# Patient Record
Sex: MALE | Race: WHITE | NOT HISPANIC OR LATINO | Employment: FULL TIME | URBAN - METROPOLITAN AREA
[De-identification: names, ages, dates, MRNs, and addresses within clinical notes are randomized per-mention and may not be internally consistent; named-entity substitution may affect disease eponyms.]

---

## 2017-01-23 DIAGNOSIS — E78.5 HYPERLIPIDEMIA LDL GOAL <160: Primary | ICD-10-CM

## 2017-01-23 NOTE — TELEPHONE ENCOUNTER
atorvastatin (LIPITOR) 20 MG tablet     Last Written Prescription Date: 7-7-2016  Last Fill Quantity: 90, # refills: 1  Last Office Visit with [facility], [facility] or [facility] prescribing provider: 7-8-2016       CHOL      149   9/30/2016  HDL       53   9/30/2016  LDL       71   9/30/2016  TRIG      123   9/30/2016  CHOLHDLRATIO      5.4   10/12/2009

## 2017-01-24 RX ORDER — ATORVASTATIN CALCIUM 20 MG/1
20 TABLET, FILM COATED ORAL DAILY
Qty: 90 TABLET | Refills: 1 | Status: SHIPPED | OUTPATIENT
Start: 2017-01-24 | End: 2017-05-25

## 2017-01-24 NOTE — TELEPHONE ENCOUNTER
Prescription approved per [facility] Protocol.     Fiona Silverman RN   January 24, 2017 4:30 PM  [facility]   [phone]

## 2017-05-25 ENCOUNTER — MYC REFILL (OUTPATIENT)
Dept: FAMILY MEDICINE | Facility: CLINIC | Age: 62
End: 2017-05-25

## 2017-05-25 DIAGNOSIS — E78.5 HYPERLIPIDEMIA LDL GOAL <160: ICD-10-CM

## 2017-05-26 NOTE — TELEPHONE ENCOUNTER
atorvastatin (LIPITOR) 20 MG tablet     Last Written Prescription Date: 01/24/17  Last Fill Quantity: 90, # refills: 1  Last Office Visit with [facility], [facility] or [facility] prescribing provider: 07/08/2016  Next 5 appointments (look out 90 days)     Jul 11, 2017  4:20 PM CDT   MyChart Physical Adult with Escobar Curry PA-C   [facility]    [address] [place] [id number] [phone]                   Lab Results   Component Value Date    CHOL 149 09/30/2016     Lab Results   Component Value Date    HDL 53 09/30/2016     Lab Results   Component Value Date    LDL 71 09/30/2016     Lab Results   Component Value Date    TRIG 123 09/30/2016     Lab Results   Component Value Date    CHOLHDLRATIO 5.4 10/12/2009

## 2017-05-29 ENCOUNTER — OFFICE VISIT (OUTPATIENT)
Dept: URGENT CARE | Facility: URGENT CARE | Age: 62
End: 2017-05-29
Payer: OTHER MISCELLANEOUS

## 2017-05-29 ENCOUNTER — RADIANT APPOINTMENT (OUTPATIENT)
Dept: GENERAL RADIOLOGY | Facility: CLINIC | Age: 62
End: 2017-05-29
Attending: PHYSICIAN ASSISTANT

## 2017-05-29 VITALS
HEART RATE: 72 BPM | TEMPERATURE: 98.2 F | OXYGEN SATURATION: 96 % | DIASTOLIC BLOOD PRESSURE: 83 MMHG | SYSTOLIC BLOOD PRESSURE: 127 MMHG

## 2017-05-29 DIAGNOSIS — S99.921A INJURY OF RIGHT FOOT, INITIAL ENCOUNTER: ICD-10-CM

## 2017-05-29 DIAGNOSIS — M25.571 ACUTE RIGHT ANKLE PAIN: Primary | ICD-10-CM

## 2017-05-29 DIAGNOSIS — S92.901A FRACTURE OF RIGHT FOOT, CLOSED, INITIAL ENCOUNTER: ICD-10-CM

## 2017-05-29 DIAGNOSIS — M25.571 ACUTE RIGHT ANKLE PAIN: ICD-10-CM

## 2017-05-29 PROCEDURE — 73630 X-RAY EXAM OF FOOT: CPT | Mod: RT

## 2017-05-29 PROCEDURE — 99214 OFFICE O/P EST MOD 30 MIN: CPT | Performed by: PHYSICIAN ASSISTANT

## 2017-05-29 PROCEDURE — 73610 X-RAY EXAM OF ANKLE: CPT | Mod: RT

## 2017-05-29 RX ORDER — HYDROCODONE BITARTRATE AND ACETAMINOPHEN 5; 325 MG/1; MG/1
1-2 TABLET ORAL EVERY 6 HOURS PRN
Qty: 20 TABLET | Refills: 0 | Status: SHIPPED | OUTPATIENT
Start: 2017-05-29 | End: 2017-07-11

## 2017-05-29 NOTE — MR AVS SNAPSHOT
After Visit Summary   5/29/2017    Mina Aems    MRN: [id number]           Patient Information     Date Of Birth          1955        Visit Information        Provider Department      5/29/2017 4:05 PM Cain Dotson PA-C [facility]        Today's Diagnoses     Acute right ankle pain    -  1    Injury of right foot, initial encounter        Fracture of right foot, closed, initial encounter           Follow-ups after your visit        Your next 10 appointments already scheduled     Jul 11, 2017  1:15 PM CDT   Return Visit with Heri Cervantes DPM   [facility] [place] ([facility] [place])    [address]  [place] [address] [phone]            Jul 11, 2017  4:20 PM CDT   [facility]Chart Physical Adult with Escobar [facility]    [address] [address] [phone]              Who to contact     If you have questions or need follow up information about today's clinic visit or your schedule please contact [facility] directly at [phone].  Normal or non-critical lab and imaging results will be communicated to you by Anthillhart, letter or phone within 4 business days after the clinic has received the results. If you do not hear from us within 7 days, please contact the clinic through Anthillhart or phone. If you have a critical or abnormal lab result, we will notify you by phone as soon as possible.  Submit refill requests through [employer] or call your pharmacy and they will forward the refill request to us. Please allow 3 business days for your refill to be completed.          Additional Information About Your Visit        MyChart Information     IntroNet gives you secure access to your electronic health record. If you see a primary care provider, you can also send messages to your care  team and make appointments. If you have questions, please call your primary care clinic.  If you do not have a primary care provider, please call [phone] and they will assist you.        Care EveryWhere ID     This is your Care EveryWhere ID. This could be used by other organizations to access your [facility] medical records  [id number]        Your Vitals Were     Pulse Temperature Pulse Oximetry             72 98.2  F (36.8  C) (Oral) 96%          Blood Pressure from Last 3 Encounters:   05/29/17 127/83   07/08/16 130/84   07/04/16 (!) 138/94    Weight from Last 3 Encounters:   05/30/17 234 lb (106.1 kg)   07/08/16 227 lb (103 kg)   07/04/16 226 lb 5 oz (102.7 kg)                 Today's Medication Changes          These changes are accurate as of: 5/29/17 11:59 PM.  If you have any questions, ask your nurse or doctor.               Start taking these medicines.        Dose/Directions    HYDROcodone-acetaminophen 5-325 MG per tablet   Commonly known as:  NORCO   Used for:  Fracture of right foot, closed, initial encounter   Started by:  Cain Dotson PA-C        Dose:  1-2 tablet   Take 1-2 tablets by mouth every 6 hours as needed for moderate to severe pain No working or driving on medication   Quantity:  20 tablet   Refills:  0       * order for DME   Used for:  Fracture of right foot, closed, initial encounter   Started by:  Cain Dotson PA-C        Right short cam walker   Quantity:  1 Device   Refills:  0       * order for DME   Used for:  Fracture of right foot, closed, initial encounter   Started by:  Cain Dotson PA-C        Crutches 1 pair   Quantity:  1 Device   Refills:  0       * Notice:  This list has 2 medication(s) that are the same as other medications prescribed for you. Read the directions carefully, and ask your doctor or other care provider to review them with you.         Where to get your medicines      Some of these will need a paper prescription and others can be bought over  the counter.  Ask your nurse if you have questions.     Bring a paper prescription for each of these medications     HYDROcodone-acetaminophen 5-325 MG per tablet    order for DME    order for DME                Primary Care Provider Office Phone # Fax #    Tomas De La Cruz [phone]       [facility] VALENZUELA [address]  [place] [address]        Thank you!     Thank you for choosing [facility]  for your care. Our goal is always to provide you with excellent care. Hearing back from our patients is one way we can continue to improve our services. Please take a few minutes to complete the written survey that you may receive in the mail after your visit with us. Thank you!             Your Updated Medication List - Protect others around you: Learn how to safely use, store and throw away your medicines at www.disposemymeds.org.          This list is accurate as of: 5/29/17 11:59 PM.  Always use your most recent med list.                   Brand Name Dispense Instructions for use    aspirin 81 MG tablet      Take 81 mg by mouth daily       atorvastatin 20 MG tablet    LIPITOR    90 tablet    Take 1 tablet (20 mg) by mouth daily       HYDROcodone-acetaminophen 5-325 MG per tablet    NORCO    20 tablet    Take 1-2 tablets by mouth every 6 hours as needed for moderate to severe pain No working or driving on medication       MULTI vitamin  MENS Tabs          * order for DME     1 Device    Right short cam walker       * order for DME     1 Device    Crutches 1 pair       * Notice:  This list has 2 medication(s) that are the same as other medications prescribed for you. Read the directions carefully, and ask your doctor or other care provider to review them with you.

## 2017-05-29 NOTE — LETTER
[facility]  [address] [address]  [phone]        May 29, 2017    REPORT OF WORK ABILITY    PATIENT DATA  Employee Name: Mina Ames        : 1955   [id number]  Work related injury: YES  Today's date: May 29, 2017  Date of injury: 2017     PROVIDER EVALUATION: Please fill in as needed.  Please give copy to employee for employer.  1. Diagnosis: Right foot fracture, right ankle sprain  2. Treatment: cam walker, crutches, medication  3. Medication: vicodin  NOTE: When ordering a medication, [place] Rules require Work Comp or WC on prescriptions.  4. Return to work date: TBD    RESTRICTIONS: No work as patient will need to use crutches, he cannot drive nor lift or carry luggage      Medical Examiner: Cain Dotson [facility] PAC      Next appointment: this week with Podiatry/Orthopedics    CC: Employer, Managed Care Plan/Payor, Patient

## 2017-05-29 NOTE — NURSING NOTE
"Chief Complaint   Patient presents with     Ankle/Foot right     Rt ankle injury, pain  x today.       Initial /83 (BP Location: Left arm, Patient Position: Chair, Cuff Size: Adult Regular)  Pulse 72  Temp 98.2  F (36.8  C) (Oral)  SpO2 96% Estimated body mass index is 33.28 kg/(m^2) as calculated from the following:    Height as of 7/8/16: 5' 9.25\" (1.759 m).    Weight as of 7/8/16: 227 lb (103 kg).  Medication Reconciliation: complete    "

## 2017-05-30 ENCOUNTER — OFFICE VISIT (OUTPATIENT)
Dept: PODIATRY | Facility: CLINIC | Age: 62
End: 2017-05-30
Payer: OTHER MISCELLANEOUS

## 2017-05-30 VITALS — WEIGHT: 234 LBS | BODY MASS INDEX: 34.31 KG/M2 | HEART RATE: 100 BPM | OXYGEN SATURATION: 95 %

## 2017-05-30 DIAGNOSIS — S92.354B OPEN NONDISPLACED FRACTURE OF FIFTH METATARSAL BONE OF RIGHT FOOT, INITIAL ENCOUNTER: Primary | ICD-10-CM

## 2017-05-30 DIAGNOSIS — S82.65XA CLOSED NONDISPLACED FRACTURE OF LATERAL MALLEOLUS OF LEFT FIBULA, INITIAL ENCOUNTER: ICD-10-CM

## 2017-05-30 PROCEDURE — 28470 CLTX METATARSAL FX WO MNP EA: CPT | Mod: 59 | Performed by: PODIATRIST

## 2017-05-30 PROCEDURE — 27786 TREATMENT OF ANKLE FRACTURE: CPT | Mod: RT | Performed by: PODIATRIST

## 2017-05-30 NOTE — MR AVS SNAPSHOT
After Visit Summary   5/30/2017    Mina Ames    MRN: [id number]           Patient Information     Date Of Birth          1955        Visit Information        Provider Department      5/30/2017 1:30 PM Heri Cervantes, DPPAT [facility] [place] [facility] Instructions    We wish you continued good healing. If you have any questions or concerns, please do not hesitate to contact us at [phone]      Please remember to call and schedule a follow up appointment if one was recommended at your earliest convenience.   PODIATRY CLINIC HOURS  TELEPHONE NUMBER    Dr. Heri PAULPCARY FAC FAS    Clinics:  [facility]        Ekta Ahuja MA  Medical Assistant  Tuesday 1PM-6PM  [facility]  Wednesday 7AM-2PM  [place]  Thursday 10AM-6PM  [place]  Friday 7AM-345PM  [place]  Specialty schedulers:   [phone] to make an appointment with any Specialty Provider.        Urgent Care locations:    [facility] Monday-Friday 5 pm - 9 pm. Saturday-Sunday 9 am -5pm    Monday-Friday 11 am - 9 pm Saturday 9 am - 5 pm     Monday-Sunday 12 noon-8PM [phone]     If you need a medication refill, please contact us you may need lab work and/or a follow up visit prior to your refill (i.e. Antifungal medications).    No Chainshart (secure e-mail communication and access to your chart) to send a message or to make an appointment.    If MRI needed please call [facility] at [phone]        Weight management plan: Patient was referred to their PCP to discuss a diet and exercise plan.            Follow-ups after your visit        Your next 10 appointments already scheduled     Jul 11, 2017  4:20 PM PAUL Perez Physical Adult with Escobar Curry PA-C   [facility]    [address] [place] [address]  [facility] [address] [phone]              Who to contact     If you have questions or need follow up information about today's clinic visit or your schedule please contact [facility] [place] directly at [phone].  Normal or non-critical lab and imaging results will be communicated to you by MyChart, letter or phone within 4 business days after the clinic has received the results. If you do not hear from us within 7 days, please contact the clinic through MyChart or phone. If you have a critical or abnormal lab result, we will notify you by phone as soon as possible.  Submit refill requests through [employer] or call your pharmacy and they will forward the refill request to us. Please allow 3 business days for your refill to be completed.          Additional Information About Your Visit        MagTaghart Information     [employer] gives you secure access to your electronic health record. If you see a primary care provider, you can also send messages to your care team and make appointments. If you have questions, please call your primary care clinic.  If you do not have a primary care provider, please call [phone] and they will assist you.        Care EveryWhere ID     This is your Care EveryWhere ID. This could be used by other organizations to access your [facility] medical records  [id number]        Your Vitals Were     Pulse Pulse Oximetry BMI (Body Mass Index)             100 95% 34.31 kg/m2          Blood Pressure from Last 3 Encounters:   05/29/17 127/83   07/08/16 130/84   07/04/16 (!) 138/94    Weight from Last 3 Encounters:   05/30/17 106.1 kg (234 lb)   07/08/16 103 kg (227 lb)   07/04/16 102.7 kg (226 lb 5 oz)              Today, you had the following     No orders found for display       Primary Care Provider Office Phone # Fax #    Tomas De La Cruz [phone]       [facility] [place] [address]  [place] [address]        Thank  you!     Thank you for choosing [facility]  for your care. Our goal is always to provide you with excellent care. Hearing back from our patients is one way we can continue to improve our services. Please take a few minutes to complete the written survey that you may receive in the mail after your visit with us. Thank you!             Your Updated Medication List - Protect others around you: Learn how to safely use, store and throw away your medicines at www.disposemymeds.org.          This list is accurate as of: 5/30/17  1:35 PM.  Always use your most recent med list.                   Brand Name Dispense Instructions for use    aspirin 81 MG tablet      Take 81 mg by mouth daily       atorvastatin 20 MG tablet    LIPITOR    90 tablet    Take 1 tablet (20 mg) by mouth daily       HYDROcodone-acetaminophen 5-325 MG per tablet    NORCO    20 tablet    Take 1-2 tablets by mouth every 6 hours as needed for moderate to severe pain No working or driving on medication       MULTI vitamin  MENS Tabs          * order for DME     1 Device    Right short cam walker       * order for DME     1 Device    Crutches 1 pair       * Notice:  This list has 2 medication(s) that are the same as other medications prescribed for you. Read the directions carefully, and ask your doctor or other care provider to review them with you.

## 2017-05-30 NOTE — PROGRESS NOTES
Subjective:    Patient seen as a new patient consult from Cain Dotson and is seen today  for right ankle sprain.  Had inversion sprain 1 days ago at work.   for [employer].  Had pain and edema, seen in clinic, xrays taken, and given air cast and crutches.  Slightly better today.  Has been off foot.  No problems here before sprain.  Never smoked.           ROS:  denies numbness, erythema, shortness of breath       Allergies   Allergen Reactions     Nkda [No Known Drug Allergies]        Current Outpatient Prescriptions   Medication Sig Dispense Refill     order for DME Right short cam walker 1 Device 0     order for DME Crutches 1 pair 1 Device 0     HYDROcodone-acetaminophen (NORCO) 5-325 MG per tablet Take 1-2 tablets by mouth every 6 hours as needed for moderate to severe pain No working or driving on medication 20 tablet 0     atorvastatin (LIPITOR) 20 MG tablet Take 1 tablet (20 mg) by mouth daily 90 tablet 1     aspirin 81 MG tablet Take 81 mg by mouth daily       Multiple Vitamin (MULTI VITAMIN  MENS) TABS          Patient Active Problem List   Diagnosis     Hyperlipidemia     Adult BMI 30+     DJD (degenerative joint disease) of knee     HYPERLIPIDEMIA LDL GOAL <160       Past Medical History:   Diagnosis Date     Adult BMI 30+      DJD (degenerative joint disease) of knee     xray Oct., 2009     Hyperlipidemia      Squamous cell skin cancer 2007    chest       Past Surgical History:   Procedure Laterality Date     C APPENDECTOMY  1971     SURGICAL HISTORY OF -   1995    wrist       Family History   Problem Relation Age of Onset     Hypertension Mother      HEART DISEASE Father      Hypertension Father      Lipids Father      Depression Father      Coronary Artery Disease Father      Bypass surgery - 3        Social History   Substance Use Topics     Smoking status: Never Smoker     Smokeless tobacco: Not on file     Alcohol use No         Objective:    vitals noted.   Patient pleasant to talk with  and in no apparent distress.   CRT < 3 seconds all toes.  No varicosities noted.   +2/4 DP & PT bilateral, sensation to light touch is intact.  Achilles reflex 2/4 bilateral.  Normal arch with weight bearing.  Muscle strength 5/5 in all compartments.  No pain with stressing any tendons.  Normal ROM all forefoot and rearfoot joints.     positive edema and ecchymosis right foot/ankle lateral  negative erythema  negative pain at TMTJs   Pain right fifth styloid process.  negative Achilles or calcaneal tubercle pain  negative medial ankle pain  negative pain AITF ligament  positive pain over ATFL/CFL/lateral ankle  negative pain with stressing or palpation peroneal tendons  negative peroneal subluxation  negative pain over medial   Pain over lateral malleoli    XR FOOT RT G/E 3 VW  5/29/2017 5:17 PM      HISTORY:  Unspecified injury of right foot, initial encounter     COMPARISON: None.         IMPRESSION: There is a fracture through the base of the fifth  metatarsal. No significant displacement.    XR ANKLE RT G/E 3 VW    5/29/2017 5:17 PM       HISTORY: Pain in right ankle and joints of right foot     COMPARISON: None.         IMPRESSION: There is a nondisplaced fracture through the tip of the  lateral malleolus..  Associated soft tissue swelling. Again noted is  the fracture through the base of the fifth metatarsal.      Assessment:  Right styloid process avulsion fracture                          Right lateral malleoli distal fracture      Plan:  X-rays personally reviewed.  Discussed etiology and treatment options with the patient in detail.  Discussed both fracture are stable and need NWB so they may heal uneventfully.  He has air cast and this is comfortable.  Using crutches with no problem.  Discussed knee walker.    Discussed PRICE.   F/U 4 wks for repeat x-ray.  Fracture care x 2.  Thank you for allowing me participate in the care of this patient.        Heri Cervantes DPM, FACFAS

## 2017-05-30 NOTE — PATIENT INSTRUCTIONS
We wish you continued good healing. If you have any questions or concerns, please do not hesitate to contact us at [phone]      Please remember to call and schedule a follow up appointment if one was recommended at your earliest convenience.   PODIATRY CLINIC HOURS  TELEPHONE NUMBER    Dr. Heri Cervantes D.P.M [facility]    Clinics:  [facility]        Ekta Ahuja MA  Medical Assistant  Tuesday 1PM-6PM  [facility]  Wednesday 7AM-2PM  [place]  Thursday 10AM-6PM  [place] Friday 7AM-345PM  [place]  Specialty schedulers:   [phone] to make an appointment with any Specialty Provider.        Urgent Care locations:    [facility] Monday-Friday 5 pm - 9 pm. Saturday-Sunday 9 am -5pm    Monday-Friday 11 am - 9 pm Saturday 9 am - 5 pm     Monday-Sunday 12 noon-8PM [phone]     If you need a medication refill, please contact us you may need lab work and/or a follow up visit prior to your refill (i.e. Antifungal medications).    Web [employer] (secure e-mail communication and access to your chart) to send a message or to make an appointment.    If MRI needed please call [facility] at [phone]        Weight management plan: Patient was referred to their PCP to discuss a diet and exercise plan.

## 2017-05-30 NOTE — LETTER
5/30/2017       RE: Mina Ames  [address]  [place] [address]           Dear Colleague,    Thank you for referring your patient, Mina Ames, to the [facility] [place]. Please see a copy of my visit note below.    Subjective:    Patient seen as a new patient consult from Cain Dotson and is seen today  for right ankle sprain.  Had inversion sprain 1 days ago at work.   for [employer].  Had pain and edema, seen in clinic, xrays taken, and given air cast and crutches.  Slightly better today.  Has been off foot.  No problems here before sprain.  Never smoked.           ROS:  denies numbness, erythema, shortness of breath       Allergies   Allergen Reactions     Nkda [No Known Drug Allergies]        Current Outpatient Prescriptions   Medication Sig Dispense Refill     order for DME Right short cam walker 1 Device 0     order for DME Crutches 1 pair 1 Device 0     HYDROcodone-acetaminophen (NORCO) 5-325 MG per tablet Take 1-2 tablets by mouth every 6 hours as needed for moderate to severe pain No working or driving on medication 20 tablet 0     atorvastatin (LIPITOR) 20 MG tablet Take 1 tablet (20 mg) by mouth daily 90 tablet 1     aspirin 81 MG tablet Take 81 mg by mouth daily       Multiple Vitamin (MULTI VITAMIN  MENS) TABS          Patient Active Problem List   Diagnosis     Hyperlipidemia     Adult BMI 30+     DJD (degenerative joint disease) of knee     HYPERLIPIDEMIA LDL GOAL <160       Past Medical History:   Diagnosis Date     Adult BMI 30+      DJD (degenerative joint disease) of knee     xray Oct., 2009     Hyperlipidemia      Squamous cell skin cancer 2007    chest       Past Surgical History:   Procedure Laterality Date     C APPENDECTOMY  1971     SURGICAL HISTORY OF -   1995    wrist       Family History   Problem Relation Age of Onset     Hypertension Mother      HEART DISEASE Father      Hypertension Father      Lipids Father      Depression Father       Coronary Artery Disease Father      Bypass surgery - 3        Social History   Substance Use Topics     Smoking status: Never Smoker     Smokeless tobacco: Not on file     Alcohol use No         Objective:    vitals noted.   Patient pleasant to talk with and in no apparent distress.   CRT < 3 seconds all toes.  No varicosities noted.   +2/4 DP & PT bilateral, sensation to light touch is intact.  Achilles reflex 2/4 bilateral.  Normal arch with weight bearing.  Muscle strength 5/5 in all compartments.  No pain with stressing any tendons.  Normal ROM all forefoot and rearfoot joints.     positive edema and ecchymosis right foot/ankle lateral  negative erythema  negative pain at TMTJs   Pain right fifth styloid process.  negative Achilles or calcaneal tubercle pain  negative medial ankle pain  negative pain AITF ligament  positive pain over ATFL/CFL/lateral ankle  negative pain with stressing or palpation peroneal tendons  negative peroneal subluxation  negative pain over medial   Pain over lateral malleoli    XR FOOT RT G/E 3 VW  5/29/2017 5:17 PM      HISTORY:  Unspecified injury of right foot, initial encounter     COMPARISON: None.         IMPRESSION: There is a fracture through the base of the fifth  metatarsal. No significant displacement.    XR ANKLE RT G/E 3 VW    5/29/2017 5:17 PM       HISTORY: Pain in right ankle and joints of right foot     COMPARISON: None.         IMPRESSION: There is a nondisplaced fracture through the tip of the  lateral malleolus..  Associated soft tissue swelling. Again noted is  the fracture through the base of the fifth metatarsal.      Assessment:  Right styloid process avulsion fracture                          Right lateral malleoli distal fracture      Plan:  X-rays personally reviewed.  Discussed etiology and treatment options with the patient in detail.  Discussed both fracture are stable and need NWB so they may heal uneventfully.  He has air cast and this is comfortable.   Using crutches with no problem.  Discussed knee walker.    Discussed PRICE.   F/U 4 wks for repeat x-ray.  Fracture care x 2.  Thank you for allowing me participate in the care of this patient.        Heri Cervantes DPM, FACFAS    Again, thank you for allowing me to participate in the care of your patient.        Sincerely,              Heri Cervantes DPM

## 2017-05-30 NOTE — LETTER
[facility] [place]  [address]  [place] [address]  Phone: [phone]  Fax: [phone]    May 30, 2017        Mina Ames  [address] [address]          To whom it may concern:    RE: Mina CLAYTON [place]    Patient was seen and treated today at our clinic.  Patient may return to work  with the following:  SEATED WORK ONLY FOR 8 WEEKS  When the patient returns to work, the following restrictions apply until 7/25/17    Please contact me for questions or concerns.      Sincerely,        Heri Cervantes DPM/AIMEE

## 2017-05-30 NOTE — NURSING NOTE
"Chief Complaint   Patient presents with     Ankle Pain     Right ankle     Work Comp       Initial Pulse 100  Wt 106.1 kg (234 lb)  SpO2 95%  BMI 34.31 kg/m2 Estimated body mass index is 34.31 kg/(m^2) as calculated from the following:    Height as of 7/8/16: 1.759 m (5' 9.25\").    Weight as of this encounter: 106.1 kg (234 lb).  Medication Reconciliation: complete  "

## 2017-06-02 RX ORDER — ATORVASTATIN CALCIUM 20 MG/1
20 TABLET, FILM COATED ORAL DAILY
Qty: 30 TABLET | Refills: 1 | Status: SHIPPED | OUTPATIENT
Start: 2017-06-02 | End: 2017-07-11

## 2017-06-02 NOTE — TELEPHONE ENCOUNTER
Medication is being filled for 1 time refill only due to:  Patient needs to be seen because it has been more than one year since last visit.     Luis Bell RN

## 2017-06-02 NOTE — PROGRESS NOTES
SUBJECTIVE:  Chief Complaint   Patient presents with     Ankle/Foot right     Rt ankle injury, pain  x today.     Mina Ames is a 61 year old male presents with a chief complaint of right ankle and foot injury, tenderness, pain with swelling .  The injury occurred this morning .   The injury happened while at work. How: fall , trauma: twisted.  The patient complained of moderate pain  and has had decreased ROM.  Pain exacerbated by walking and weight-bearing.  Relieved by rest.  He treated it initially with no therapy. This is the first time this type of injury has occurred to this patient.     Past Medical History:   Diagnosis Date     Adult BMI 30+      DJD (degenerative joint disease) of knee     xray Oct., 2009     Hyperlipidemia      Squamous cell skin cancer 2007    chest     Allergies   Allergen Reactions     Nkda [No Known Drug Allergies]      Social History   Substance Use Topics     Smoking status: Never Smoker     Smokeless tobacco: Not on file     Alcohol use No       ROS:  CONSTITUTIONAL:NEGATIVE for fever, chills, change in weight  INTEGUMENTARY/SKIN: POSITIVE for swelling  MUSCULOSKELETAL: POSITIVE  for right ankle and foot injury  VASC: NEGATIVE for cold extremities  EXT: Positive for ankle and foot pain  NEURO: NEGATIVE for weakness, dizziness or paresthesias    EXAM:   /83 (BP Location: Left arm, Patient Position: Chair, Cuff Size: Adult Regular)  Pulse 72  Temp 98.2  F (36.8  C) (Oral)  SpO2 96%  Gen: healthy,alert,no distress  Extremity: foot and ankle has swelling and point tenderness lateral ankle and foot.   There is not compromise to the distal circulation.  Pulses are +2 and CRT is brisk  GENERAL APPEARANCE: healthy, alert and no distress  EXTREMITIES: peripheral pulses normal  MS:  Positive for right ankle tenderness, localized swelling and pain  SKIN: Positive for swelling of lateral ankle and foot  NEURO: Normal strength and tone, sensory exam grossly normal, mentation  "intact and speech normal    X-RAY Positive for foot fracture and possible chip fracture off lateral malleolus Xray read by Cain Dotson at time of visit    ASSESSMENT/PLAN\"      ICD-10-CM    1. Acute right ankle pain M25.571 XR Ankle Right G/E 3 Views   2. Injury of right foot, initial encounter S99.921A XR Foot Right G/E 3 Views   3. Fracture of right foot, closed, initial encounter S92.901A order for DME     order for DME     HYDROcodone-acetaminophen (NORCO) 5-325 MG per tablet     RICE treatment: Rest, Ice, compression, elevation   Wear cam walker  Use crutches  Follow up with orthopedics   "

## 2017-07-11 ENCOUNTER — OFFICE VISIT (OUTPATIENT)
Dept: PODIATRY | Facility: CLINIC | Age: 62
End: 2017-07-11
Payer: OTHER MISCELLANEOUS

## 2017-07-11 ENCOUNTER — RADIANT APPOINTMENT (OUTPATIENT)
Dept: GENERAL RADIOLOGY | Facility: CLINIC | Age: 62
End: 2017-07-11
Attending: PODIATRIST
Payer: COMMERCIAL

## 2017-07-11 ENCOUNTER — OFFICE VISIT (OUTPATIENT)
Dept: FAMILY MEDICINE | Facility: CLINIC | Age: 62
End: 2017-07-11
Payer: COMMERCIAL

## 2017-07-11 VITALS
WEIGHT: 235.13 LBS | TEMPERATURE: 98.3 F | HEART RATE: 80 BPM | SYSTOLIC BLOOD PRESSURE: 126 MMHG | BODY MASS INDEX: 33.66 KG/M2 | DIASTOLIC BLOOD PRESSURE: 78 MMHG | HEIGHT: 70 IN

## 2017-07-11 VITALS — WEIGHT: 235 LBS | HEART RATE: 90 BPM | OXYGEN SATURATION: 98 % | BODY MASS INDEX: 34.45 KG/M2

## 2017-07-11 DIAGNOSIS — S82.65XA CLOSED NONDISPLACED FRACTURE OF LATERAL MALLEOLUS OF LEFT FIBULA, INITIAL ENCOUNTER: Primary | ICD-10-CM

## 2017-07-11 DIAGNOSIS — S82.65XA CLOSED NONDISPLACED FRACTURE OF LATERAL MALLEOLUS OF LEFT FIBULA, INITIAL ENCOUNTER: ICD-10-CM

## 2017-07-11 DIAGNOSIS — M17.11 OSTEOARTHRITIS OF RIGHT KNEE, UNSPECIFIED OSTEOARTHRITIS TYPE: ICD-10-CM

## 2017-07-11 DIAGNOSIS — S92.355D CLOSED NONDISPLACED FRACTURE OF FIFTH METATARSAL BONE OF LEFT FOOT WITH ROUTINE HEALING, SUBSEQUENT ENCOUNTER: ICD-10-CM

## 2017-07-11 DIAGNOSIS — Z00.00 ROUTINE GENERAL MEDICAL EXAMINATION AT A HEALTH CARE FACILITY: Primary | ICD-10-CM

## 2017-07-11 DIAGNOSIS — E78.5 HYPERLIPIDEMIA LDL GOAL <160: ICD-10-CM

## 2017-07-11 PROCEDURE — 73610 X-RAY EXAM OF ANKLE: CPT | Mod: LT

## 2017-07-11 PROCEDURE — 99024 POSTOP FOLLOW-UP VISIT: CPT | Performed by: PODIATRIST

## 2017-07-11 PROCEDURE — 73630 X-RAY EXAM OF FOOT: CPT | Mod: LT

## 2017-07-11 PROCEDURE — 82947 ASSAY GLUCOSE BLOOD QUANT: CPT | Performed by: PHYSICIAN ASSISTANT

## 2017-07-11 PROCEDURE — 99396 PREV VISIT EST AGE 40-64: CPT | Performed by: PHYSICIAN ASSISTANT

## 2017-07-11 PROCEDURE — 36415 COLL VENOUS BLD VENIPUNCTURE: CPT | Performed by: PHYSICIAN ASSISTANT

## 2017-07-11 PROCEDURE — 80061 LIPID PANEL: CPT | Performed by: PHYSICIAN ASSISTANT

## 2017-07-11 RX ORDER — NICOTINE POLACRILEX 4 MG/1
20 GUM, CHEWING ORAL DAILY
COMMUNITY

## 2017-07-11 RX ORDER — ATORVASTATIN CALCIUM 20 MG/1
20 TABLET, FILM COATED ORAL DAILY
Qty: 90 TABLET | Refills: 3 | Status: SHIPPED | OUTPATIENT
Start: 2017-07-11 | End: 2018-06-08

## 2017-07-11 NOTE — LETTER
7/11/2017         RE: Mina Ames  [address]  [facility] [address]        Dear Colleague,    Thank you for referring your patient, Mina Ames, to the [facility]. Please see a copy of my visit note below.    Subjective:    5/30/17  Patient seen as a new patient consult from Cain Dotson and is seen today  for right ankle sprain.  Had inversion sprain 1 days ago at work.   for [employer].  Had pain and edema, seen in clinic, xrays taken, and given air cast and crutches.  Slightly better today.  Has been off foot.  No problems here before sprain.  Never smoked.     7/11/17  Patient feeling much better.  Walking now in ankalizer and minimal pain.  Was never on crutches and has been walking on this.             ROS:  denies numbness, erythema, shortness of breath       Allergies   Allergen Reactions     Nkda [No Known Drug Allergies]        Current Outpatient Prescriptions   Medication Sig Dispense Refill     atorvastatin (LIPITOR) 20 MG tablet Take 1 tablet (20 mg) by mouth daily Need office visit for refills 30 tablet 1     order for DME Right short cam walker 1 Device 0     aspirin 81 MG tablet Take 81 mg by mouth daily       Multiple Vitamin (MULTI VITAMIN  MENS) TABS        order for DME Crutches 1 pair (Patient not taking: Reported on 7/11/2017) 1 Device 0       Patient Active Problem List   Diagnosis     Hyperlipidemia     Adult BMI 30+     DJD (degenerative joint disease) of knee     HYPERLIPIDEMIA LDL GOAL <160       Past Medical History:   Diagnosis Date     Adult BMI 30+      DJD (degenerative joint disease) of knee     xray Oct., 2009     Hyperlipidemia      Squamous cell skin cancer 2007    chest       Past Surgical History:   Procedure Laterality Date     C APPENDECTOMY  1971     SURGICAL HISTORY OF -   1995    wrist       Family History   Problem Relation Age of Onset     Hypertension Mother      HEART DISEASE Father      Hypertension Father       Lipids Father      Depression Father      Coronary Artery Disease Father      Bypass surgery - 3        Social History   Substance Use Topics     Smoking status: Never Smoker     Smokeless tobacco: Not on file     Alcohol use No         Objective:    vitals noted.   Patient pleasant to talk with and in no apparent distress.   CRT < 3 seconds all toes.  No varicosities noted.   +2/4 DP & PT bilateral, sensation to light touch is intact.  Achilles reflex 2/4 bilateral.  Normal arch with weight bearing.  Muscle strength 5/5 in all compartments.  No pain with stressing any tendons.  Normal ROM all forefoot and rearfoot joints.     Minimal edema and no ecchymosis right foot/ankle lateral  negative erythema  negative pain at TMTJs   Almost no pain right fifth styloid process.  negative Achilles or calcaneal tubercle pain  negative medial ankle pain  negative pain AITF ligament  Slight pain over ATFL/CFL/lateral ankle  negative pain with stressing or palpation peroneal tendons  negative peroneal subluxation  negative pain over medial   Some pain over lateral malleoli    THREE VIEWS RIGHT FOOT AND THREE VIEWS RIGHT ANKLE July 11, 2017 2:14  PM     HISTORY: Fracture follow-up.     COMPARISON: 5/29/2017.     FINDINGS: There has been no change in position or alignment of the  previously seen nondisplaced fracture through the base of the fifth  metatarsal. There appears to have been some resorption along the  fracture line consistent with early stages of healing. No significant  bone bridging is yet occurred. Also unchanged in position is the  previously seen nondisplaced fracture of the inferior tip of the  lateral malleolus. This fracture line is also somewhat more distinct  consistent with resorptive change.     No other fracture or osseous lesion is seen. No joint space pathology  is demonstrated and no soft tissue pathology is seen.         IMPRESSION: Bone resorption adjacent to the previously seen fractures  of the  base of the fifth metatarsal and of the inferior lateral  malleolus. This is consistent with early stages of healing.       Assessment:  Right styloid process avulsion fracture                          Right lateral malleoli distal fracture      Plan:  X-rays taken today.  Discussed this is healing despite not being NWB.  He will continue air cast and this is comfortable.  Using crutches with no problem.  Discussed knee walker.    Discussed PRICE.   F/U 3 wks for repeat x-ray.  Fracture care x 2.        Heri Cervantes DPM, FACFAS    Again, thank you for allowing me to participate in the care of your patient.        Sincerely,        Heri Cervantes DPM

## 2017-07-11 NOTE — LETTER
[facility] [place]  [address]  [place] [address]  Phone: [phone]  Fax: [phone]    July 11, 2017        Mina Ames  [address] [address]          To whom it may concern:    RE: Mina CLAYTON OrrAmes    Patient was seen and treated today at our clinic and missed work.  Patient may return to work in 3 weeks July 11- Aug 1st.  Please contact me for questions or concerns.      Sincerely,        Heri Cervantes DPM/ofe

## 2017-07-11 NOTE — PATIENT INSTRUCTIONS
We wish you continued good healing. If you have any questions or concerns, please do not hesitate to contact us at [phone]      Please remember to call and schedule a follow up appointment if one was recommended at your earliest convenience.   PODIATRY CLINIC HOURS  TELEPHONE NUMBER    Dr. Heri Cervantes D.P.M [facility]    Clinics:  [facility]        Ekta Ahuja MA  Medical Assistant  Tuesday 1PM-6PM  [facility]  Wednesday 7AM-2PM  [place]  Thursday 10AM-6PM  [place] Friday 7AM-345PM  [place]  Specialty schedulers:   [phone] to make an appointment with any Specialty Provider.        Urgent Care locations:    [facility] Monday-Friday 5 pm - 9 pm. Saturday-Sunday 9 am -5pm    Monday-Friday 11 am - 9 pm Saturday 9 am - 5 pm     Monday-Sunday 12 noon-8PM [phone]     If you need a medication refill, please contact us you may need lab work and/or a follow up visit prior to your refill (i.e. Antifungal medications).    [employer]utor [employer] (secure e-mail communication and access to your chart) to send a message or to make an appointment.    If MRI needed please call [place] [facility] at [phone]        Weight management plan: Patient was referred to their PCP to discuss a diet and exercise plan.

## 2017-07-11 NOTE — MR AVS SNAPSHOT
After Visit Summary   7/11/2017    Mina Ames    MRN: [id number]           Patient Information     Date Of Birth          1955        Visit Information        Provider Department      7/11/2017 1:15 PM Heri Cervantes, KEHINDE [facility] [place]        Today's Diagnoses     Closed nondisplaced fracture of lateral malleolus of left fibula, initial encounter    -  1    Closed nondisplaced fracture of fifth metatarsal bone of left foot with routine healing, subsequent encounter          Care Instructions    We wish you continued good healing. If you have any questions or concerns, please do not hesitate to contact us at [phone]      Please remember to call and schedule a follow up appointment if one was recommended at your earliest convenience.   PODIATRY CLINIC HOURS  TELEPHONE NUMBER    Dr. Heri PAULPCARY [facility]    Clinics:  [facility]  [facility]        Ekta Ahuja MA  Medical Assistant  Tuesday 1PM-6PM  [place]  Wednesday 7AM-2PM  [place]  Thursday 10AM-6PM  [place]  Friday 7AM-345PM  [place]  Specialty schedulers:   [phone] to make an appointment with any Specialty Provider.        Urgent Care locations:    [facility] Monday-Friday 5 pm - 9 pm. Saturday-Sunday 9 am -5pm    Monday-Friday 11 am - 9 pm Saturday 9 am - 5 pm     Monday-Sunday 12 noon-8PM [phone]     If you need a medication refill, please contact us you may need lab work and/or a follow up visit prior to your refill (i.e. Antifungal medications).    Microdermishart (secure e-mail communication and access to your chart) to send a message or to make an appointment.    If MRI needed please call [place] [facility] at [phone]        Weight management plan: Patient was referred to their PCP to discuss a diet and exercise plan.            Follow-ups after  your visit        Your next 10 appointments already scheduled     Aug 01, 2017  1:15 PM CDT   Return Visit with Heri Cervantes DPM   [facility]    [address]  [place] [address] [phone]              Who to contact     If you have questions or need follow up information about today's clinic visit or your schedule please contact [facility] directly at [phone].  Normal or non-critical lab and imaging results will be communicated to you by Jaunthart, letter or phone within 4 business days after the clinic has received the results. If you do not hear from us within 7 days, please contact the clinic through Explore Engaget or phone. If you have a critical or abnormal lab result, we will notify you by phone as soon as possible.  Submit refill requests through Conatix or call your pharmacy and they will forward the refill request to us. Please allow 3 business days for your refill to be completed.          Additional Information About Your Visit        JauntharIm[employer] Health Information     Conatix gives you secure access to your electronic health record. If you see a primary care provider, you can also send messages to your care team and make appointments. If you have questions, please call your primary care clinic.  If you do not have a primary care provider, please call [phone] and they will assist you.        Care EveryWhere ID     This is your Care EveryWhere ID. This could be used by other organizations to access your [place] medical records  [id number]        Your Vitals Were     Pulse Pulse Oximetry BMI (Body Mass Index)             90 98% 34.45 kg/m2          Blood Pressure from Last 3 Encounters:   07/11/17 126/78   05/29/17 127/83   07/08/16 130/84    Weight from Last 3 Encounters:   07/11/17 106.7 kg (235 lb 2 oz)   07/11/17 106.6 kg (235 lb)   05/30/17 106.1 kg (234 lb)                 Today's Medication Changes          These  changes are accurate as of: 7/11/17  6:14 PM.  If you have any questions, ask your nurse or doctor.               These medicines have changed or have updated prescriptions.        Dose/Directions    atorvastatin 20 MG tablet   Commonly known as:  LIPITOR   This may have changed:  additional instructions   Used for:  Hyperlipidemia LDL goal <160   Changed by:  Escobar Curry PA-C        Dose:  20 mg   Take 1 tablet (20 mg) by mouth daily   Quantity:  90 tablet   Refills:  3            Where to get your medicines      These medications were sent to [facility] - [address]  [address] [place] [address]     Phone:  [phone]     atorvastatin 20 MG tablet                Primary Care Provider Office Phone # Fax #    Tomas De La Cruz [phone]       [facility] [id number] [place] [address]        Equal Access to Services     [facility]: Hadii aad ku hadasho Soomaali, waaxda luqadaha, qaybta kaalmada adeegyada, waxay idiin hayaan adeeg jjarabraeden mancuso . So [facility] [phone].    ATENCIÓN: Si habla español, tiene a solano disposición servicios gratuitos de asistencia lingüística. [facility] [phone].    We comply with applicable federal civil rights laws and [place] laws. We do not discriminate on the basis of race, color, national origin, age, disability sex, sexual orientation or gender identity.            Thank you!     Thank you for choosing [facility]  for your care. Our goal is always to provide you with excellent care. Hearing back from our patients is one way we can continue to improve our services. Please take a few minutes to complete the written survey that you may receive in the mail after your visit with us. Thank you!             Your Updated Medication List - Protect others around you: Learn how to safely use, store and throw away your medicines at  www.disposemymeds.org.          This list is accurate as of: 7/11/17  6:14 PM.  Always use your most recent med list.                   Brand Name Dispense Instructions for use Diagnosis    aspirin 81 MG tablet      Take 81 mg by mouth daily        atorvastatin 20 MG tablet    LIPITOR    90 tablet    Take 1 tablet (20 mg) by mouth daily    Hyperlipidemia LDL goal <160       MULTI vitamin  MENS Tabs           omeprazole 20 MG tablet      Take 20 mg by mouth daily        * order for DME     1 Device    Right short cam walker    Fracture of right foot, closed, initial encounter       * order for DME     1 Device    Crutches 1 pair    Fracture of right foot, closed, initial encounter       * Notice:  This list has 2 medication(s) that are the same as other medications prescribed for you. Read the directions carefully, and ask your doctor or other care provider to review them with you.

## 2017-07-11 NOTE — MR AVS SNAPSHOT
After Visit Summary   7/11/2017    Mina Ames    MRN: [id number]           Patient Information     Date Of Birth          1955        Visit Information        Provider Department      7/11/2017 4:20 PM Escobar Curry PA-C [facility]        Today's Diagnoses     Routine general medical examination at a health care facility    -  1    Hyperlipidemia LDL goal <160        Osteoarthritis of right knee, unspecified osteoarthritis type          Care Instructions      1. Zantac - 150 , 1 pill twice daily - can try instead of omeprazole  2. Can do every other day of omeprazole for a trial as well     Preventive Health Recommendations  Male Ages 50 - 64    Yearly exam:             See your health care provider every year in order to  o   Review health changes.   o   Discuss preventive care.    o   Review your medicines if your doctor has prescribed any.     Have a cholesterol test every 5 years, or more frequently if you are at risk for high cholesterol/heart disease.     Have a diabetes test (fasting glucose) every three years. If you are at risk for diabetes, you should have this test more often.     Have a colonoscopy at age 50, or have a yearly FIT test (stool test). These exams will check for colon cancer.      Talk with your health care provider about whether or not a prostate cancer screening test (PSA) is right for you.    You should be tested each year for STDs (sexually transmitted diseases), if you re at risk.     Shots: Get a flu shot each year. Get a tetanus shot every 10 years.     Nutrition:    Eat at least 5 servings of fruits and vegetables daily.     Eat whole-grain bread, whole-wheat pasta and brown rice instead of white grains and rice.     Talk to your provider about Calcium and Vitamin D.     Lifestyle    Exercise for at least 150 minutes a week (30 minutes a day, 5 days a week). This will help you control your weight and prevent disease.     Limit alcohol  "to one drink per day.     No smoking.     Wear sunscreen to prevent skin cancer.     See your dentist every six months for an exam and cleaning.     See your eye doctor every 1 to 2 years.            Follow-ups after your visit        Your next 10 appointments already scheduled     Aug 01, 2017  1:15 PM CDT   Return Visit with Heri Cervantes DPM   [facility]    [address] [place] [address]  [place] [address] [phone]              Who to contact     If you have questions or need follow up information about today's clinic visit or your schedule please contact [facility] directly at [phone].  Normal or non-critical lab and imaging results will be communicated to you by Curetishart, letter or phone within 4 business days after the clinic has received the results. If you do not hear from us within 7 days, please contact the clinic through Curetishart or phone. If you have a critical or abnormal lab result, we will notify you by phone as soon as possible.  Submit refill requests through Nubity or call your pharmacy and they will forward the refill request to us. Please allow 3 business days for your refill to be completed.          Additional Information About Your Visit        Curetishar[employer] Information     Nubity gives you secure access to your electronic health record. If you see a primary care provider, you can also send messages to your care team and make appointments. If you have questions, please call your primary care clinic.  If you do not have a primary care provider, please call [phone] and they will assist you.        Care EveryWhere ID     This is your Care EveryWhere ID. This could be used by other organizations to access your [place] medical records  [id number]        Your Vitals Were     Pulse Temperature Height BMI (Body Mass Index)          80 98.3  F (36.8  C) (Oral) 5' 9.5\" (1.765 m) 34.22 kg/m2         Blood Pressure from Last 3 " Encounters:   07/11/17 126/78   05/29/17 127/83   07/08/16 130/84    Weight from Last 3 Encounters:   07/11/17 235 lb 2 oz (106.7 kg)   07/11/17 235 lb (106.6 kg)   05/30/17 234 lb (106.1 kg)              We Performed the Following     Glucose     Lipid panel reflex to direct LDL          Today's Medication Changes          These changes are accurate as of: 7/11/17  4:53 PM.  If you have any questions, ask your nurse or doctor.               These medicines have changed or have updated prescriptions.        Dose/Directions    atorvastatin 20 MG tablet   Commonly known as:  LIPITOR   This may have changed:  additional instructions   Used for:  Hyperlipidemia LDL goal <160   Changed by:  Escobar Curry PA-C        Dose:  20 mg   Take 1 tablet (20 mg) by mouth daily   Quantity:  90 tablet   Refills:  3            Where to get your medicines      These medications were sent to [facility] [address] [address]     Phone:  [phone]     atorvastatin 20 MG tablet                Primary Care Provider Office Phone # Fax #    Tomas De La Cruz [phone]       [facility] [id number] [place] [address]        Equal Access to Services     [facility]: Hadii aad ku hadasho Soomaali, waaxda luqadaha, qaybta kaalmada adeegyada, waxay idiin hayrichardn kev aceves. So [facility] [phone].    ATENCIÓN: Si habla español, tiene a solano disposición servicios gratuitos de asistencia lingüística. Donna al [phone].    We comply with applicable federal civil rights laws and [place] laws. We do not discriminate on the basis of race, color, national origin, age, disability sex, sexual orientation or gender identity.            Thank you!     Thank you for choosing [facility]  for your care. Our goal is always to provide you with excellent care. Hearing back from our patients is one  way we can continue to improve our services. Please take a few minutes to complete the written survey that you may receive in the mail after your visit with us. Thank you!             Your Updated Medication List - Protect others around you: Learn how to safely use, store and throw away your medicines at www.disposemymeds.org.          This list is accurate as of: 7/11/17  4:53 PM.  Always use your most recent med list.                   Brand Name Dispense Instructions for use Diagnosis    aspirin 81 MG tablet      Take 81 mg by mouth daily        atorvastatin 20 MG tablet    LIPITOR    90 tablet    Take 1 tablet (20 mg) by mouth daily    Hyperlipidemia LDL goal <160       MULTI vitamin  MENS Tabs           omeprazole 20 MG tablet      Take 20 mg by mouth daily        * order for DME     1 Device    Right short cam walker    Fracture of right foot, closed, initial encounter       * order for DME     1 Device    Crutches 1 pair    Fracture of right foot, closed, initial encounter       * Notice:  This list has 2 medication(s) that are the same as other medications prescribed for you. Read the directions carefully, and ask your doctor or other care provider to review them with you.

## 2017-07-11 NOTE — NURSING NOTE
"Chief Complaint   Patient presents with     Foot Pain     right foot       Initial Pulse 90  Wt 106.6 kg (235 lb)  SpO2 98%  BMI 34.45 kg/m2 Estimated body mass index is 34.45 kg/(m^2) as calculated from the following:    Height as of 7/8/16: 1.759 m (5' 9.25\").    Weight as of this encounter: 106.6 kg (235 lb).  Medication Reconciliation: complete  "

## 2017-07-11 NOTE — PROGRESS NOTES
SUBJECTIVE:   CC: Mina Ames is an 61 year old male who presents for preventative health visit.     Physical   Annual:     Getting at least 3 servings of Calcium per day::  Yes    Bi-annual eye exam::  Yes    Dental care twice a year::  Yes    Sleep apnea or symptoms of sleep apnea::  None    Diet::  Regular (no restrictions)    Frequency of exercise::  4-5 days/week    Duration of exercise::  30-45 minutes    Taking medications regularly::  Yes    Medication side effects::  None    Additional concerns today::  YES    Today's PHQ-2 Score:   PHQ-2 ( 1999 [employer]) 7/8/2017   Q1: Little interest or pleasure in doing things 0   Q2: Feeling down, depressed or hopeless 0   PHQ-2 Score 0   Q1: Little interest or pleasure in doing things Not at all   Q2: Feeling down, depressed or hopeless Not at all   PHQ-2 Score 0       Abuse: Current or Past(Physical, Sexual or Emotional)- No  Do you feel safe in your environment - Yes    Social History   Substance Use Topics     Smoking status: Never Smoker     Smokeless tobacco: Not on file     Alcohol use No     The patient does not drink >3 drinks per day nor >7 drinks per week.    Last PSA:   PSA   Date Value Ref Range Status   06/07/2016 0.77 0 - 4 ug/L Final       Reviewed orders with patient. Reviewed health maintenance and updated orders accordingly - Yes  Labs reviewed in EPIC    Reviewed and updated as needed this visit by clinical staff  Tobacco  Allergies  Meds  Med Hx  Surg Hx  Fam Hx  Soc Hx        Reviewed and updated as needed this visit by Provider  Allergies  Meds            ROS:  C: NEGATIVE for fever, chills, change in weight  I: NEGATIVE for worrisome rashes, moles or lesions  E: NEGATIVE for vision changes or irritation  ENT: NEGATIVE for ear, mouth and throat problems  R: NEGATIVE for significant cough or SOB  CV: NEGATIVE for chest pain, palpitations or peripheral edema  GI: NEGATIVE for nausea, abdominal pain, heartburn, or change in bowel habits    "male: negative for dysuria, hematuria, decreased urinary stream, erectile dysfunction, urethral discharge  M: NEGATIVE for significant arthralgias or myalgia  N: NEGATIVE for weakness, dizziness or paresthesias  P: NEGATIVE for changes in mood or affect    OBJECTIVE:   /78 (BP Location: Right arm, Cuff Size: Adult Large)  Pulse 80  Temp 98.3  F (36.8  C) (Oral)  Ht 5' 9.5\" (1.765 m)  Wt 235 lb 2 oz (106.7 kg)  BMI 34.22 kg/m2    EXAM:  GENERAL: healthy, alert and no distress  EYES: Eyes grossly normal to inspection, PERRL and conjunctivae and sclerae normal  HENT: ear canals and TM's normal, nose and mouth without ulcers or lesions  NECK: no adenopathy, no asymmetry, masses, or scars and thyroid normal to palpation  RESP: lungs clear to auscultation - no rales, rhonchi or wheezes  CV: regular rate and rhythm, normal S1 S2, no S3 or S4, no murmur, click or rub, no peripheral edema and peripheral pulses strong  ABDOMEN: soft, nontender, no hepatosplenomegaly, no masses and bowel sounds normal  MS: no gross musculoskeletal defects noted, no edema  SKIN: no suspicious lesions or rashes  NEURO: Normal strength and tone, mentation intact and speech normal  PSYCH: mentation appears normal, affect normal/bright  LYMPH: no cervical, supraclavicular, axillary, or inguinal adenopathy    ASSESSMENT/PLAN:   (Z00.00) Routine general medical examination at a health care facility  (primary encounter diagnosis)  Comment: Well person   Plan: Glucose        Diet, exercise, wellness and other preventive recommendations related to health maintenance were discussed.  Follow up as needed for acute issues.  Physical exam in 1 year.     (E78.5) Hyperlipidemia LDL goal <160  Comment:   Plan: atorvastatin (LIPITOR) 20 MG tablet, Lipid         panel reflex to direct LDL        Refills - stable     (M17.11) Osteoarthritis of right knee, unspecified osteoarthritis type  Comment:   Plan: Not worrisome to him     COUNSELING:   Reviewed " "preventive health counseling, as reflected in patient instructions     reports that he has never smoked. He does not have any smokeless tobacco history on file.    Estimated body mass index is 34.22 kg/(m^2) as calculated from the following:    Height as of this encounter: 5' 9.5\" (1.765 m).    Weight as of this encounter: 235 lb 2 oz (106.7 kg).       Counseling Resources:  ATP IV Guidelines  Pooled Cohorts Equation Calculator  FRAX Risk Assessment  ICSI Preventive Guidelines  Dietary Guidelines for Americans, 2010  USDA's MyPlate  ASA Prophylaxis  Lung CA Screening    BJORN KNUTSON PA-C  [facility]  "

## 2017-07-11 NOTE — PATIENT INSTRUCTIONS
1. Zantac - 150 , 1 pill twice daily - can try instead of omeprazole  2. Can do every other day of omeprazole for a trial as well     Preventive Health Recommendations  Male Ages 50 - 64    Yearly exam:             See your health care provider every year in order to  o   Review health changes.   o   Discuss preventive care.    o   Review your medicines if your doctor has prescribed any.     Have a cholesterol test every 5 years, or more frequently if you are at risk for high cholesterol/heart disease.     Have a diabetes test (fasting glucose) every three years. If you are at risk for diabetes, you should have this test more often.     Have a colonoscopy at age 50, or have a yearly FIT test (stool test). These exams will check for colon cancer.      Talk with your health care provider about whether or not a prostate cancer screening test (PSA) is right for you.    You should be tested each year for STDs (sexually transmitted diseases), if you re at risk.     Shots: Get a flu shot each year. Get a tetanus shot every 10 years.     Nutrition:    Eat at least 5 servings of fruits and vegetables daily.     Eat whole-grain bread, whole-wheat pasta and brown rice instead of white grains and rice.     Talk to your provider about Calcium and Vitamin D.     Lifestyle    Exercise for at least 150 minutes a week (30 minutes a day, 5 days a week). This will help you control your weight and prevent disease.     Limit alcohol to one drink per day.     No smoking.     Wear sunscreen to prevent skin cancer.     See your dentist every six months for an exam and cleaning.     See your eye doctor every 1 to 2 years.

## 2017-07-11 NOTE — PROGRESS NOTES
Subjective:    5/30/17  Patient seen as a new patient consult from Cain Dotson and is seen today  for right ankle sprain.  Had inversion sprain 1 days ago at work.   for [employer].  Had pain and edema, seen in clinic, xrays taken, and given air cast and crutches.  Slightly better today.  Has been off foot.  No problems here before sprain.  Never smoked.     7/11/17  Patient feeling much better.  Walking now in ankalizer and minimal pain.  Was never on crutches and has been walking on this.             ROS:  denies numbness, erythema, shortness of breath       Allergies   Allergen Reactions     Nkda [No Known Drug Allergies]        Current Outpatient Prescriptions   Medication Sig Dispense Refill     atorvastatin (LIPITOR) 20 MG tablet Take 1 tablet (20 mg) by mouth daily Need office visit for refills 30 tablet 1     order for DME Right short cam walker 1 Device 0     aspirin 81 MG tablet Take 81 mg by mouth daily       Multiple Vitamin (MULTI VITAMIN  MENS) TABS        order for DME Crutches 1 pair (Patient not taking: Reported on 7/11/2017) 1 Device 0       Patient Active Problem List   Diagnosis     Hyperlipidemia     Adult BMI 30+     DJD (degenerative joint disease) of knee     HYPERLIPIDEMIA LDL GOAL <160       Past Medical History:   Diagnosis Date     Adult BMI 30+      DJD (degenerative joint disease) of knee     xray Oct., 2009     Hyperlipidemia      Squamous cell skin cancer 2007    chest       Past Surgical History:   Procedure Laterality Date     C APPENDECTOMY  1971     SURGICAL HISTORY OF -   1995    wrist       Family History   Problem Relation Age of Onset     Hypertension Mother      HEART DISEASE Father      Hypertension Father      Lipids Father      Depression Father      Coronary Artery Disease Father      Bypass surgery - 3        Social History   Substance Use Topics     Smoking status: Never Smoker     Smokeless tobacco: Not on file     Alcohol use No         Objective:    vitals  noted.   Patient pleasant to talk with and in no apparent distress.   CRT < 3 seconds all toes.  No varicosities noted.   +2/4 DP & PT bilateral, sensation to light touch is intact.  Achilles reflex 2/4 bilateral.  Normal arch with weight bearing.  Muscle strength 5/5 in all compartments.  No pain with stressing any tendons.  Normal ROM all forefoot and rearfoot joints.     Minimal edema and no ecchymosis right foot/ankle lateral  negative erythema  negative pain at TMTJs   Almost no pain right fifth styloid process.  negative Achilles or calcaneal tubercle pain  negative medial ankle pain  negative pain AITF ligament  Slight pain over ATFL/CFL/lateral ankle  negative pain with stressing or palpation peroneal tendons  negative peroneal subluxation  negative pain over medial   Some pain over lateral malleoli    THREE VIEWS RIGHT FOOT AND THREE VIEWS RIGHT ANKLE July 11, 2017 2:14  PM     HISTORY: Fracture follow-up.     COMPARISON: 5/29/2017.     FINDINGS: There has been no change in position or alignment of the  previously seen nondisplaced fracture through the base of the fifth  metatarsal. There appears to have been some resorption along the  fracture line consistent with early stages of healing. No significant  bone bridging is yet occurred. Also unchanged in position is the  previously seen nondisplaced fracture of the inferior tip of the  lateral malleolus. This fracture line is also somewhat more distinct  consistent with resorptive change.     No other fracture or osseous lesion is seen. No joint space pathology  is demonstrated and no soft tissue pathology is seen.         IMPRESSION: Bone resorption adjacent to the previously seen fractures  of the base of the fifth metatarsal and of the inferior lateral  malleolus. This is consistent with early stages of healing.       Assessment:  Right styloid process avulsion fracture                          Right lateral malleoli distal fracture      Plan:  X-rays  taken today.  Discussed this is healing despite not being NWB.  He will continue air cast and this is comfortable.  Using crutches with no problem.  Discussed knee walker.    Discussed PRICE.   F/U 3 wks for repeat x-ray.  Fracture care x 2.        Heri SWEETM, FACFAS

## 2017-07-11 NOTE — NURSING NOTE
"Chief Complaint   Patient presents with     Physical       Initial There were no vitals taken for this visit. Estimated body mass index is 34.45 kg/(m^2) as calculated from the following:    Height as of 7/8/16: 5' 9.25\" (1.759 m).    Weight as of an earlier encounter on 7/11/17: 235 lb (106.6 kg).  Medication Reconciliation: complete   Cecilia Castillo CMA      "

## 2017-07-13 LAB
CHOLEST SERPL-MCNC: 182 MG/DL
GLUCOSE SERPL-MCNC: 90 MG/DL (ref 70–99)
HDLC SERPL-MCNC: 54 MG/DL
LDLC SERPL CALC-MCNC: 81 MG/DL
NONHDLC SERPL-MCNC: 128 MG/DL
TRIGL SERPL-MCNC: 234 MG/DL

## 2017-08-01 ENCOUNTER — OFFICE VISIT (OUTPATIENT)
Dept: PODIATRY | Facility: CLINIC | Age: 62
End: 2017-08-01
Payer: OTHER MISCELLANEOUS

## 2017-08-01 ENCOUNTER — RADIANT APPOINTMENT (OUTPATIENT)
Dept: GENERAL RADIOLOGY | Facility: CLINIC | Age: 62
End: 2017-08-01
Attending: PODIATRIST
Payer: OTHER MISCELLANEOUS

## 2017-08-01 VITALS — OXYGEN SATURATION: 96 % | BODY MASS INDEX: 34.35 KG/M2 | HEART RATE: 110 BPM | WEIGHT: 236 LBS

## 2017-08-01 DIAGNOSIS — S82.65XA CLOSED NONDISPLACED FRACTURE OF LATERAL MALLEOLUS OF LEFT FIBULA, INITIAL ENCOUNTER: ICD-10-CM

## 2017-08-01 DIAGNOSIS — S92.355D CLOSED NONDISPLACED FRACTURE OF FIFTH METATARSAL BONE OF LEFT FOOT WITH ROUTINE HEALING, SUBSEQUENT ENCOUNTER: ICD-10-CM

## 2017-08-01 DIAGNOSIS — S82.65XA CLOSED NONDISPLACED FRACTURE OF LATERAL MALLEOLUS OF LEFT FIBULA, INITIAL ENCOUNTER: Primary | ICD-10-CM

## 2017-08-01 PROCEDURE — 73610 X-RAY EXAM OF ANKLE: CPT | Mod: RT

## 2017-08-01 PROCEDURE — 99207 ZZC FRACTURE CARE IN GLOBAL PERIOD: CPT | Performed by: PODIATRIST

## 2017-08-01 PROCEDURE — 73630 X-RAY EXAM OF FOOT: CPT | Mod: RT

## 2017-08-01 NOTE — NURSING NOTE
"Chief Complaint   Patient presents with     Follow Up For     Fracture     Right foot       Initial Pulse 110  Wt 107 kg (236 lb)  SpO2 96%  BMI 34.35 kg/m2 Estimated body mass index is 34.35 kg/(m^2) as calculated from the following:    Height as of 7/11/17: 1.765 m (5' 9.5\").    Weight as of this encounter: 107 kg (236 lb).  Medication Reconciliation: complete  "

## 2017-08-01 NOTE — PROGRESS NOTES
Subjective:    5/30/17  Patient seen as a new patient consult from Cain Dotson and is seen today  for right ankle sprain.  Had inversion sprain 1 days ago at work.   for "[employer].  Had pain and edema, seen in clinic, xrays taken, and given air cast and crutches.  Slightly better today.  Has been off foot.  No problems here before sprain.  Never smoked.     7/11/17  Patient feeling much better.  Walking now in ankalizer and minimal pain.  Was never on crutches and has been walking on this.      8/1/17  No pain walking in alkalizer.  Edema is decreasing.  Has tried walking in shoes and only slight discomfort.               ROS:  denies numbness, erythema, shortness of breath       Allergies   Allergen Reactions     Nkda [No Known Drug Allergies]        Current Outpatient Prescriptions   Medication Sig Dispense Refill     omeprazole 20 MG tablet Take 20 mg by mouth daily       atorvastatin (LIPITOR) 20 MG tablet Take 1 tablet (20 mg) by mouth daily 90 tablet 3     order for DME Right short cam walker 1 Device 0     order for DME Crutches 1 pair 1 Device 0     aspirin 81 MG tablet Take 81 mg by mouth daily       Multiple Vitamin (MULTI VITAMIN  MENS) TABS          Patient Active Problem List   Diagnosis     Hyperlipidemia     Adult BMI 30+     DJD (degenerative joint disease) of knee     HYPERLIPIDEMIA LDL GOAL <160       Past Medical History:   Diagnosis Date     Adult BMI 30+      DJD (degenerative joint disease) of knee     xray Oct., 2009     Hyperlipidemia      Squamous cell skin cancer 2007    chest       Past Surgical History:   Procedure Laterality Date     C APPENDECTOMY  1971     COLONOSCOPY  2016     SURGICAL HISTORY OF -   1995    wrist       Family History   Problem Relation Age of Onset     Hypertension Mother      HEART DISEASE Father      Hypertension Father      Lipids Father      Depression Father      Coronary Artery Disease Father      Bypass surgery - 3        Social History   Substance  Use Topics     Smoking status: Never Smoker     Smokeless tobacco: Not on file     Alcohol use No         Objective:    vitals noted.   Patient pleasant to talk with and in no apparent distress.   CRT < 3 seconds all toes.  No varicosities noted.   +2/4 DP & PT bilateral, sensation to light touch is intact.  Achilles reflex 2/4 bilateral.  Normal arch with weight bearing.  Muscle strength 5/5 in all compartments.  No pain with stressing any tendons.  Normal ROM all forefoot and rearfoot joints.     Minimal edema and no ecchymosis right foot/ankle lateral  negative erythema  negative pain at TMTJs   No pain right fifth styloid process.  negative Achilles or calcaneal tubercle pain  negative medial ankle pain  negative pain AITF ligament  No pain over ATFL/CFL/lateral ankle  negative pain with stressing or palpation peroneal tendons  negative peroneal subluxation  negative pain over medial   No  pain over lateral malleoli    X-rays- both ankle and foot x-ray show bone consolidation.        Assessment:  Right styloid process avulsion fracture                          Right lateral malleoli distal fracture      Plan:  X-rays taken today.  Discussed that both fractures healing.  Patient will start tomorrow walking in good supportive shoe.  If there is no pain or edema then will slowly increase time in shoe 1-2 hours per day until walking all day in good shoe.  If patient has pain or edema back in cam walker for 5 days and then will try again.  No running, jogging, or high impact activities until walking all day with no pain for 2 weeks.  Starting next week back to work for 4 hr days for next two weeks.  Gave patient ankle brace to wear at all times while walking until walking all day with no pain or instability.  will call if her feels he needs physical therapy.  Return to clinic prn.  Fracture care x 2.        Heri Cervantes DPM, FACFAS

## 2017-08-01 NOTE — MR AVS SNAPSHOT
After Visit Summary   8/1/2017    Mina Ames    MRN: [id number]           Patient Information     Date Of Birth          1955        Visit Information        Provider Department      8/1/2017 1:15 PM Heri Cervantes, DPM [facility]        Today's Diagnoses     Closed nondisplaced fracture of lateral malleolus of left fibula, initial encounter    -  1    Closed nondisplaced fracture of fifth metatarsal bone of left foot with routine healing, subsequent encounter          Care Instructions    We wish you continued good healing. If you have any questions or concerns, please do not hesitate to contact us at [phone]      Please remember to call and schedule a follow up appointment if one was recommended at your earliest convenience.   PODIATRY CLINIC HOURS  TELEPHONE NUMBER    Dr. Heri PAULPCARY [facility]    [facility]        Ekta Ahuja MA  Medical Assistant  Tuesday 1PM-6PM  [place]  Wednesday 7AM-2PM  [place]  Thursday 10AM-6PM  [place]  Friday 7AM-345PM  [place]  Specialty schedulers:   [phone] to make an appointment with any Specialty Provider.        Urgent Care locations:    [facility] Monday-Friday 5 pm - 9 pm. Saturday-Sunday 9 am -5pm    Monday-Friday 11 am - 9 pm Saturday 9 am - 5 pm     Monday-Sunday 12 noon-8PM [phone]     If you need a medication refill, please contact us you may need lab work and/or a follow up visit prior to your refill (i.e. Antifungal medications).    [employer] (secure e-mail communication and access to your chart) to send a message or to make an appointment.    If MRI needed please call [facility] at [phone]        Weight management plan: Patient was referred to their PCP to discuss a diet and exercise plan.            Follow-ups after your visit        Who  to contact     If you have questions or need follow up information about today's clinic visit or your schedule please contact [facility] directly at [phone].  Normal or non-critical lab and imaging results will be communicated to you by MyChart, letter or phone within 4 business days after the clinic has received the results. If you do not hear from us within 7 days, please contact the clinic through MyChart or phone. If you have a critical or abnormal lab result, we will notify you by phone as soon as possible.  Submit refill requests through [employer] or call your pharmacy and they will forward the refill request to us. Please allow 3 business days for your refill to be completed.          Additional Information About Your Visit        [employer] Information     [employer] gives you secure access to your electronic health record. If you see a primary care provider, you can also send messages to your care team and make appointments. If you have questions, please call your primary care clinic.  If you do not have a primary care provider, please call [phone] and they will assist you.        Care EveryWhere ID     This is your Care EveryWhere ID. This could be used by other organizations to access your [place] medical records  [id number]        Your Vitals Were     Pulse Pulse Oximetry BMI (Body Mass Index)             110 96% 34.35 kg/m2          Blood Pressure from Last 3 Encounters:   07/11/17 126/78   05/29/17 127/83   07/08/16 130/84    Weight from Last 3 Encounters:   08/01/17 107 kg (236 lb)   07/11/17 106.7 kg (235 lb 2 oz)   07/11/17 106.6 kg (235 lb)               Primary Care Provider Office Phone # Fax #    Tomas De La Cruz [phone]       [facility] [place] [address]  [place] [address]        Equal Access to Services     CALEB BURRELL AH: Hadii cain maldonadoo Soomaali, waaxda luqadaha, qaybta kaalmada adedejayapeter, greyson mancuso  ah. So [facility] [phone].    ATENCIÓN: Si higinio manzano, tiene a solano disposición servicios gratuitos de asistencia lingüística. Donna al [phone].    We comply with applicable federal civil rights laws and [place] laws. We do not discriminate on the basis of race, color, national origin, age, disability sex, sexual orientation or gender identity.            Thank you!     Thank you for choosing [facility]  for your care. Our goal is always to provide you with excellent care. Hearing back from our patients is one way we can continue to improve our services. Please take a few minutes to complete the written survey that you may receive in the mail after your visit with us. Thank you!             Your Updated Medication List - Protect others around you: Learn how to safely use, store and throw away your medicines at www.disposemymeds.org.          This list is accurate as of: 8/1/17  4:17 PM.  Always use your most recent med list.                   Brand Name Dispense Instructions for use Diagnosis    aspirin 81 MG tablet      Take 81 mg by mouth daily        atorvastatin 20 MG tablet    LIPITOR    90 tablet    Take 1 tablet (20 mg) by mouth daily    Hyperlipidemia LDL goal <160       MULTI vitamin  MENS Tabs           omeprazole 20 MG tablet      Take 20 mg by mouth daily        * order for DME     1 Device    Right short cam walker    Fracture of right foot, closed, initial encounter       * order for DME     1 Device    Crutches 1 pair    Fracture of right foot, closed, initial encounter       * Notice:  This list has 2 medication(s) that are the same as other medications prescribed for you. Read the directions carefully, and ask your doctor or other care provider to review them with you.

## 2017-08-01 NOTE — PATIENT INSTRUCTIONS
We wish you continued good healing. If you have any questions or concerns, please do not hesitate to contact us at [phone]      Please remember to call and schedule a follow up appointment if one was recommended at your earliest convenience.   PODIATRY CLINIC HOURS  TELEPHONE NUMBER    Dr. Heri Cervantes D.P.[facility]    Clinics:  [facility]        Ekta Ahuja MA  Medical Assistant  Tuesday 1PM-6PM  [facility]  Wednesday 7AM-2PM  [place]  Thursday 10AM-6PM  [place] Friday 7AM-345PM  [place]  Specialty schedulers:   [phone] to make an appointment with any Specialty Provider.        Urgent Care locations:    [facility] Monday-Friday 5 pm - 9 pm. Saturday-Sunday 9 am -5pm    Monday-Friday 11 am - 9 pm Saturday 9 am - 5 pm     Monday-Sunday 12 noon-8PM [phone]     If you need a medication refill, please contact us you may need lab work and/or a follow up visit prior to your refill (i.e. Antifungal medications).    [employer] (secure e-mail communication and access to your chart) to send a message or to make an appointment.    If MRI needed please call [facility] at [phone]        Weight management plan: Patient was referred to their PCP to discuss a diet and exercise plan.

## 2017-08-01 NOTE — LETTER
[address]  [place] [address]  Phone: [phone]    August 1, 2017        Mina Ames  [address] [address]          To whom it may concern:    RE: Mina Ames    Patient was seen and treated today at our clinic.    Patient may return to work 8/7/17 thru 8/21/17 with the following:  Working no more than 4 hours.    When the patient returns to work,8/22/17 No Restrictions:      Please contact me for questions or concerns.      Sincerely,        Heri Cervantes DPM

## 2017-11-16 ENCOUNTER — RADIANT APPOINTMENT (OUTPATIENT)
Dept: GENERAL RADIOLOGY | Facility: CLINIC | Age: 62
End: 2017-11-16
Attending: PODIATRIST
Payer: OTHER MISCELLANEOUS

## 2017-11-16 ENCOUNTER — OFFICE VISIT (OUTPATIENT)
Dept: PODIATRY | Facility: CLINIC | Age: 62
End: 2017-11-16
Payer: OTHER MISCELLANEOUS

## 2017-11-16 VITALS — HEART RATE: 78 BPM | OXYGEN SATURATION: 97 %

## 2017-11-16 DIAGNOSIS — S92.355D CLOSED NONDISPLACED FRACTURE OF FIFTH METATARSAL BONE OF LEFT FOOT WITH ROUTINE HEALING, SUBSEQUENT ENCOUNTER: ICD-10-CM

## 2017-11-16 DIAGNOSIS — S82.65XA CLOSED NONDISPLACED FRACTURE OF LATERAL MALLEOLUS OF LEFT FIBULA, INITIAL ENCOUNTER: Primary | ICD-10-CM

## 2017-11-16 DIAGNOSIS — S82.65XA CLOSED NONDISPLACED FRACTURE OF LATERAL MALLEOLUS OF LEFT FIBULA, INITIAL ENCOUNTER: ICD-10-CM

## 2017-11-16 PROCEDURE — 73610 X-RAY EXAM OF ANKLE: CPT | Mod: RT

## 2017-11-16 PROCEDURE — 73630 X-RAY EXAM OF FOOT: CPT | Mod: RT

## 2017-11-16 PROCEDURE — 99213 OFFICE O/P EST LOW 20 MIN: CPT | Performed by: PODIATRIST

## 2017-11-16 NOTE — MR AVS SNAPSHOT
After Visit Summary   11/16/2017    Mina Ames    MRN: [id number]           Patient Information     Date Of Birth          1955        Visit Information        Provider Department      11/16/2017 5:00 PM Heri Cervantes, DPM [facility]        Today's Diagnoses     Closed nondisplaced fracture of lateral malleolus of left fibula, initial encounter    -  1    Closed nondisplaced fracture of fifth metatarsal bone of left foot with routine healing, subsequent encounter          Care Instructions    We wish you continued good healing. If you have any questions or concerns, please do not hesitate to contact us at [phone]      Please remember to call and schedule a follow up appointment if one was recommended at your earliest convenience.   PODIATRY CLINIC HOURS  TELEPHONE NUMBER    Dr. Heri PAULPCARY [facility]    [facility]        Ekta Ahuja MA  Medical Assistant  Tuesday 1PM-6PM  [place]  Wednesday 7AM-2PM  [place]  Thursday 10AM-6PM  [place]  Friday 7AM-345PM  [place]  Specialty schedulers:   [phone] to make an appointment with any Specialty Provider.        Urgent Care locations:    [place] Monday-Friday 5 pm - 9 pm. Saturday-Sunday 9 am -5pm    Monday-Friday 11 am - 9 pm Saturday 9 am - 5 pm     Monday-Sunday 12 noon-8PM [phone]     If you need a medication refill, please contact us you may need lab work and/or a follow up visit prior to your refill (i.e. Antifungal medications).    [employer] (secure e-mail communication and access to your chart) to send a message or to make an appointment.    If MRI needed please call [facility] at [phone]        Weight management plan: Patient was referred to their PCP to discuss a diet and exercise plan.            Follow-ups after your visit         Who to contact     If you have questions or need follow up information about today's clinic visit or your schedule please contact [facility] directly at [phone].  Normal or non-critical lab and imaging results will be communicated to you by MyChart, letter or phone within 4 business days after the clinic has received the results. If you do not hear from us within 7 days, please contact the clinic through MyChart or phone. If you have a critical or abnormal lab result, we will notify you by phone as soon as possible.  Submit refill requests through [employer] or call your pharmacy and they will forward the refill request to us. Please allow 3 business days for your refill to be completed.          Additional Information About Your Visit        [employer] Information     [employer] gives you secure access to your electronic health record. If you see a primary care provider, you can also send messages to your care team and make appointments. If you have questions, please call your primary care clinic.  If you do not have a primary care provider, please call [phone] and they will assist you.        Care EveryWhere ID     This is your Care EveryWhere ID. This could be used by other organizations to access your [facility] medical records  [id number]        Your Vitals Were     Pulse Pulse Oximetry                78 97%           Blood Pressure from Last 3 Encounters:   07/11/17 126/78   05/29/17 127/83   07/08/16 130/84    Weight from Last 3 Encounters:   08/01/17 236 lb (107 kg)   07/11/17 235 lb 2 oz (106.7 kg)   07/11/17 235 lb (106.6 kg)               Primary Care Provider Office Phone # Fax #    Tomas De La Cruz [phone]       [facility] [place] [address]  [place] [address]        Equal Access to Services     VALERY BURRELL : Jim Wing, jose bello, jonathan rdz, greyson aceves. So [facility]  [phone].    ATENCIÓN: Si higinio manzano, tiene a solano disposición servicios gratuitos de asistencia lingüística. Donna zepeda [phone].    We comply with applicable federal civil rights laws and [place] laws. We do not discriminate on the basis of race, color, national origin, age, disability, sex, sexual orientation, or gender identity.            Thank you!     Thank you for choosing [facility]  for your care. Our goal is always to provide you with excellent care. Hearing back from our patients is one way we can continue to improve our services. Please take a few minutes to complete the written survey that you may receive in the mail after your visit with us. Thank you!             Your Updated Medication List - Protect others around you: Learn how to safely use, store and throw away your medicines at www.disposemymeds.org.          This list is accurate as of: 11/16/17  6:27 PM.  Always use your most recent med list.                   Brand Name Dispense Instructions for use Diagnosis    aspirin 81 MG tablet      Take 81 mg by mouth daily        atorvastatin 20 MG tablet    LIPITOR    90 tablet    Take 1 tablet (20 mg) by mouth daily    Hyperlipidemia LDL goal <160       MULTI vitamin  MENS Tabs           omeprazole 20 MG tablet      Take 20 mg by mouth daily        * order for DME     1 Device    Right short cam walker    Fracture of right foot, closed, initial encounter       * order for DME     1 Device    Crutches 1 pair    Fracture of right foot, closed, initial encounter       * Notice:  This list has 2 medication(s) that are the same as other medications prescribed for you. Read the directions carefully, and ask your doctor or other care provider to review them with you.

## 2017-11-16 NOTE — PROGRESS NOTES
Subjective:    5/30/17  Patient seen as a new patient consult from Cain Dotson and is seen today  for right ankle sprain.  Had inversion sprain 1 days ago at work.   for [employer].  Had pain and edema, seen in clinic, xrays taken, and given air cast and crutches.  Slightly better today.  Has been off foot.  No problems here before sprain.  Never smoked.     7/11/17  Patient feeling much better.  Walking now in ankalizer and minimal pain.  Was never on crutches and has been walking on this.      8/1/17  No pain walking in alkalizer.  Edema is decreasing.  Has tried walking in shoes and only slight discomfort.    11/16/17  Patient walking in shoes now all day and has no pain.  Has no instability.  No edema.  No snapping lateral.  He is her to be cleared for work               ROS:  See above.  denies numbness, erythema, eccymosis       Allergies   Allergen Reactions     Nkda [No Known Drug Allergies]        Current Outpatient Prescriptions   Medication Sig Dispense Refill     omeprazole 20 MG tablet Take 20 mg by mouth daily       atorvastatin (LIPITOR) 20 MG tablet Take 1 tablet (20 mg) by mouth daily 90 tablet 3     order for DME Right short cam walker 1 Device 0     order for DME Crutches 1 pair 1 Device 0     aspirin 81 MG tablet Take 81 mg by mouth daily       Multiple Vitamin (MULTI VITAMIN  MENS) TABS          Patient Active Problem List   Diagnosis     Hyperlipidemia     Adult BMI 30+     DJD (degenerative joint disease) of knee     HYPERLIPIDEMIA LDL GOAL <160       Past Medical History:   Diagnosis Date     Adult BMI 30+      DJD (degenerative joint disease) of knee     xray Oct., 2009     Hyperlipidemia      Squamous cell skin cancer 2007    chest       Past Surgical History:   Procedure Laterality Date     C APPENDECTOMY  1971     COLONOSCOPY  2016     SURGICAL HISTORY OF -   1995    wrist       Family History   Problem Relation Age of Onset     Hypertension Mother      HEART DISEASE Father       Hypertension Father      Lipids Father      Depression Father      Coronary Artery Disease Father      Bypass surgery - 3        Social History   Substance Use Topics     Smoking status: Never Smoker     Smokeless tobacco: Not on file     Alcohol use No         Objective:    vitals noted.   Patient pleasant to talk with and in no apparent distress.   CRT < 3 seconds all toes.  No varicosities noted.   +2/4 DP & PT bilateral, sensation to light touch is intact.  Achilles reflex 2/4 bilateral.  Normal arch with weight bearing.  Muscle strength 5/5 in all compartments.  No pain with stressing any tendons.  Normal ROM all forefoot and rearfoot joints.     no edema and no ecchymosis right foot/ankle lateral  negative erythema  negative pain at TMTJs   No pain right fifth styloid process.  negative Achilles or calcaneal tubercle pain  negative medial ankle pain  negative pain AITF ligament  No pain over ATFL/CFL/lateral ankle  negative pain with stressing or palpation peroneal tendons  negative peroneal subluxation  negative pain over medial   No  pain over lateral malleoli    X-rays- both ankle and foot x-ray show bone consolidation has continued and these are stable.        Assessment:  Right styloid process avulsion fracture, healed                          Right lateral malleoli distal fracture, healed      Plan:  X-rays taken today.  Discussed that both fractures radiographicly  have continued to consolidate and clinically he has no pain, edema, or sequela.  May work with no restrictions.  Return to clinic prn.        Heri Cervantes DPM, FACFAS

## 2017-11-16 NOTE — NURSING NOTE
"Chief Complaint   Patient presents with     Follow Up For     Ankle Pain     Right ankle Needs OK to return to work       Initial Pulse 78  SpO2 97% Estimated body mass index is 34.35 kg/(m^2) as calculated from the following:    Height as of 7/11/17: 5' 9.5\" (1.765 m).    Weight as of 8/1/17: 236 lb (107 kg).  Medication Reconciliation: complete  "

## 2017-11-16 NOTE — PATIENT INSTRUCTIONS
We wish you continued good healing. If you have any questions or concerns, please do not hesitate to contact us at [phone]      Please remember to call and schedule a follow up appointment if one was recommended at your earliest convenience.   PODIATRY CLINIC HOURS  TELEPHONE NUMBER    Dr. Heri Cervantes D.P.M [facility]    Clinics:  [facility]        Ekta Ahuja MA  Medical Assistant  Tuesday 1PM-6PM  [facility]  Wednesday 7AM-2PM  [place]  Thursday 10AM-6PM  [place] Friday 7AM-345PM  [place]  Specialty schedulers:   [phone] to make an appointment with any Specialty Provider.        Urgent Care locations:    [facility] Monday-Friday 5 pm - 9 pm. Saturday-Sunday 9 am -5pm    Monday-Friday 11 am - 9 pm Saturday 9 am - 5 pm     Monday-Sunday 12 noon-8PM [phone]     If you need a medication refill, please contact us you may need lab work and/or a follow up visit prior to your refill (i.e. Antifungal medications).    [employer] (secure e-mail communication and access to your chart) to send a message or to make an appointment.    If MRI needed please call [facility] at [phone]        Weight management plan: Patient was referred to their PCP to discuss a diet and exercise plan.

## 2017-11-16 NOTE — LETTER
11/16/2017         RE: Mina Ames  [address]  [place] [facility] [place] [address]        Dear Colleague,    Thank you for referring your patient, Mina Ames, to the [facility]. Please see a copy of my visit note below.    Subjective:    5/30/17  Patient seen as a new patient consult from Cain Dotson and is seen today  for right ankle sprain.  Had inversion sprain 1 days ago at work.   for [employer].  Had pain and edema, seen in clinic, xrays taken, and given air cast and crutches.  Slightly better today.  Has been off foot.  No problems here before sprain.  Never smoked.     7/11/17  Patient feeling much better.  Walking now in ankalizer and minimal pain.  Was never on crutches and has been walking on this.      8/1/17  No pain walking in alkalizer.  Edema is decreasing.  Has tried walking in shoes and only slight discomfort.    11/16/17  Patient walking in shoes now all day and has no pain.  Has no instability.  No edema.  No snapping lateral.  He is her to be cleared for work               ROS:  See above.  denies numbness, erythema, eccymosis       Allergies   Allergen Reactions     Nkda [No Known Drug Allergies]        Current Outpatient Prescriptions   Medication Sig Dispense Refill     omeprazole 20 MG tablet Take 20 mg by mouth daily       atorvastatin (LIPITOR) 20 MG tablet Take 1 tablet (20 mg) by mouth daily 90 tablet 3     order for DME Right short cam walker 1 Device 0     order for DME Crutches 1 pair 1 Device 0     aspirin 81 MG tablet Take 81 mg by mouth daily       Multiple Vitamin (MULTI VITAMIN  MENS) TABS          Patient Active Problem List   Diagnosis     Hyperlipidemia     Adult BMI 30+     DJD (degenerative joint disease) of knee     HYPERLIPIDEMIA LDL GOAL <160       Past Medical History:   Diagnosis Date     Adult BMI 30+      DJD (degenerative joint disease) of knee     xray Oct., 2009     Hyperlipidemia      Squamous cell skin cancer 2007    chest        Past Surgical History:   Procedure Laterality Date     C APPENDECTOMY  1971     COLONOSCOPY  2016     SURGICAL HISTORY OF -   1995    wrist       Family History   Problem Relation Age of Onset     Hypertension Mother      HEART DISEASE Father      Hypertension Father      Lipids Father      Depression Father      Coronary Artery Disease Father      Bypass surgery - 3        Social History   Substance Use Topics     Smoking status: Never Smoker     Smokeless tobacco: Not on file     Alcohol use No         Objective:    vitals noted.   Patient pleasant to talk with and in no apparent distress.   CRT < 3 seconds all toes.  No varicosities noted.   +2/4 DP & PT bilateral, sensation to light touch is intact.  Achilles reflex 2/4 bilateral.  Normal arch with weight bearing.  Muscle strength 5/5 in all compartments.  No pain with stressing any tendons.  Normal ROM all forefoot and rearfoot joints.     no edema and no ecchymosis right foot/ankle lateral  negative erythema  negative pain at TMTJs   No pain right fifth styloid process.  negative Achilles or calcaneal tubercle pain  negative medial ankle pain  negative pain AITF ligament  No pain over ATFL/CFL/lateral ankle  negative pain with stressing or palpation peroneal tendons  negative peroneal subluxation  negative pain over medial   No  pain over lateral malleoli    X-rays- both ankle and foot x-ray show bone consolidation has continued and these are stable.        Assessment:  Right styloid process avulsion fracture, healed                          Right lateral malleoli distal fracture, healed      Plan:  X-rays taken today.  Discussed that both fractures radiographicly  have continued to consolidate and clinically he has no pain, edema, or sequela.  May work with no restrictions.  Return to clinic prn.        Heri Cervantes DPM, FACFAS    Again, thank you for allowing me to participate in the care of your patient.        Sincerely,        Heri Cervantes,  KEHINDE

## 2018-02-28 ENCOUNTER — TRANSFERRED RECORDS (OUTPATIENT)
Dept: HEALTH INFORMATION MANAGEMENT | Facility: CLINIC | Age: 63
End: 2018-02-28

## 2018-06-08 ENCOUNTER — OFFICE VISIT (OUTPATIENT)
Dept: FAMILY MEDICINE | Facility: CLINIC | Age: 63
End: 2018-06-08
Payer: OTHER MISCELLANEOUS

## 2018-06-08 VITALS
SYSTOLIC BLOOD PRESSURE: 128 MMHG | HEIGHT: 70 IN | BODY MASS INDEX: 32.78 KG/M2 | TEMPERATURE: 98.5 F | WEIGHT: 229 LBS | DIASTOLIC BLOOD PRESSURE: 88 MMHG | HEART RATE: 76 BPM

## 2018-06-08 DIAGNOSIS — E78.5 HYPERLIPIDEMIA LDL GOAL <160: ICD-10-CM

## 2018-06-08 DIAGNOSIS — Z00.00 ROUTINE GENERAL MEDICAL EXAMINATION AT A HEALTH CARE FACILITY: Primary | ICD-10-CM

## 2018-06-08 PROCEDURE — 99396 PREV VISIT EST AGE 40-64: CPT | Performed by: PHYSICIAN ASSISTANT

## 2018-06-08 PROCEDURE — 82947 ASSAY GLUCOSE BLOOD QUANT: CPT | Performed by: PHYSICIAN ASSISTANT

## 2018-06-08 PROCEDURE — 36415 COLL VENOUS BLD VENIPUNCTURE: CPT | Performed by: PHYSICIAN ASSISTANT

## 2018-06-08 PROCEDURE — 80061 LIPID PANEL: CPT | Performed by: PHYSICIAN ASSISTANT

## 2018-06-08 PROCEDURE — G0103 PSA SCREENING: HCPCS | Performed by: PHYSICIAN ASSISTANT

## 2018-06-08 RX ORDER — ATORVASTATIN CALCIUM 20 MG/1
20 TABLET, FILM COATED ORAL DAILY
Qty: 90 TABLET | Refills: 3 | Status: SHIPPED | OUTPATIENT
Start: 2018-06-08 | End: 2019-07-28

## 2018-06-08 NOTE — PROGRESS NOTES
SUBJECTIVE:   CC: Mina Ames is an 62 year old male who presents for preventative health visit.     Physical   Annual:     Getting at least 3 servings of Calcium per day::  Yes    Bi-annual eye exam::  NO    Dental care twice a year::  Yes    Sleep apnea or symptoms of sleep apnea::  None    Diet::  Regular (no restrictions)    Frequency of exercise::  4-5 days/week    Duration of exercise::  30-45 minutes    Taking medications regularly::  Yes    Medication side effects::  None    Additional concerns today::  No            Today's PHQ-2 Score:   PHQ-2 ( 1999 [employer]) 6/6/2018   Q1: Little interest or pleasure in doing things 0   Q2: Feeling down, depressed or hopeless 0   PHQ-2 Score 0   Q1: Little interest or pleasure in doing things Not at all   Q2: Feeling down, depressed or hopeless Not at all   PHQ-2 Score 0       Abuse: Current or Past(Physical, Sexual or Emotional)- No  Do you feel safe in your environment - Yes    Social History   Substance Use Topics     Smoking status: Never Smoker     Smokeless tobacco: Never Used     Alcohol use No     Alcohol Use 6/6/2018   If you drink alcohol do you typically have greater than 3 drinks per day OR greater than 7 drinks per week? Not Applicable   No flowsheet data found.    Last PSA:   PSA   Date Value Ref Range Status   06/07/2016 0.77 0 - 4 ug/L Final       Reviewed orders with patient. Reviewed health maintenance and updated orders accordingly - Yes  Labs reviewed in EPIC    Reviewed and updated as needed this visit by clinical staff  Tobacco  Allergies  Meds  Med Hx  Surg Hx  Fam Hx  Soc Hx        Reviewed and updated as needed this visit by Provider  Surg Hx            Review of Systems  CONSTITUTIONAL: NEGATIVE for fever, chills, change in weight  INTEGUMENTARY/SKIN: NEGATIVE for worrisome rashes, moles or lesions  EYES: NEGATIVE for vision changes or irritation  ENT: NEGATIVE for ear, mouth and throat problems  RESP: NEGATIVE for significant cough or  "SOB  CV: NEGATIVE for chest pain, palpitations or peripheral edema  GI: NEGATIVE for nausea, abdominal pain, heartburn, or change in bowel habits   male: negative for dysuria, hematuria, decreased urinary stream, erectile dysfunction, urethral discharge  MUSCULOSKELETAL: NEGATIVE for significant arthralgias or myalgia  NEURO: NEGATIVE for weakness, dizziness or paresthesias  PSYCHIATRIC: NEGATIVE for changes in mood or affect    OBJECTIVE:   /88 (Cuff Size: Adult Large)  Pulse 76  Temp 98.5  F (36.9  C) (Oral)  Ht 5' 9.5\" (1.765 m)  Wt 229 lb (103.9 kg)  BMI 33.33 kg/m2    Physical Exam  GENERAL: healthy, alert and no distress  HENT: ear canals and TM's normal, nose and mouth without ulcers or lesions  NECK: no adenopathy, no asymmetry, masses, or scars and thyroid normal to palpation  RESP: lungs clear to auscultation - no rales, rhonchi or wheezes  CV: regular rate and rhythm, normal S1 S2, no S3 or S4, no murmur, click or rub, no peripheral edema and peripheral pulses strong  ABDOMEN: soft, nontender, no hepatosplenomegaly, no masses and bowel sounds normal  MS: no gross musculoskeletal defects noted, no edema  SKIN: no suspicious lesions or rashes  NEURO: Normal strength and tone, mentation intact and speech normal  PSYCH: mentation appears normal, affect normal/bright  LYMPH: no cervical, supraclavicular, axillary, or inguinal adenopathy    ASSESSMENT/PLAN:   (Z00.00) Routine general medical examination at a health care facility  (primary encounter diagnosis)  Comment: Well person   Plan: Lipid panel reflex to direct LDL Fasting,         Glucose, Prostate spec antigen screen        Diet, exercise, wellness and other preventive recommendations related to health maintenance were discussed.  Follow up as needed for acute issues.  Physical exam in 1 year.     (E78.5) Hyperlipidemia LDL goal <160  Comment:   Plan: atorvastatin (LIPITOR) 20 MG tablet        Refills    COUNSELING:   Reviewed preventive " "health counseling, as reflected in patient instructions         reports that he has never smoked. He has never used smokeless tobacco.    Estimated body mass index is 33.33 kg/(m^2) as calculated from the following:    Height as of this encounter: 5' 9.5\" (1.765 m).    Weight as of this encounter: 229 lb (103.9 kg).       Counseling Resources:  ATP IV Guidelines  Pooled Cohorts Equation Calculator  FRAX Risk Assessment  ICSI Preventive Guidelines  Dietary Guidelines for Americans, 2010  USDA's MyPlate  ASA Prophylaxis  Lung CA Screening    BJORN KNUTSON PA-C  [facility]  Answers for HPI/ROS submitted by the patient on 6/6/2018   PHQ-2 Score: 0    "

## 2018-06-08 NOTE — MR AVS SNAPSHOT
After Visit Summary   6/8/2018    Mina Ames    MRN: [id number]           Patient Information     Date Of Birth          1955        Visit Information        Provider Department      6/8/2018 9:40 AM Escobar Curry PA-C [facility]        Today's Diagnoses     Routine general medical examination at a health care facility    -  1    Hyperlipidemia LDL goal <160          Care Instructions      Preventive Health Recommendations  Male Ages 50 - 64    Yearly exam:             See your health care provider every year in order to  o   Review health changes.   o   Discuss preventive care.    o   Review your medicines if your doctor has prescribed any.     Have a cholesterol test every 5 years, or more frequently if you are at risk for high cholesterol/heart disease.     Have a diabetes test (fasting glucose) every three years. If you are at risk for diabetes, you should have this test more often.     Have a colonoscopy at age 50, or have a yearly FIT test (stool test). These exams will check for colon cancer.      Talk with your health care provider about whether or not a prostate cancer screening test (PSA) is right for you.    You should be tested each year for STDs (sexually transmitted diseases), if you re at risk.     Shots: Get a flu shot each year. Get a tetanus shot every 10 years.     Nutrition:    Eat at least 5 servings of fruits and vegetables daily.     Eat whole-grain bread, whole-wheat pasta and brown rice instead of white grains and rice.     Talk to your provider about Calcium and Vitamin D.     Lifestyle    Exercise for at least 150 minutes a week (30 minutes a day, 5 days a week). This will help you control your weight and prevent disease.     Limit alcohol to one drink per day.     No smoking.     Wear sunscreen to prevent skin cancer.     See your dentist every six months for an exam and cleaning.     See your eye doctor every 1 to 2 years.             "Follow-ups after your visit        Who to contact     If you have questions or need follow up information about today's clinic visit or your schedule please contact [facility] directly at [phone].  Normal or non-critical lab and imaging results will be communicated to you by MyChart, letter or phone within 4 business days after the clinic has received the results. If you do not hear from us within 7 days, please contact the clinic through MyChart or phone. If you have a critical or abnormal lab result, we will notify you by phone as soon as possible.  Submit refill requests through [employer] or call your pharmacy and they will forward the refill request to us. Please allow 3 business days for your refill to be completed.          Additional Information About Your Visit        [employer] Information     [employer] gives you secure access to your electronic health record. If you see a primary care provider, you can also send messages to your care team and make appointments. If you have questions, please call your primary care clinic.  If you do not have a primary care provider, please call [phone] and they will assist you.        Care EveryWhere ID     This is your Care EveryWhere ID. This could be used by other organizations to access your [place] medical records  [id number]        Your Vitals Were     Pulse Temperature Height BMI (Body Mass Index)          76 98.5  F (36.9  C) (Oral) 5' 9.5\" (1.765 m) 33.33 kg/m2         Blood Pressure from Last 3 Encounters:   06/08/18 128/88   07/11/17 126/78   05/29/17 127/83    Weight from Last 3 Encounters:   06/08/18 229 lb (103.9 kg)   08/01/17 236 lb (107 kg)   07/11/17 235 lb 2 oz (106.7 kg)              We Performed the Following     Glucose     Lipid panel reflex to direct LDL Fasting     Prostate spec antigen screen          Where to get your medicines      These medications were sent to [facility] - [address] " [address] [address]     Phone:  [phone]     atorvastatin 20 MG tablet          Primary Care Provider Office Phone # Fax #    Tomas De La Cruz [phone]       [facility]J VALENZUELA [address]  [place] [address]        Equal Access to Services     [facility] [place]: Hadii aad ku hadasho Soomaali, waaxda luqadaha, qaybta kaalmada adeegyada, waxay idiin hayaan adeeg kharash la'aan . So [facility] [phone].    ATENCIÓN: Si habla español, tiene a solano disposición servicios gratuitos de asistencia lingüística. Donna al [phone].    We comply with applicable federal civil rights laws and [place] laws. We do not discriminate on the basis of race, color, national origin, age, disability, sex, sexual orientation, or gender identity.            Thank you!     Thank you for choosing [facility]  for your care. Our goal is always to provide you with excellent care. Hearing back from our patients is one way we can continue to improve our services. Please take a few minutes to complete the written survey that you may receive in the mail after your visit with us. Thank you!             Your Updated Medication List - Protect others around you: Learn how to safely use, store and throw away your medicines at www.disposemymeds.org.          This list is accurate as of 6/8/18 10:13 AM.  Always use your most recent med list.                   Brand Name Dispense Instructions for use Diagnosis    aspirin 81 MG tablet      Take 81 mg by mouth daily        atorvastatin 20 MG tablet    LIPITOR    90 tablet    Take 1 tablet (20 mg) by mouth daily    Hyperlipidemia LDL goal <160       MULTI vitamin  MENS Tabs           omeprazole 20 MG tablet      Take 20 mg by mouth daily

## 2018-06-09 LAB
CHOLEST SERPL-MCNC: 138 MG/DL
GLUCOSE SERPL-MCNC: 107 MG/DL (ref 70–99)
HDLC SERPL-MCNC: 54 MG/DL
LDLC SERPL CALC-MCNC: 62 MG/DL
NONHDLC SERPL-MCNC: 84 MG/DL
PSA SERPL-ACNC: 0.8 UG/L (ref 0–4)
TRIGL SERPL-MCNC: 109 MG/DL

## 2018-12-05 ENCOUNTER — TRANSFERRED RECORDS (OUTPATIENT)
Dept: HEALTH INFORMATION MANAGEMENT | Facility: CLINIC | Age: 63
End: 2018-12-05

## 2018-12-06 ENCOUNTER — OFFICE VISIT (OUTPATIENT)
Dept: PODIATRY | Facility: CLINIC | Age: 63
End: 2018-12-06
Payer: OTHER MISCELLANEOUS

## 2018-12-06 ENCOUNTER — RADIANT APPOINTMENT (OUTPATIENT)
Dept: GENERAL RADIOLOGY | Facility: CLINIC | Age: 63
End: 2018-12-06
Attending: PODIATRIST
Payer: COMMERCIAL

## 2018-12-06 VITALS
BODY MASS INDEX: 30.57 KG/M2 | SYSTOLIC BLOOD PRESSURE: 126 MMHG | DIASTOLIC BLOOD PRESSURE: 78 MMHG | HEART RATE: 86 BPM | WEIGHT: 210 LBS

## 2018-12-06 DIAGNOSIS — S93.602A SPRAIN OF LEFT FOOT, INITIAL ENCOUNTER: Primary | ICD-10-CM

## 2018-12-06 DIAGNOSIS — S93.602A SPRAIN OF LEFT FOOT, INITIAL ENCOUNTER: ICD-10-CM

## 2018-12-06 PROCEDURE — 73630 X-RAY EXAM OF FOOT: CPT | Mod: LT

## 2018-12-06 PROCEDURE — 99214 OFFICE O/P EST MOD 30 MIN: CPT | Performed by: PODIATRIST

## 2018-12-06 NOTE — MR AVS SNAPSHOT
After Visit Summary   12/6/2018    Mina Ames    MRN: [id number]           Patient Information     Date Of Birth          1955        Visit Information        Provider Department      12/6/2018 5:00 PM Heri Cervantes DPM [facility]        Today's Diagnoses     Sprain of left foot, initial encounter    -  1      Care Instructions    We wish you continued good healing. If you have any questions or concerns, please do not hesitate to contact us at [phone]    Please remember to call and schedule a follow up appointment if one was recommended at your earliest convenience.   PODIATRY CLINIC HOURS  TELEPHONE NUMBER    Dr. Heri Cervantes D.P.M FAC FAS    Clinics:  [facility]    [facility]   Tuesday 1PM-6PM  [place]  Wednesday 7AM-2PM  [place]  Thursday 10AM-6PM  [place]  Friday 7AM-3PM  [place]  Specialty schedulers:   [phone] to make an appointment with any Specialty Provider.        Urgent Care locations:    [facility] Monday-Friday 5 pm - 9 pm. Saturday-Sunday 9 am -5pm    Monday-Friday 11 am - 9 pm Saturday 9 am - 5 pm     Monday-Sunday 12 noon-8PM [phone]     If you need a medication refill, please contact us you may need lab work and/or a follow up visit prior to your refill (i.e. Antifungal medications).    [employer] (secure e-mail communication and access to your chart) to send a message or to make an appointment.    If MRI needed please call [facility] at [phone]        Weight management plan: Patient was referred to their PCP to discuss a diet and exercise plan.            Follow-ups after your visit        Your next 10 appointments already scheduled     Dec 20, 2018 10:15 AM CST   Return Visit with Heri Cervantes DPM   [facility] ([facility]    [address]  [address]  [place] [address] [phone]              Who to contact     If you have questions or need follow up information about today's clinic visit or your schedule please contact [facility] directly at [phone].  Normal or non-critical lab and imaging results will be communicated to you by MyChart, letter or phone within 4 business days after the clinic has received the results. If you do not hear from us within 7 days, please contact the clinic through MyChart or phone. If you have a critical or abnormal lab result, we will notify you by phone as soon as possible.  Submit refill requests through [employer] or call your pharmacy and they will forward the refill request to us. Please allow 3 business days for your refill to be completed.          Additional Information About Your Visit        MyChart Information     [employer] gives you secure access to your electronic health record. If you see a primary care provider, you can also send messages to your care team and make appointments. If you have questions, please call your primary care clinic.  If you do not have a primary care provider, please call [phone] and they will assist you.        Care EveryWhere ID     This is your Care EveryWhere ID. This could be used by other organizations to access your [place] medical records  [id number]        Your Vitals Were     Pulse BMI (Body Mass Index)                86 30.57 kg/m2           Blood Pressure from Last 3 Encounters:   12/06/18 126/78   06/08/18 128/88   07/11/17 126/78    Weight from Last 3 Encounters:   12/06/18 210 lb (95.3 kg)   06/08/18 229 lb (103.9 kg)   08/01/17 236 lb (107 kg)               Primary Care Provider Office Phone # Fax #    Escobar Curry PA-C [phone]       [facility] [address]        Equal Access to Services     [facility]: Jim Wing, wamary jane bello, qaybta kaalbran rdz, greyson gandhi  kev gardnernievesbraeden eppersonSeemaaaobinna ah. So [facility] [phone].    ATENCIÓN: Si habla natacha, tiene a solano disposición servicios gratuitos de asistencia lingüística. Donna al [phone].    We comply with applicable federal civil rights laws and [place] laws. We do not discriminate on the basis of race, color, national origin, age, disability, sex, sexual orientation, or gender identity.            Thank you!     Thank you for choosing [facility]  for your care. Our goal is always to provide you with excellent care. Hearing back from our patients is one way we can continue to improve our services. Please take a few minutes to complete the written survey that you may receive in the mail after your visit with us. Thank you!             Your Updated Medication List - Protect others around you: Learn how to safely use, store and throw away your medicines at www.disposemymeds.org.          This list is accurate as of 12/6/18 11:59 PM.  Always use your most recent med list.                   Brand Name Dispense Instructions for use Diagnosis    aspirin 81 MG tablet    ASA     Take 81 mg by mouth daily        atorvastatin 20 MG tablet    LIPITOR    90 tablet    Take 1 tablet (20 mg) by mouth daily    Hyperlipidemia LDL goal <160       MULTI vitamin  MENS Tabs           omeprazole 20 MG tablet      Take 20 mg by mouth daily

## 2018-12-06 NOTE — PATIENT INSTRUCTIONS
We wish you continued good healing. If you have any questions or concerns, please do not hesitate to contact us at [phone]    Please remember to call and schedule a follow up appointment if one was recommended at your earliest convenience.   PODIATRY CLINIC HOURS  TELEPHONE NUMBER    Dr. Heri Cervantes D.P.M [facility]    Clinics:  [facility]    Ekta Ahuja [facility]   Tuesday 1PM-6PM  [place]  Wednesday 7AM-2PM  [facility]  Thursday 10AM-6PM  [place]  Friday 7AM-3PM  [place]  Specialty schedulers:   [phone] to make an appointment with any Specialty Provider.        Urgent Care locations:    [facility] Monday-Friday 5 pm - 9 pm. Saturday-Sunday 9 am -5pm    Monday-Friday 11 am - 9 pm Saturday 9 am - 5 pm     Monday-Sunday 12 noon-8PM [phone]     If you need a medication refill, please contact us you may need lab work and/or a follow up visit prior to your refill (i.e. Antifungal medications).    [employer] (secure e-mail communication and access to your chart) to send a message or to make an appointment.    If MRI needed please call [facility] at [phone]        Weight management plan: Patient was referred to their PCP to discuss a diet and exercise plan.

## 2018-12-06 NOTE — LETTER
12/6/2018         RE: Mina Ames  [address]  [place] [address]        Dear Colleague,    Thank you for referring your patient, Mina Ames, to the [facility]. Please see a copy of my visit note below.    Subjective:    Patient seen as a new patient consult from [facility] [place] [facility],  and is seen today  for left ankle sprain.  Had inversion sprain 1 days ago.  Had pain and edema, but this is slowly getting better.  Seen in clinic, xrays taken, and wearing ankle brace he had from a broken foot on contralateral side last year.  Works as a hotel  and .  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  He says it is somewhat difficult for him to put weight on this.      ROS:  A 10-point review of systems was performed and is positive for that noted in the HPI and as seen above.  All other areas are negative.          Allergies   Allergen Reactions     Nkda [No Known Drug Allergies]        Current Outpatient Prescriptions   Medication Sig Dispense Refill     aspirin 81 MG tablet Take 81 mg by mouth daily       atorvastatin (LIPITOR) 20 MG tablet Take 1 tablet (20 mg) by mouth daily 90 tablet 3     Multiple Vitamin (MULTI VITAMIN  MENS) TABS        omeprazole 20 MG tablet Take 20 mg by mouth daily         Patient Active Problem List   Diagnosis     Hyperlipidemia     Adult BMI 30+     DJD (degenerative joint disease) of knee     HYPERLIPIDEMIA LDL GOAL <160       Past Medical History:   Diagnosis Date     Adult BMI 30+      DJD (degenerative joint disease) of knee     xray Oct., 2009     Hyperlipidemia      Squamous cell skin cancer 2007    chest       Past Surgical History:   Procedure Laterality Date     C APPENDECTOMY  1971     COLONOSCOPY  2016     SURGICAL HISTORY OF -   1995    wrist       Family History   Problem Relation Age of Onset     Hypertension Mother      Heart Disease Father      Hypertension Father       Lipids Father      Depression Father      Coronary Artery Disease Father      Bypass surgery - 3        Social History   Substance Use Topics     Smoking status: Never Smoker     Smokeless tobacco: Never Used     Alcohol use No         Exam:    Vitals: Pulse 86  Wt 210 lb (95.3 kg)  BMI 30.57 kg/m2  BMI: Body mass index is 30.57 kg/(m^2).  Height: Data Unavailable    Constitutional/ general:  Pt is in no apparent distress, appears well-nourished.  Cooperative with history and physical exam.  Patient seen with his wife    Psych:  The patient answered questions appropriately.  Normal affect.  Seems to have reasonable expectations, in terms of treatment.     Eyes:  Visual scanning/ tracking without deficit.     Ears:  Response to auditory stimuli is normal.  No hearing aid devices.  Auricles in proper alignment.     Lymphatic:  Popliteal lymph nodes not enlarged.     Lungs:  Non labored breathing, non labored speech. No cough.  No audible wheezing. Even, quiet breathing.       Vascular:  positive pedal pulses bilaterally for both the DP and PT arteries.  CFT < 3 sec.  negative ankle edema.  positive pedal hair growth.    Neuro:  Alert and oriented x 3. Coordinated gait.  Light touch sensation is intact to the L4, L5, S1 distributions. No obvious deficits.  No evidence of neurological-based weakness, spasticity, or contracture in the lower extremities.      Derm: Normal texture and turgor.  No erythema, ecchymosis, or cyanosis.      Musculoskeletal:    Lower extremity muscle strength is normal.   No gross deformities.   Normal arch with weight bearing.  Muscle strength 5/5 in all compartments.  No pain with stressing any tendons.  Normal ROM all forefoot and rearfoot joints.  Anterior drawer equal and symmetrical bilateral.  Inversion equal and symmetrical bilateral.         negative ecchymosis  negative erythema  negative pain at TMTJs or styloid process.  negative Achilles or calcaneal tubercle pain  negative medial  ankle pain  negative pain AITF ligament  positive pain over ATFL/CFL, slight edema here  negative pain with stressing or palpation peroneal tendons  negative peroneal subluxation  negative pain over medial or lateral malleoli  Pain dorsal central fourth metatarsal and fourth interspace.  With loading met heads no pain.  Slight edema here.    Radiographic Exam:  X-Ray Findings: Left foot x-rays unremarkable.  Ankle x-rays from [place] show no signs of bone fracture.    Assessment:  Left  ankle sprain       Plan:  X-rays personally reviewed from [place] ED.  X-rays taken of left foot today.  Discussed with patient there is no signs of any bone break or tendon pathology.  Discussed mechanism of injuries.  He has an ankle high Cam walker.  He will continue with this and I discussed that he can walk in this as tolerated.  Discussed RICE.  He believes he has a ankle brace.  When feeling better he will graduate into this.  We will have him return to clinic in 2 weeks for reevaluation.  25 minutes spent in total time taking care of this patient with half of this time face-to-face conversation.      Heri Cervantes DPM, FACFAS        Again, thank you for allowing me to participate in the care of your patient.        Sincerely,        Heri Cervantes DPM

## 2018-12-06 NOTE — LETTER
[address]  [place] [address]  Phone: [phone]    December 6, 2018        Mina Ames  [address] [place] [address]          To whom it may concern:    RE: Mina Ames    Patient was seen and treated today at our clinic and missed work.  Patient may return to work  with the following:  SEATED WORK ONLY FOR 2 WEEKS.  12/06/18-12/20/18      Please contact me for questions or concerns.      Sincerely,        Dr. Heri PAULPCARY FAC FAS/LLD

## 2018-12-20 ENCOUNTER — OFFICE VISIT (OUTPATIENT)
Dept: PODIATRY | Facility: CLINIC | Age: 63
End: 2018-12-20
Payer: OTHER MISCELLANEOUS

## 2018-12-20 VITALS
DIASTOLIC BLOOD PRESSURE: 78 MMHG | BODY MASS INDEX: 30.57 KG/M2 | HEART RATE: 100 BPM | WEIGHT: 210 LBS | SYSTOLIC BLOOD PRESSURE: 142 MMHG

## 2018-12-20 DIAGNOSIS — S93.602A SPRAIN OF LEFT FOOT, INITIAL ENCOUNTER: Primary | ICD-10-CM

## 2018-12-20 PROCEDURE — 99213 OFFICE O/P EST LOW 20 MIN: CPT | Performed by: PODIATRIST

## 2018-12-20 NOTE — PROGRESS NOTES
Subjective:    12/6/18  Patient seen as a new patient consult from [facility],  and is seen today  for left ankle sprain.  Had inversion sprain 1 days ago.  Had pain and edema, but this is slowly getting better.  Seen in clinic, xrays taken, and wearing ankle brace he had from a broken foot on contralateral side last year.  Works as a hotel  and .  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  He says it is somewhat difficult for him to put weight on this.    12/20/18 patient doing much better.  Walking in a regular shoe now with no problems.  Just slight discomfort around his ankle somewhat medial but really he is feeling significantly improved.  Denies erythema or ecchymosis.  Denies weakness      ROS: See above       Allergies   Allergen Reactions     Nkda [No Known Drug Allergies]        Current Outpatient Medications   Medication Sig Dispense Refill     aspirin 81 MG tablet Take 81 mg by mouth daily       atorvastatin (LIPITOR) 20 MG tablet Take 1 tablet (20 mg) by mouth daily 90 tablet 3     Multiple Vitamin (MULTI VITAMIN  MENS) TABS        omeprazole 20 MG tablet Take 20 mg by mouth daily         Patient Active Problem List   Diagnosis     Hyperlipidemia     Adult BMI 30+     DJD (degenerative joint disease) of knee     HYPERLIPIDEMIA LDL GOAL <160       Past Medical History:   Diagnosis Date     Adult BMI 30+      DJD (degenerative joint disease) of knee     xray Oct., 2009     Hyperlipidemia      Squamous cell skin cancer 2007    chest       Past Surgical History:   Procedure Laterality Date     C APPENDECTOMY  1971     COLONOSCOPY  2016     SURGICAL HISTORY OF -   1995    wrist       Family History   Problem Relation Age of Onset     Hypertension Mother      Heart Disease Father      Hypertension Father      Lipids Father      Depression Father      Coronary Artery Disease Father         Bypass surgery - 3        Social History      Tobacco Use     Smoking status: Never Smoker     Smokeless tobacco: Never Used   Substance Use Topics     Alcohol use: No         Exam:    Vitals: /78 (BP Location: Right arm, Patient Position: Sitting)   Pulse 100   Wt 95.3 kg (210 lb)   BMI 30.57 kg/m    BMI: Body mass index is 30.57 kg/m .  Height: Data Unavailable    Constitutional/ general:  Pt is in no apparent distress, appears well-nourished.  Cooperative with history and physical exam.  Patient seen with his wife    Psych:  The patient answered questions appropriately.  Normal affect.  Seems to have reasonable expectations, in terms of treatment.     Eyes:  Visual scanning/ tracking without deficit.     Ears:  Response to auditory stimuli is normal.  No hearing aid devices.  Auricles in proper alignment.     Lymphatic:  Popliteal lymph nodes not enlarged.     Lungs:  Non labored breathing, non labored speech. No cough.  No audible wheezing. Even, quiet breathing.       Vascular:  positive pedal pulses bilaterally for both the DP and PT arteries.  CFT < 3 sec.  negative ankle edema.  positive pedal hair growth.    Neuro:  Alert and oriented x 3. Coordinated gait.  Light touch sensation is intact to the L4, L5, S1 distributions. No obvious deficits.  No evidence of neurological-based weakness, spasticity, or contracture in the lower extremities.      Derm: Normal texture and turgor.  No erythema, ecchymosis, or cyanosis.      Musculoskeletal:    Lower extremity muscle strength is normal.   No gross deformities.   Normal arch with weight bearing.  Muscle strength 5/5 in all compartments.  No pain with stressing any tendons.  Normal ROM all forefoot and rearfoot joints.  Anterior drawer equal and symmetrical bilateral.  Inversion equal and symmetrical bilateral.         negative ecchymosis  negative erythema  negative pain at TMTJs or styloid process.  negative Achilles or calcaneal tubercle pain  negative medial ankle pain  negative pain AITF  ligament  Negative pain over ATFL/CFL, slight edema here  negative pain with stressing or palpation peroneal tendons  negative peroneal subluxation  negative pain over medial or lateral malleoli  No pain now on fourth metatarsal or anywhere    Radiographic Exam:  X-Ray Findings: Left foot x-rays unremarkable.  Ankle x-rays from [place] show no signs of bone fracture.    Assessment:  Left  ankle sprain       Plan: Patient now significantly better.  He may return to work without restrictions.  He will keep an Ace bandage around his foot to help with stability as needed.  RTC as needed      Heri SWEETM, FACFAS

## 2018-12-20 NOTE — PATIENT INSTRUCTIONS
We wish you continued good healing. If you have any questions or concerns, please do not hesitate to contact us at [phone]    Please remember to call and schedule a follow up appointment if one was recommended at your earliest convenience.   PODIATRY CLINIC HOURS  TELEPHONE NUMBER    Dr. Heri Cervantes D.P.M [facility]    Clinics:  [facility]    Ekta Ahuja [facility]   Tuesday 1PM-6PM  [place]  Wednesday 7AM-2PM  [facility]  Thursday 10AM-6PM  [place]  Friday 7AM-3PM  [place]  Specialty schedulers:   [phone] to make an appointment with any Specialty Provider.        Urgent Care locations:    [facility] Monday-Friday 5 pm - 9 pm. Saturday-Sunday 9 am -5pm    Monday-Friday 11 am - 9 pm Saturday 9 am - 5 pm     Monday-Sunday 12 noon-8PM [phone]     If you need a medication refill, please contact us you may need lab work and/or a follow up visit prior to your refill (i.e. Antifungal medications).    [employer] (secure e-mail communication and access to your chart) to send a message or to make an appointment.    If MRI needed please call [place] [facility] at [phone]        Weight management plan: Patient was referred to their PCP to discuss a diet and exercise plan.    Patient to follow up with Primary Care provider regarding elevated blood pressure.

## 2018-12-20 NOTE — LETTER
12/20/2018         RE: Mina Ames  [address]  [place] [facility] [address]        Dear Colleague,    Thank you for referring your patient, Mina Ames, to the [facility]. Please see a copy of my visit note below.    Subjective:    12/6/18  Patient seen as a new patient consult from [facility] [place] LASHA MERRILL,  and is seen today  for left ankle sprain.  Had inversion sprain 1 days ago.  Had pain and edema, but this is slowly getting better.  Seen in clinic, xrays taken, and wearing ankle brace he had from a broken foot on contralateral side last year.  Works as a hotel  and .  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  He says it is somewhat difficult for him to put weight on this.    12/20/18 patient doing much better.  Walking in a regular shoe now with no problems.  Just slight discomfort around his ankle somewhat medial but really he is feeling significantly improved.  Denies erythema or ecchymosis.  Denies weakness      ROS: See above       Allergies   Allergen Reactions     Nkda [No Known Drug Allergies]        Current Outpatient Medications   Medication Sig Dispense Refill     aspirin 81 MG tablet Take 81 mg by mouth daily       atorvastatin (LIPITOR) 20 MG tablet Take 1 tablet (20 mg) by mouth daily 90 tablet 3     Multiple Vitamin (MULTI VITAMIN  MENS) TABS        omeprazole 20 MG tablet Take 20 mg by mouth daily         Patient Active Problem List   Diagnosis     Hyperlipidemia     Adult BMI 30+     DJD (degenerative joint disease) of knee     HYPERLIPIDEMIA LDL GOAL <160       Past Medical History:   Diagnosis Date     Adult BMI 30+      DJD (degenerative joint disease) of knee     xray Oct., 2009     Hyperlipidemia      Squamous cell skin cancer 2007    chest       Past Surgical History:   Procedure Laterality Date     C APPENDECTOMY  1971     COLONOSCOPY  2016     SURGICAL HISTORY OF -   1995    wrist       Family  History   Problem Relation Age of Onset     Hypertension Mother      Heart Disease Father      Hypertension Father      Lipids Father      Depression Father      Coronary Artery Disease Father         Bypass surgery - 3        Social History     Tobacco Use     Smoking status: Never Smoker     Smokeless tobacco: Never Used   Substance Use Topics     Alcohol use: No         Exam:    Vitals: /78 (BP Location: Right arm, Patient Position: Sitting)   Pulse 100   Wt 95.3 kg (210 lb)   BMI 30.57 kg/m     BMI: Body mass index is 30.57 kg/m .  Height: Data Unavailable    Constitutional/ general:  Pt is in no apparent distress, appears well-nourished.  Cooperative with history and physical exam.  Patient seen with his wife    Psych:  The patient answered questions appropriately.  Normal affect.  Seems to have reasonable expectations, in terms of treatment.     Eyes:  Visual scanning/ tracking without deficit.     Ears:  Response to auditory stimuli is normal.  No hearing aid devices.  Auricles in proper alignment.     Lymphatic:  Popliteal lymph nodes not enlarged.     Lungs:  Non labored breathing, non labored speech. No cough.  No audible wheezing. Even, quiet breathing.       Vascular:  positive pedal pulses bilaterally for both the DP and PT arteries.  CFT < 3 sec.  negative ankle edema.  positive pedal hair growth.    Neuro:  Alert and oriented x 3. Coordinated gait.  Light touch sensation is intact to the L4, L5, S1 distributions. No obvious deficits.  No evidence of neurological-based weakness, spasticity, or contracture in the lower extremities.      Derm: Normal texture and turgor.  No erythema, ecchymosis, or cyanosis.      Musculoskeletal:    Lower extremity muscle strength is normal.   No gross deformities.   Normal arch with weight bearing.  Muscle strength 5/5 in all compartments.  No pain with stressing any tendons.  Normal ROM all forefoot and rearfoot joints.  Anterior drawer equal and symmetrical  bilateral.  Inversion equal and symmetrical bilateral.         negative ecchymosis  negative erythema  negative pain at TMTJs or styloid process.  negative Achilles or calcaneal tubercle pain  negative medial ankle pain  negative pain AITF ligament  Negative pain over ATFL/CFL, slight edema here  negative pain with stressing or palpation peroneal tendons  negative peroneal subluxation  negative pain over medial or lateral malleoli  No pain now on fourth metatarsal or anywhere    Radiographic Exam:  X-Ray Findings: Left foot x-rays unremarkable.  Ankle x-rays from [place] show no signs of bone fracture.    Assessment:  Left  ankle sprain       Plan: Patient now significantly better.  He may return to work without restrictions.  He will keep an Ace bandage around his foot to help with stability as needed.  RTC as needed      Heri Cervantes DPM, FACFAS        Again, thank you for allowing me to participate in the care of your patient.        Sincerely,        Heri Cervantes DPM

## 2018-12-21 ENCOUNTER — TELEPHONE (OUTPATIENT)
Dept: PODIATRY | Facility: CLINIC | Age: 63
End: 2018-12-21

## 2018-12-21 NOTE — TELEPHONE ENCOUNTER
Spoke with Pt who needs a work note Ok to RTW.  Letter completed and emailed to pt  Ekta Ahuja CMA

## 2018-12-21 NOTE — TELEPHONE ENCOUNTER
Reason for call:  Other   Patient called regarding (reason for call): call back  Additional comments: Patient would like a letter stating he can return to work effective 12/26/2018. Please follow up with patient any questions.    Phone number to reach patient:  Home number on file [phone] (home)    Best Time:  ASAP    Can we leave a detailed message on this number?  YES

## 2018-12-21 NOTE — LETTER
[address] [address]  Phone: [phone]    December 21, 2018        Mina CLAYTON Ames  [address] [facility] [address]          To whom it may concern:    RE: Mina CLAYTON OrrAmes    Patient was seen and treated at our clinic and missed work.    Patient may return to work 12/26/18 with the following:  No working or lifting restrictions    Please contact me for questions or concerns.      Sincerely,        Dr. Heri Cervantes DSteveP.PAT FAC FAS/lld

## 2019-01-10 ENCOUNTER — ANCILLARY PROCEDURE (OUTPATIENT)
Dept: GENERAL RADIOLOGY | Facility: CLINIC | Age: 64
End: 2019-01-10
Payer: OTHER MISCELLANEOUS

## 2019-01-10 ENCOUNTER — OFFICE VISIT (OUTPATIENT)
Dept: ORTHOPEDICS | Facility: CLINIC | Age: 64
End: 2019-01-10
Payer: OTHER MISCELLANEOUS

## 2019-01-10 VITALS
BODY MASS INDEX: 30.06 KG/M2 | WEIGHT: 210 LBS | HEIGHT: 70 IN | SYSTOLIC BLOOD PRESSURE: 126 MMHG | DIASTOLIC BLOOD PRESSURE: 82 MMHG

## 2019-01-10 DIAGNOSIS — M25.561 RIGHT KNEE PAIN: ICD-10-CM

## 2019-01-10 DIAGNOSIS — S82.034A CLOSED NONDISPLACED TRANSVERSE FRACTURE OF RIGHT PATELLA, INITIAL ENCOUNTER: Primary | ICD-10-CM

## 2019-01-10 LAB — RADIOLOGIST FLAGS: ABNORMAL

## 2019-01-10 PROCEDURE — 73562 X-RAY EXAM OF KNEE 3: CPT

## 2019-01-10 PROCEDURE — 99204 OFFICE O/P NEW MOD 45 MIN: CPT | Mod: 57 | Performed by: PEDIATRICS

## 2019-01-10 PROCEDURE — 27520 TREAT KNEECAP FRACTURE: CPT | Mod: RT | Performed by: PEDIATRICS

## 2019-01-10 ASSESSMENT — MIFFLIN-ST. JEOR: SCORE: 1750.86

## 2019-01-10 NOTE — LETTER
January 10, 2019      Mina Ames  [address] [facility] [address]        To Whom It May Concern,     Mina is under my care for a right knee injury.  He should remain off work at this time.      Follow up will be in 1-2 weeks      Sincerely,        Miroslava Parks MD

## 2019-01-10 NOTE — LETTER
1/10/2019         RE: Mina Ames  [address]  [place] [address]        Dear Colleague,    Thank you for referring your patient, Mina Ames, to the [facility] [place]. Please see a copy of my visit note below.    Sports Medicine Clinic Visit    PCP: Escobar Curry    Mina Ames is a 63 year old male who is seen  as a self referral presenting with right knee injury.    Injury: He reports right knee pain following a fall at work on 1/9/19 while at work. He reports pain is in the anterior knee and posterior calf. He reports pain increases with weight bearing and walking. He present to the clinic today with crutches.    Location of Pain: right knee  Duration of Pain: 1 day(s)  Rating of Pain at worst: 8/10  Rating of Pain Currently: 5/10  Symptoms are better with: Rest  Symptoms are worse with: extension, flexion and weight bearing  Additional Features:   Positive: swelling, bruising, instability and weakness   Negative: popping, grinding, catching, locking, paresthesias and numbness  Other evaluation and/or treatments so far consists of: Ice, Ibuprofen and Rest  Prior History of related problems: nothing    Social History:  at a Hotel    Review of Systems  Skin: mild bruising, yes swelling  Musculoskeletal: as above  Neurologic: no numbness, paresthesias  Remainder of review of systems is negative including constitutional, CV, pulmonary, GI, except as noted in HPI or medical history.    Patient's current problem list, past medical and surgical history, and family history were reviewed.    Patient Active Problem List   Diagnosis     Hyperlipidemia     Adult BMI 30+     DJD (degenerative joint disease) of knee     HYPERLIPIDEMIA LDL GOAL <160     Past Medical History:   Diagnosis Date     Adult BMI 30+      DJD (degenerative joint disease) of knee     xray Oct., 2009     Hyperlipidemia      Squamous cell skin cancer 2007    chest     Past Surgical  "History:   Procedure Laterality Date     C APPENDECTOMY  1971     COLONOSCOPY  2016     SURGICAL HISTORY OF -   1995    wrist     Family History   Problem Relation Age of Onset     Hypertension Mother      Heart Disease Father      Hypertension Father      Lipids Father      Depression Father      Coronary Artery Disease Father         Bypass surgery - 3          Objective  /82 (BP Location: Left arm, Patient Position: Chair, Cuff Size: Adult Regular)   Ht 1.765 m (5' 9.5\")   Wt 95.3 kg (210 lb)   BMI 30.57 kg/m       GENERAL APPEARANCE: healthy, alert and no distress   GAIT: antalgic  SKIN: no suspicious lesions or rashes  HEENT: Sclera clear, anicteric  CV: good peripheral pulses  RESP: Breathing not labored  NEURO: Normal strength and tone, mentation intact and speech normal  PSYCH:  mentation appears normal and affect normal/bright    Bilateral Knee exam    Inspection:      moderate effusion right       mild swelling right       Well healed anterior right knee abrasion    Patella:      Mobility -       hypomobile right    Tender:      Anterior patella and with compression    Non Tender:      remainder of knee area right    Knee ROM:      Flexion 80 degrees right       Extension 10 degrees right    Strength:      SLR intact    Special Tests:     *difficult special testing due to     Gait:      antalgic gait    Lower Extremity Alignment:      Normal    Neurovascular:      2+ peripheral pulses bilaterally and brisk capillary refill       sensation grossly intact    Radiology  I ordered, visualized and reviewed these images with the patient  Xr Knee Standing Ap Bilat [place] Bilat Lat Right  Result Date: 1/10/2019  RIGHT  KNEE STANDING AP BILATERAL [place] BILATERAL LATERAL  1/10/2019 1:46 PM HISTORY: Right knee pain.   IMPRESSION: 1. Possible right patellar inferior pole nondisplaced fracture, age indeterminate. 2. Right knee moderate and left knee severe medial compartment joint space narrowing. Right " knee patellofemoral lateral facet severe joint space narrowing is also noted. 3. Right knee nonspecific joint effusion. [Access Center: Possible patellar fracture] This report will be copied to the [facility] to ensure a provider acknowledges the finding. Access Center is available Monday through Friday 8am-3:30 pm. MINH LEVY MD    Assessment:  1. Closed nondisplaced transverse fracture of right patella, initial encounter      Right patella fracture with intact SLR.  Discussed typical treatment including immobilization initially to help fracture heal and eventual physical therapy to improve mobility.  Discussed other supportive care including rest, ice, elevation, compression, knee immobilizer and crutches.    Plan:  - Today's Plan of Care:  Medical Equipment: knee immobilizer  Crutches if needed  Continue with relative rest and activity modification, Ice, Compression, and Elevation.  Can apply ice 10-15 minutes  3-4 times per day as needed.  Letter for work provided    Follow Up: 1-2 weeks with 2 view of right knee only    Concerning signs and symptoms were reviewed.  The patient expressed understanding of this management plan and all questions were answered at this time.    Miroslava Parks MD CAQ  Primary Care Sports Medicine  [facility]    Again, thank you for allowing me to participate in the care of your patient.        Sincerely,        Miroslava Parks MD

## 2019-01-10 NOTE — PROGRESS NOTES
Sports Medicine Clinic Visit    PCP: Escobar Curry CLAYTON Ames is a 63 year old male who is seen  as a self referral presenting with right knee injury.    Injury: He reports right knee pain following a fall at work on 1/9/19 while at work. He reports pain is in the anterior knee and posterior calf. He reports pain increases with weight bearing and walking. He present to the clinic today with crutches.    Location of Pain: right knee  Duration of Pain: 1 day(s)  Rating of Pain at worst: 8/10  Rating of Pain Currently: 5/10  Symptoms are better with: Rest  Symptoms are worse with: extension, flexion and weight bearing  Additional Features:   Positive: swelling, bruising, instability and weakness   Negative: popping, grinding, catching, locking, paresthesias and numbness  Other evaluation and/or treatments so far consists of: Ice, Ibuprofen and Rest  Prior History of related problems: nothing    Social History:  at a Hotel    Review of Systems  Skin: mild bruising, yes swelling  Musculoskeletal: as above  Neurologic: no numbness, paresthesias  Remainder of review of systems is negative including constitutional, CV, pulmonary, GI, except as noted in HPI or medical history.    Patient's current problem list, past medical and surgical history, and family history were reviewed.    Patient Active Problem List   Diagnosis     Hyperlipidemia     Adult BMI 30+     DJD (degenerative joint disease) of knee     HYPERLIPIDEMIA LDL GOAL <160     Past Medical History:   Diagnosis Date     Adult BMI 30+      DJD (degenerative joint disease) of knee     xray Oct., 2009     Hyperlipidemia      Squamous cell skin cancer 2007    chest     Past Surgical History:   Procedure Laterality Date     C APPENDECTOMY  1971     COLONOSCOPY  2016     SURGICAL HISTORY OF -   1995    wrist     Family History   Problem Relation Age of Onset     Hypertension Mother      Heart Disease Father      Hypertension Father      Lipids  "Father      Depression Father      Coronary Artery Disease Father         Bypass surgery - 3          Objective  /82 (BP Location: Left arm, Patient Position: Chair, Cuff Size: Adult Regular)   Ht 1.765 m (5' 9.5\")   Wt 95.3 kg (210 lb)   BMI 30.57 kg/m      GENERAL APPEARANCE: healthy, alert and no distress   GAIT: antalgic  SKIN: no suspicious lesions or rashes  HEENT: Sclera clear, anicteric  CV: good peripheral pulses  RESP: Breathing not labored  NEURO: Normal strength and tone, mentation intact and speech normal  PSYCH:  mentation appears normal and affect normal/bright    Bilateral Knee exam    Inspection:      moderate effusion right       mild swelling right       Well healed anterior right knee abrasion    Patella:      Mobility -       hypomobile right    Tender:      Anterior patella and with compression    Non Tender:      remainder of knee area right    Knee ROM:      Flexion 80 degrees right       Extension 10 degrees right    Strength:      SLR intact    Special Tests:     *difficult special testing due to     Gait:      antalgic gait    Lower Extremity Alignment:      Normal    Neurovascular:      2+ peripheral pulses bilaterally and brisk capillary refill       sensation grossly intact    Radiology  I ordered, visualized and reviewed these images with the patient  Xr Knee Standing Ap Bilat [place] Bilat Lat Right  Result Date: 1/10/2019  RIGHT  KNEE STANDING AP BILATERAL [place] BILATERAL LATERAL  1/10/2019 1:46 PM HISTORY: Right knee pain.   IMPRESSION: 1. Possible right patellar inferior pole nondisplaced fracture, age indeterminate. 2. Right knee moderate and left knee severe medial compartment joint space narrowing. Right knee patellofemoral lateral facet severe joint space narrowing is also noted. 3. Right knee nonspecific joint effusion. [Access Center: Possible patellar fracture] This report will be copied to the [place] [facility] to ensure a provider acknowledges the finding. " Access Center is available Monday through Friday 8am-3:30 pm. MINH LEVY MD    Assessment:  1. Closed nondisplaced transverse fracture of right patella, initial encounter      Right patella fracture with intact SLR.  Discussed typical treatment including immobilization initially to help fracture heal and eventual physical therapy to improve mobility.  Discussed other supportive care including rest, ice, elevation, compression, knee immobilizer and crutches.    Plan:  - Today's Plan of Care:  Medical Equipment: knee immobilizer  Crutches if needed  Continue with relative rest and activity modification, Ice, Compression, and Elevation.  Can apply ice 10-15 minutes  3-4 times per day as needed.  Letter for work provided    Follow Up: 1-2 weeks with 2 view of right knee only    Concerning signs and symptoms were reviewed.  The patient expressed understanding of this management plan and all questions were answered at this time.    Miroslava Parks MD CAQ  Primary Care Sports Medicine  [facility]

## 2019-01-10 NOTE — PATIENT INSTRUCTIONS
Plan:  - Today's Plan of Care:  Medical Equipment: knee immobilizer  Crutches if needed  Continue with relative rest and activity modification, Ice, Compression, and Elevation.  Can apply ice 10-15 minutes  3-4 times per day as needed.  Letter for work provided    Follow Up: 1-2 weeks with 2 view of right knee only    If you have any further questions for your physician or physician s care team you can call [phone] and use option 3 to leave a voice message. Calls received during business hours will be returned same day.

## 2019-01-21 ENCOUNTER — TELEPHONE (OUTPATIENT)
Dept: ORTHOPEDICS | Facility: CLINIC | Age: 64
End: 2019-01-21

## 2019-01-21 ENCOUNTER — ANCILLARY PROCEDURE (OUTPATIENT)
Dept: GENERAL RADIOLOGY | Facility: CLINIC | Age: 64
End: 2019-01-21
Payer: OTHER MISCELLANEOUS

## 2019-01-21 ENCOUNTER — OFFICE VISIT (OUTPATIENT)
Dept: ORTHOPEDICS | Facility: CLINIC | Age: 64
End: 2019-01-21
Payer: OTHER MISCELLANEOUS

## 2019-01-21 ENCOUNTER — ANCILLARY PROCEDURE (OUTPATIENT)
Dept: ULTRASOUND IMAGING | Facility: CLINIC | Age: 64
End: 2019-01-21
Payer: OTHER MISCELLANEOUS

## 2019-01-21 VITALS
BODY MASS INDEX: 30.06 KG/M2 | HEIGHT: 70 IN | SYSTOLIC BLOOD PRESSURE: 130 MMHG | WEIGHT: 210 LBS | DIASTOLIC BLOOD PRESSURE: 80 MMHG

## 2019-01-21 DIAGNOSIS — S82.034D CLOSED NONDISPLACED TRANSVERSE FRACTURE OF RIGHT PATELLA WITH ROUTINE HEALING, SUBSEQUENT ENCOUNTER: ICD-10-CM

## 2019-01-21 DIAGNOSIS — S82.034D CLOSED NONDISPLACED TRANSVERSE FRACTURE OF RIGHT PATELLA WITH ROUTINE HEALING, SUBSEQUENT ENCOUNTER: Primary | ICD-10-CM

## 2019-01-21 DIAGNOSIS — M79.89 RIGHT LEG SWELLING: ICD-10-CM

## 2019-01-21 PROCEDURE — 99213 OFFICE O/P EST LOW 20 MIN: CPT | Mod: 24 | Performed by: PEDIATRICS

## 2019-01-21 PROCEDURE — 93971 EXTREMITY STUDY: CPT | Mod: RT

## 2019-01-21 PROCEDURE — 99207 ZZC FRACTURE CARE IN GLOBAL PERIOD: CPT | Performed by: PEDIATRICS

## 2019-01-21 PROCEDURE — 73590 X-RAY EXAM OF LOWER LEG: CPT | Mod: RT

## 2019-01-21 ASSESSMENT — MIFFLIN-ST. JEOR: SCORE: 1750.86

## 2019-01-21 NOTE — LETTER
1/21/2019         RE: Mina Ames  [address]  [facility] [address]        Dear Colleague,    Thank you for referring your patient, Mina Ames, to the [facility] [place]. Please see a copy of my visit note below.    Sports Medicine Clinic Visit - Interim History January 21, 2019      PCP: Escobar Curry    Mina Ames is a 63 year old male who is seen in f/u up for    Closed nondisplaced transverse fracture of right patella with routine healing, subsequent encounter  Right leg swelling. Since last visit on 1/10/19, patient has reports of soreness in the anterior knee and swollen. He has been using the knee immobilizer regularly and states the knee feels stiff at times. He is not currently using crutches. He has not returned to work at this time.  Does have pain into right tibia and calf.  Thinks he fell on the whole right leg at the time of his injury.  - Now ~ 1.5 weeks from initial injury    Social History:  at a hotel    Review of Systems  Skin: yes bruising, yes swelling  Musculoskeletal: as above  Neurologic: no numbness, paresthesias  Remainder of review of systems is negative including constitutional, CV, pulmonary, GI, except as noted in HPI or medical history.    Patient's current problem list, past medical and surgical history, and family history were reviewed.    Patient Active Problem List   Diagnosis     Hyperlipidemia     Adult BMI 30+     DJD (degenerative joint disease) of knee     HYPERLIPIDEMIA LDL GOAL <160     Past Medical History:   Diagnosis Date     Adult BMI 30+      DJD (degenerative joint disease) of knee     xray Oct., 2009     Hyperlipidemia      Squamous cell skin cancer 2007    chest     Past Surgical History:   Procedure Laterality Date     C APPENDECTOMY  1971     COLONOSCOPY  2016     SURGICAL HISTORY OF -   1995    wrist     Family History   Problem Relation Age of Onset     Hypertension Mother      Heart Disease  "Father      Hypertension Father      Lipids Father      Depression Father      Coronary Artery Disease Father         Bypass surgery - 3        Objective  /80 (BP Location: Left arm, Patient Position: Chair, Cuff Size: Adult Regular)   Ht 1.765 m (5' 9.5\")   Wt 95.3 kg (210 lb)   BMI 30.57 kg/m       GENERAL APPEARANCE: healthy, alert and no distress   GAIT: antalgic  SKIN: no suspicious lesions or rashes  HEENT: Sclera clear, anicteric  CV: good peripheral pulses  RESP: Breathing not labored  NEURO: Normal strength and tone, mentation intact and speech normal  PSYCH:  mentation appears normal and affect normal/bright    Bilateral Knee exam  Inspection:      moderate effusion right - slightly improved       mild swelling right - knee and into right leg       Well healed anterior right knee abrasion       Bruising extending into leg including tibia and calf     Patella:      Mobility -       hypomobile right     Tender:      Anterior patella and with compression  - throughout anterior tibia     Non Tender:      remainder of knee area right     Knee ROM:      Flexion 80 degrees right       Extension 10 degrees right     Strength:      SLR intact     Special Tests:     *difficult special testing due to      Gait:      antalgic gait     Lower Extremity Alignment:      Normal     Neurovascular:      2+ peripheral pulses bilaterally and brisk capillary refill       sensation grossly intact       Radiology  I ordered, visualized and reviewed these images with the patient  Xr Knee Right 1/2 Views  Result Date: 1/21/2019  RIGHT KNEE ONE TO TWO VIEWS  1/21/2019 11:53 AM HISTORY: Closed nondisplaced transverse fracture of right patella with routine healing, subsequent encounter.   IMPRESSION: Patellar inferior pole nondisplaced fracture, the alignment and appearance unchanged from 1/10/2019. Patellofemoral and medial compartment degenerative joint space narrowing are noted.    Xr Tibia & Fibula Right 2 Views  Result " Date: 1/21/2019  TIBIA AND FIBULA RIGHT TWO VIEWS January 21, 2019 11:15 AM HISTORY: Evaluate fracture. Closed nondisplaced transverse fracture of right patella with routine healing, subsequent encounter.   IMPRESSION: Peroneal artery calcification. No apparent tibial/fibular fracture. Patellar inferior pole nondisplaced fracture is noted on the proximal edge of the radiograph.    Assessment:  1. Closed nondisplaced transverse fracture of right patella with routine healing, subsequent encounter    2. Right leg swelling      Given right leg swelling and pain, will obtain US to evaluate for DVT.  For patella fracture will wean immobilizer to hinged knee brace (0-60), continue other supportive care including rest, ice, compression, crutches as needed.    Plan:  - Today's Plan of Care:  Ultrasound of the right calf  Transition from immobilizer to hinged knee brace  Continue to R.I.C.E - rest, ice, compression, elevation  Use crutches if needed    Follow Up: 2 weeks for repeat xray    Concerning signs and symptoms were reviewed.  The patient expressed understanding of this management plan and all questions were answered at this time.    Miroslava Parks MD [facility]  Primary Care Sports Medicine  [facility]    Again, thank you for allowing me to participate in the care of your patient.        Sincerely,        Miroslava Parks MD

## 2019-01-21 NOTE — LETTER
January 21, 2019      Mina Ames  [address] [facility] [address]         To Whom It May Concern,      Mina is under my care for a right knee injury.  He should remain off work at this time.        Follow up will be in 2 weeks.        Sincerely,           Miroslava Parks MD

## 2019-01-21 NOTE — PATIENT INSTRUCTIONS
Plan:  - Today's Plan of Care:  Ultrasound of the right calf  Transition from immobilizer to hinged knee brace  Continue to R.I.C.E - rest, ice, compression, elevation  Use crutches if needed      Follow Up: 2 weeks for repeat xray    If you have any further questions for your physician or physician s care team you can call [phone] and use option 3 to leave a voice message. Calls received during business hours will be returned same day.

## 2019-01-21 NOTE — PROGRESS NOTES
"Sports Medicine Clinic Visit - Interim History January 21, 2019      PCP: Escobar Curry CLAYTON Ames is a 63 year old male who is seen in f/u up for    Closed nondisplaced transverse fracture of right patella with routine healing, subsequent encounter  Right leg swelling. Since last visit on 1/10/19, patient has reports of soreness in the anterior knee and swollen. He has been using the knee immobilizer regularly and states the knee feels stiff at times. He is not currently using crutches. He has not returned to work at this time.  Does have pain into right tibia and calf.  Thinks he fell on the whole right leg at the time of his injury.  - Now ~ 1.5 weeks from initial injury    Social History:  at a hotel    Review of Systems  Skin: yes bruising, yes swelling  Musculoskeletal: as above  Neurologic: no numbness, paresthesias  Remainder of review of systems is negative including constitutional, CV, pulmonary, GI, except as noted in HPI or medical history.    Patient's current problem list, past medical and surgical history, and family history were reviewed.    Patient Active Problem List   Diagnosis     Hyperlipidemia     Adult BMI 30+     DJD (degenerative joint disease) of knee     HYPERLIPIDEMIA LDL GOAL <160     Past Medical History:   Diagnosis Date     Adult BMI 30+      DJD (degenerative joint disease) of knee     xray Oct., 2009     Hyperlipidemia      Squamous cell skin cancer 2007    chest     Past Surgical History:   Procedure Laterality Date     C APPENDECTOMY  1971     COLONOSCOPY  2016     SURGICAL HISTORY OF -   1995    wrist     Family History   Problem Relation Age of Onset     Hypertension Mother      Heart Disease Father      Hypertension Father      Lipids Father      Depression Father      Coronary Artery Disease Father         Bypass surgery - 3        Objective  /80 (BP Location: Left arm, Patient Position: Chair, Cuff Size: Adult Regular)   Ht 1.765 m (5' 9.5\") "   Wt 95.3 kg (210 lb)   BMI 30.57 kg/m      GENERAL APPEARANCE: healthy, alert and no distress   GAIT: antalgic  SKIN: no suspicious lesions or rashes  HEENT: Sclera clear, anicteric  CV: good peripheral pulses  RESP: Breathing not labored  NEURO: Normal strength and tone, mentation intact and speech normal  PSYCH:  mentation appears normal and affect normal/bright    Bilateral Knee exam  Inspection:      moderate effusion right - slightly improved       mild swelling right - knee and into right leg       Well healed anterior right knee abrasion       Bruising extending into leg including tibia and calf     Patella:      Mobility -       hypomobile right     Tender:      Anterior patella and with compression  - throughout anterior tibia     Non Tender:      remainder of knee area right     Knee ROM:      Flexion 80 degrees right       Extension 10 degrees right     Strength:      SLR intact     Special Tests:     *difficult special testing due to      Gait:      antalgic gait     Lower Extremity Alignment:      Normal     Neurovascular:      2+ peripheral pulses bilaterally and brisk capillary refill       sensation grossly intact       Radiology  I ordered, visualized and reviewed these images with the patient  Xr Knee Right 1/2 Views  Result Date: 1/21/2019  RIGHT KNEE ONE TO TWO VIEWS  1/21/2019 11:53 AM HISTORY: Closed nondisplaced transverse fracture of right patella with routine healing, subsequent encounter.   IMPRESSION: Patellar inferior pole nondisplaced fracture, the alignment and appearance unchanged from 1/10/2019. Patellofemoral and medial compartment degenerative joint space narrowing are noted.    Xr Tibia & Fibula Right 2 Views  Result Date: 1/21/2019  TIBIA AND FIBULA RIGHT TWO VIEWS January 21, 2019 11:15 AM HISTORY: Evaluate fracture. Closed nondisplaced transverse fracture of right patella with routine healing, subsequent encounter.   IMPRESSION: Peroneal artery calcification. No apparent  tibial/fibular fracture. Patellar inferior pole nondisplaced fracture is noted on the proximal edge of the radiograph.    Assessment:  1. Closed nondisplaced transverse fracture of right patella with routine healing, subsequent encounter    2. Right leg swelling      Given right leg swelling and pain, will obtain US to evaluate for DVT.  For patella fracture will wean immobilizer to hinged knee brace (0-60), continue other supportive care including rest, ice, compression, crutches as needed.    Plan:  - Today's Plan of Care:  Ultrasound of the right calf  Transition from immobilizer to hinged knee brace  Continue to R.I.C.E - rest, ice, compression, elevation  Use crutches if needed    Follow Up: 2 weeks for repeat xray    Concerning signs and symptoms were reviewed.  The patient expressed understanding of this management plan and all questions were answered at this time.    Miroslava Parks MD CAQ  Primary Care Sports Medicine  [facility]

## 2019-01-22 ENCOUNTER — TELEPHONE (OUTPATIENT)
Dept: ORTHOPEDICS | Facility: CLINIC | Age: 64
End: 2019-01-22

## 2019-01-22 NOTE — TELEPHONE ENCOUNTER
Roberta with [employer] requesting a work status note from when the patient was evaluated on 1/21/19.    Claim # [id number]  Fax# [phone]    Any questions please call # [phone]    .MARLEE Adkins)

## 2019-01-22 NOTE — TELEPHONE ENCOUNTER
Please call patient with MRI results:    VENOUS ULTRASOUND RIGHT LEG  1/21/2019 4:16 PM   HISTORY: eval DVT; Right leg swelling  COMPARISON: None.  FINDINGS:  Examination of the deep veins with graded compression and  color flow Doppler with spectral wave form analysis was performed.                                                     IMPRESSION: No evidence of deep venous thrombosis.    In Summary:  - normal US, no evidence of DVT    I Recommend:  - follow up 2 weeks as discussed    Miroslava Parks MD

## 2019-02-04 ENCOUNTER — ANCILLARY PROCEDURE (OUTPATIENT)
Dept: GENERAL RADIOLOGY | Facility: CLINIC | Age: 64
End: 2019-02-04
Payer: OTHER MISCELLANEOUS

## 2019-02-04 ENCOUNTER — OFFICE VISIT (OUTPATIENT)
Dept: ORTHOPEDICS | Facility: CLINIC | Age: 64
End: 2019-02-04
Payer: OTHER MISCELLANEOUS

## 2019-02-04 VITALS
DIASTOLIC BLOOD PRESSURE: 90 MMHG | HEIGHT: 70 IN | WEIGHT: 210 LBS | SYSTOLIC BLOOD PRESSURE: 136 MMHG | BODY MASS INDEX: 30.06 KG/M2

## 2019-02-04 DIAGNOSIS — S82.034D CLOSED NONDISPLACED TRANSVERSE FRACTURE OF RIGHT PATELLA WITH ROUTINE HEALING: Primary | ICD-10-CM

## 2019-02-04 DIAGNOSIS — S82.034D CLOSED NONDISPLACED TRANSVERSE FRACTURE OF RIGHT PATELLA WITH ROUTINE HEALING: ICD-10-CM

## 2019-02-04 PROCEDURE — 99207 ZZC FRACTURE CARE IN GLOBAL PERIOD: CPT | Performed by: PEDIATRICS

## 2019-02-04 PROCEDURE — 73560 X-RAY EXAM OF KNEE 1 OR 2: CPT | Mod: RT

## 2019-02-04 ASSESSMENT — MIFFLIN-ST. JEOR: SCORE: 1750.86

## 2019-02-04 NOTE — PATIENT INSTRUCTIONS
Plan:  - Today's Plan of Care:  Unlock hinge brace  Continue RICE  Letter for work    Follow Up: 3 weeks with repeat xray    If you have any further questions for your physician or physician s care team you can call [phone] and use option 3 to leave a voice message. Calls received during business hours will be returned same day.

## 2019-02-04 NOTE — LETTER
"    2/4/2019         RE: Mina Ames  [address]  [facility] [address]        Dear Colleague,    Thank you for referring your patient, Mina Ames, to the [facility]. Please see a copy of my visit note below.    Sports Medicine Clinic Visit - Interim History February 4, 2019      PCP: Escobar Curry    Mina Ames is a 63 year old male who is seen in f/u up for closed nondisplaced transverse fracture of right patella with routine healing. Since last visit on 1/21/19 patient has been feeling pretty good. No concerns.  - Now ~ 3.5 weeks from initial injury    Social History:  at a hotel    Review of Systems  Skin: no bruising, mild swelling  Musculoskeletal: as above  Neurologic: no numbness, paresthesias  Remainder of review of systems is negative including constitutional, CV, pulmonary, GI, except as noted in HPI or medical history.    Patient's current problem list, past medical and surgical history, and family history were reviewed.    Patient Active Problem List   Diagnosis     Hyperlipidemia     Adult BMI 30+     DJD (degenerative joint disease) of knee     HYPERLIPIDEMIA LDL GOAL <160     Past Medical History:   Diagnosis Date     Adult BMI 30+      DJD (degenerative joint disease) of knee     xray Oct., 2009     Hyperlipidemia      Squamous cell skin cancer 2007    chest     Past Surgical History:   Procedure Laterality Date     C APPENDECTOMY  1971     COLONOSCOPY  2016     SURGICAL HISTORY OF -   1995    wrist     Family History   Problem Relation Age of Onset     Hypertension Mother      Heart Disease Father      Hypertension Father      Lipids Father      Depression Father      Coronary Artery Disease Father         Bypass surgery - 3        Objective  /90 (BP Location: Right arm, Patient Position: Chair, Cuff Size: Adult Regular)   Ht 1.765 m (5' 9.5\")   Wt 95.3 kg (210 lb)   BMI 30.57 kg/m       GENERAL APPEARANCE: healthy, " alert and no distress   GAIT: NORMAL  SKIN: no suspicious lesions or rashes  HEENT: Sclera clear, anicteric  CV: good peripheral pulses  RESP: Breathing not labored  NEURO: Normal strength and tone, mentation intact and speech normal  PSYCH:  mentation appears normal and affect normal/bright    Bilateral Knee exam  Inspection:      mild knee effusion, overall improved       mild swelling righ     Patella:      Mobility -       hypomobile right     Tender:      Anterior patella and with compression     Non Tender:      remainder of knee area right     Knee ROM:      Flexion 90 degrees right       Extension 5 degrees right     Strength:      SLR intact     Special Tests:     neg (-) Jessica's, otherwise difficult special testing due to guarding     Gait:      antalgic gait     Lower Extremity Alignment:      Normal     Neurovascular:      2+ peripheral pulses bilaterally and brisk capillary refill       sensation grossly intact    Radiology  I ordered, visualized and reviewed these images with the patient  RIGHT KNEE ONE TO TWO VIEWS 2/4/2019 11:26 AM   HISTORY: Closed nondisplaced transverse fracture of right patella with  routine healing.  COMPARISON: 1/21/2019.                                                          IMPRESSION: Inferior and lateral patellar fracture which may be  comminuted, unchanged. Alignment remains near-anatomic and the  appearance is unchanged. Underlying three-compartment osteoarthritis,  most advanced at the patellofemoral joint, without change. No joint  space effusion. Vascular calcifications are noted.    Assessment:  1. Closed nondisplaced transverse fracture of right patella with routine healing      Healing fracture, will transition to unlocked hinged brace, continue supportive care.  PT likely in another 3 weeks.    Plan:  - Today's Plan of Care:  Unlock hinge brace  Continue RICE  Letter for work    Follow Up: 3 weeks with repeat xray    Concerning signs and symptoms were  reviewed.  The patient expressed understanding of this management plan and all questions were answered at this time.    Miroslava Parks MD [facility]  Primary Care Sports Medicine  [facility]    Again, thank you for allowing me to participate in the care of your patient.        Sincerely,        Miroslava Parks MD

## 2019-02-04 NOTE — LETTER
February 4, 2019      Mina Ames  [address] [facility] [address]        To Whom It May Concern,      Mina is under my care for a right knee injury.  He should remain off work at this time.        Follow up will be in 3 weeks.        Sincerely,           Miroslava Parks MD

## 2019-02-04 NOTE — PROGRESS NOTES
"Sports Medicine Clinic Visit - Interim History February 4, 2019      PCP: Escobar Curry CLAYTON Ames is a 63 year old male who is seen in f/u up for closed nondisplaced transverse fracture of right patella with routine healing. Since last visit on 1/21/19 patient has been feeling pretty good. No concerns.  - Now ~ 3.5 weeks from initial injury    Social History:  at a hotel    Review of Systems  Skin: no bruising, mild swelling  Musculoskeletal: as above  Neurologic: no numbness, paresthesias  Remainder of review of systems is negative including constitutional, CV, pulmonary, GI, except as noted in HPI or medical history.    Patient's current problem list, past medical and surgical history, and family history were reviewed.    Patient Active Problem List   Diagnosis     Hyperlipidemia     Adult BMI 30+     DJD (degenerative joint disease) of knee     HYPERLIPIDEMIA LDL GOAL <160     Past Medical History:   Diagnosis Date     Adult BMI 30+      DJD (degenerative joint disease) of knee     xray Oct., 2009     Hyperlipidemia      Squamous cell skin cancer 2007    chest     Past Surgical History:   Procedure Laterality Date     C APPENDECTOMY  1971     COLONOSCOPY  2016     SURGICAL HISTORY OF -   1995    wrist     Family History   Problem Relation Age of Onset     Hypertension Mother      Heart Disease Father      Hypertension Father      Lipids Father      Depression Father      Coronary Artery Disease Father         Bypass surgery - 3        Objective  /90 (BP Location: Right arm, Patient Position: Chair, Cuff Size: Adult Regular)   Ht 1.765 m (5' 9.5\")   Wt 95.3 kg (210 lb)   BMI 30.57 kg/m      GENERAL APPEARANCE: healthy, alert and no distress   GAIT: NORMAL  SKIN: no suspicious lesions or rashes  HEENT: Sclera clear, anicteric  CV: good peripheral pulses  RESP: Breathing not labored  NEURO: Normal strength and tone, mentation intact and speech normal  PSYCH:  mentation appears " normal and affect normal/bright    Bilateral Knee exam  Inspection:      mild knee effusion, overall improved       mild swelling righ     Patella:      Mobility -       hypomobile right     Tender:      Anterior patella and with compression     Non Tender:      remainder of knee area right     Knee ROM:      Flexion 90 degrees right       Extension 5 degrees right     Strength:      SLR intact     Special Tests:     neg (-) Jessica's, otherwise difficult special testing due to guarding     Gait:      antalgic gait     Lower Extremity Alignment:      Normal     Neurovascular:      2+ peripheral pulses bilaterally and brisk capillary refill       sensation grossly intact    Radiology  I ordered, visualized and reviewed these images with the patient  RIGHT KNEE ONE TO TWO VIEWS 2/4/2019 11:26 AM   HISTORY: Closed nondisplaced transverse fracture of right patella with  routine healing.  COMPARISON: 1/21/2019.                                                          IMPRESSION: Inferior and lateral patellar fracture which may be  comminuted, unchanged. Alignment remains near-anatomic and the  appearance is unchanged. Underlying three-compartment osteoarthritis,  most advanced at the patellofemoral joint, without change. No joint  space effusion. Vascular calcifications are noted.    Assessment:  1. Closed nondisplaced transverse fracture of right patella with routine healing      Healing fracture, will transition to unlocked hinged brace, continue supportive care.  PT likely in another 3 weeks.    Plan:  - Today's Plan of Care:  Unlock hinge brace  Continue RICE  Letter for work    Follow Up: 3 weeks with repeat xray    Concerning signs and symptoms were reviewed.  The patient expressed understanding of this management plan and all questions were answered at this time.    Miroslava Parks MD CAQ  Primary Care Sports Medicine  [facility]

## 2019-02-25 ENCOUNTER — OFFICE VISIT (OUTPATIENT)
Dept: ORTHOPEDICS | Facility: CLINIC | Age: 64
End: 2019-02-25
Payer: OTHER MISCELLANEOUS

## 2019-02-25 ENCOUNTER — ANCILLARY PROCEDURE (OUTPATIENT)
Dept: GENERAL RADIOLOGY | Facility: CLINIC | Age: 64
End: 2019-02-25
Attending: PEDIATRICS
Payer: OTHER MISCELLANEOUS

## 2019-02-25 VITALS
BODY MASS INDEX: 30.06 KG/M2 | WEIGHT: 210 LBS | SYSTOLIC BLOOD PRESSURE: 130 MMHG | HEIGHT: 70 IN | DIASTOLIC BLOOD PRESSURE: 86 MMHG

## 2019-02-25 DIAGNOSIS — S82.034D CLOSED NONDISPLACED TRANSVERSE FRACTURE OF RIGHT PATELLA WITH ROUTINE HEALING: Primary | ICD-10-CM

## 2019-02-25 DIAGNOSIS — S82.034D CLOSED NONDISPLACED TRANSVERSE FRACTURE OF RIGHT PATELLA WITH ROUTINE HEALING: ICD-10-CM

## 2019-02-25 PROCEDURE — 73560 X-RAY EXAM OF KNEE 1 OR 2: CPT | Mod: RT

## 2019-02-25 PROCEDURE — 99207 ZZC FRACTURE CARE IN GLOBAL PERIOD: CPT | Performed by: PEDIATRICS

## 2019-02-25 ASSESSMENT — MIFFLIN-ST. JEOR: SCORE: 1750.86

## 2019-02-25 NOTE — PATIENT INSTRUCTIONS
Plan:  - Today's Plan of Care:  Rehab: Physical Therapy: [facility] - [phone]  Letter for work provided    Follow Up: 2-3 weeks with no x ray    If you have any further questions for your physician or physician s care team you can call [phone] and use option 3 to leave a voice message. Calls received during business hours will be returned same day.

## 2019-02-25 NOTE — LETTER
February 25, 2019      Mina Ames  [address] [facility] [address]        To Whom It May Concern,      Mina is under my care for a right knee injury.  He should remain off work at this time.    Start PT and follow up will be in 2-3 weeks.        Sincerely,           Miroslava Parks MD

## 2019-02-25 NOTE — LETTER
2/25/2019         RE: Mina Ames  [address]  [place] [facility] [address]        Dear Colleague,    Thank you for referring your patient, Mina Ames, to the [facility]. Please see a copy of my visit note below.      Sports Medicine Clinic Visit - Interim History February 25, 2019      PCP: Escobar Curry    Mina Ames is a 63 year old male who is seen in f/u up for Closed nondisplaced transverse fracture of right patella with routine healing. Since last visit on 2/4/19, patient has reports of no pain with any activity and has been walking better.  He reports he continues to use the knee brace. 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  - Now ~ 6.5 weeks from initial injury    Social History:  at a [employer]    Review of Systems  Skin: no bruising, mild swelling  Musculoskeletal: as above  Neurologic: no numbness, paresthesias  Remainder of review of systems is negative including constitutional, CV, pulmonary, GI, except as noted in HPI or medical history.    Patient's current problem list, past medical and surgical history, and family history were reviewed.    Patient Active Problem List   Diagnosis     Hyperlipidemia     Adult BMI 30+     DJD (degenerative joint disease) of knee     HYPERLIPIDEMIA LDL GOAL <160     Past Medical History:   Diagnosis Date     Adult BMI 30+      DJD (degenerative joint disease) of knee     xray Oct., 2009     Hyperlipidemia      Squamous cell skin cancer 2007    chest     Past Surgical History:   Procedure Laterality Date     C APPENDECTOMY  1971     COLONOSCOPY  2016     SURGICAL HISTORY OF -   1995    wrist     Family History   Problem Relation Age of Onset     Hypertension Mother      Heart Disease Father      Hypertension Father      Lipids Father      Depression Father      Coronary Artery Disease Father         Bypass surgery - 3        Objective  /86 (BP Location: Left arm, Patient Position: Chair, Cuff  "Size: Adult Regular)   Ht 1.765 m (5' 9.5\")   Wt 95.3 kg (210 lb)   BMI 30.57 kg/m       GENERAL APPEARANCE: healthy, alert and no distress   GAIT: NORMAL  SKIN: no suspicious lesions or rashes  HEENT: Sclera clear, anicteric  CV: good peripheral pulses  RESP: Breathing not labored  NEURO: Normal strength and tone, mentation intact and speech normal  PSYCH:  mentation appears normal and affect normal/bright    Bilateral Knee exam  Inspection:             mild swelling right     Patella:      Mobility -       hypomobile right     Tender:      Anterior patella and with compression (mild)     Non Tender:      remainder of knee area right     Knee ROM:      Flexion 120 degrees right       Extension 5 degrees right  (similar to left     Strength:      SLR intact, 5/5 strength with knee flexion     Special Tests:     neg (-) Jessica's, otherwise difficult special testing due to guarding     Gait:      normal     Lower Extremity Alignment:      Normal     Neurovascular:      2+ peripheral pulses bilaterally and brisk capillary refill       sensation grossly intact    Radiology  I ordered, visualized and reviewed these images with the patient  2 XR views of right knee reviewed: significant degenerative changes, healing transverse fracture  - will follow official read      Assessment:  1. Closed nondisplaced transverse fracture of right patella with routine healing      Start PT for strengthening, follow up in 2 weeks.    Plan:  - Today's Plan of Care:  Rehab: Physical Therapy: [facility] - [phone]  Letter for work provided    Follow Up: 2-3 weeks with no x ray    Concerning signs and symptoms were reviewed.  The patient expressed understanding of this management plan and all questions were answered at this time.    Miroslava Parks MD [facility]  Primary Care Sports Medicine  [facility]    Again, thank you for allowing me to participate in the care of your patient.  "       Sincerely,        Miroslava Parks MD

## 2019-02-27 ENCOUNTER — THERAPY VISIT (OUTPATIENT)
Dept: PHYSICAL THERAPY | Facility: CLINIC | Age: 64
End: 2019-02-27
Payer: OTHER MISCELLANEOUS

## 2019-02-27 DIAGNOSIS — M25.561 ACUTE PAIN OF RIGHT KNEE: Primary | ICD-10-CM

## 2019-02-27 DIAGNOSIS — S82.034D CLOSED NONDISPLACED TRANSVERSE FRACTURE OF RIGHT PATELLA WITH ROUTINE HEALING: ICD-10-CM

## 2019-02-27 PROCEDURE — 97161 PT EVAL LOW COMPLEX 20 MIN: CPT | Mod: GP | Performed by: PHYSICAL THERAPIST

## 2019-02-27 PROCEDURE — 97110 THERAPEUTIC EXERCISES: CPT | Mod: GP | Performed by: PHYSICAL THERAPIST

## 2019-02-27 ASSESSMENT — ACTIVITIES OF DAILY LIVING (ADL)
SQUAT: ACTIVITY IS VERY DIFFICULT
RAW_SCORE: 47
HOW_WOULD_YOU_RATE_THE_CURRENT_FUNCTION_OF_YOUR_KNEE_DURING_YOUR_USUAL_DAILY_ACTIVITIES_ON_A_SCALE_FROM_0_TO_100_WITH_100_BEING_YOUR_LEVEL_OF_KNEE_FUNCTION_PRIOR_TO_YOUR_INJURY_AND_0_BEING_THE_INABILITY_TO_PERFORM_ANY_OF_YOUR_USUAL_DAILY_ACTIVITIES?: 50
HOW_WOULD_YOU_RATE_THE_OVERALL_FUNCTION_OF_YOUR_KNEE_DURING_YOUR_USUAL_DAILY_ACTIVITIES?: ABNORMAL
RISE FROM A CHAIR: ACTIVITY IS MINIMALLY DIFFICULT
KNEE_ACTIVITY_OF_DAILY_LIVING_SUM: 47
KNEE_ACTIVITY_OF_DAILY_LIVING_SCORE: 67.14
SIT WITH YOUR KNEE BENT: ACTIVITY IS SOMEWHAT DIFFICULT
AS_A_RESULT_OF_YOUR_KNEE_INJURY,_HOW_WOULD_YOU_RATE_YOUR_CURRENT_LEVEL_OF_DAILY_ACTIVITY?: ABNORMAL
STIFFNESS: THE SYMPTOM AFFECTS MY ACTIVITY SLIGHTLY
WEAKNESS: THE SYMPTOM AFFECTS MY ACTIVITY MODERATELY
GIVING WAY, BUCKLING OR SHIFTING OF KNEE: I DO NOT HAVE THE SYMPTOM
LIMPING: I DO NOT HAVE THE SYMPTOM
SWELLING: I DO NOT HAVE THE SYMPTOM
WALK: ACTIVITY IS MINIMALLY DIFFICULT
KNEEL ON THE FRONT OF YOUR KNEE: ACTIVITY IS VERY DIFFICULT
GO DOWN STAIRS: ACTIVITY IS MINIMALLY DIFFICULT
GO UP STAIRS: ACTIVITY IS MINIMALLY DIFFICULT
PAIN: THE SYMPTOM AFFECTS MY ACTIVITY MODERATELY
STAND: ACTIVITY IS MINIMALLY DIFFICULT

## 2019-02-27 NOTE — PROGRESS NOTES
[facility] Initial Evaluation -- Lower Extremity    Evaluation Date: February 27, 2019  Mina Ames is a 63 year old male with a R knee condition.   Referral: Ortho  Work mechanical stresses:   - driving, lifting, carrying  Employment status: not currently working - 2 more weeks  Leisure mechanical stresses:  30 min stationary bike, walking ~3 miles daily  Functional disability score: see flow sheet  VAS score (0-10):  3/10  Patient goals/expectations:  Decrease pain to return to recreational activity and work duties    HISTORY:    Present symptoms:  R knee, no swelling  Pain quality (sharp/shooting/stabbing/aching/burning/cramping):   dull/aching    Present since (onset date):  1/9/2019. MD referral 2/25/2019    Symptoms (improving/unchaning/worsening):   Improving    Symptoms commenced as a result of:  Fall  Condition occurred in the following environment:  Work    Symptoms at onset: R knee pain, swelling  Paresthesia (yes/no):   no  Spinal history:  no   Cough/Sneeze (pos/neg):   no    Constant symptoms: R knee  Intermittent symptoms:     Symptoms are worse with the following:  sometimes squatting,  Sometimes kneeling, sometimes stairs (down>up), sometimes on the move, time of day - not a factor, sometimes rising and first few steps   Symptoms are better with the following:  sometimes movement (decreases stiffness- stationary bike), hot shower    Continued use makes the pain (better/worse/no effect):  worse     Disturbed night (yes/no): yes; occasionally     Pain at rest (yes/no):   yes  Site (back/hip/knee/ankle/foot):  R knee    Other questions (swelling/clicking/locking/giving way/falling):   occasional clicking     Previous episodes:  no  Previous treatments:  no    Specific Questions:  General health (excellent/good/fair/poor):   good   Pertinent medical history includes: Overweight  Medications (nil/NSAIDS/analg/steroids/anticoag/other):  None  Medical allergies:   none  Imaging (none/Xray/MRI/other):   x-ray - closed nondisplaced transverse fracture of R patella  Recent or major surgery (yes/no):   wrist, appendix   Night pain (yes/no):   no  Accidents (yes/no):   no  Unexplained weight loss (yes/no):   no  Barriers at home:  no   Other red flags:  no    Sites for physical examination (back/hip/knee/ankle/foot/other): knee    EXAMINATION    Posture:  Sitting (good/fair/poor): fair    Correction of Posture (better/worse/no effect/NA): NA  Standing (good/fair/poor): fair  Other observations:  Gait: mild lateral trunk lean     Neurological: (NA/motor/sensory/reflexes/dural): NA    Baselines (pain or functional activity): squat 2/10 pain    Extremities (Hip / Knee / Ankle / Foot): knee    Movement Loss Santana Mod Min Nil Pain   Flexion     L 113  R 118   Extension     L lacking 5   Abduction     R lacking 2     Passive Movement (+/- over pressure)/(PDM/ERP):  NA  Resisted Test Response (pain): L hip flexor, quad, HS, DF 4+/5; R hip flexor, quad, HS, DF* 4/5. Hip abd 5/5 B; hip ext 3+ B.   Other Tests:     Spine:  Movement loss: NA  Effect of repeated movements: NA  Effect of static positioning: NA  Spine testing (not relevant/relevant/secondary problem): NA    Baseline Symptoms: squat 2/10 pain in ant R knee, see ROM above   Repeated Tests Symptom Response Mechanical Response   Active/Passive movement, resisted test, functional test During -  Produce, Abolish, Increase, Decrease, NE After -  Better, Worse, NB, NW, NE Effect -   ? or ? ROM, strength or key functional test No   Effect   Repeated knee extension in sitting with self OP No Effect    No Effect    X  Increase ROM 2-0-120; decrease  pn to 1/10 with squat    Effect of static positioning                Provisional Classification (Extremity/Spine):  Extremity - Derangement      Princicple of Management:   Education:  Instruct on HEP and reassess between sets. Localization of pain.     Equipment provided:  none  Exercise and  dosage:  Repeated knee extension in sitting with self OP - 10-15 reps every 2 hours    ASSESSMENT/PLAN:    Patient is a 63 year old male with right side knee complaints.    Patient has the following significant findings with corresponding treatment plan.                Diagnosis 1:  R knee pain  Pain -  hot/cold therapy, manual therapy, self management, education, directional preference exercise and home program  Decreased ROM/flexibility - manual therapy, therapeutic exercise and home program  Decreased strength - therapeutic exercise, therapeutic activities and home program  Impaired gait - gait training and home program  Impaired muscle performance - neuro re-education and home program  Decreased function - therapeutic activities and home program  Instability -  Therapeutic Activity  Therapeutic Exercise  home program        Previous and current functional limitations:  (See Goal Flow Sheet for this information)    Short term and Long term goals: (See Goal Flow Sheet for this information)     Communication ability:  Patient appears to be able to clearly communicate and understand verbal and written communication and follow directions correctly.  Treatment Explanation - The following has been discussed with the patient:   RX ordered/plan of care  Anticipated outcomes  Possible risks and side effects  This patient would benefit from PT intervention to resume normal activities.   Rehab potential is good.    Frequency:  1 X week, once daily  Duration:  for 6 weeks  Discharge Plan:  Achieve all LTG.  Independent in home treatment program.  Reach maximal therapeutic benefit.    Please refer to the daily flowsheet for treatment today, total treatment time and time spent performing 1:1 timed codes.

## 2019-03-07 ENCOUNTER — THERAPY VISIT (OUTPATIENT)
Dept: PHYSICAL THERAPY | Facility: CLINIC | Age: 64
End: 2019-03-07
Payer: OTHER MISCELLANEOUS

## 2019-03-07 DIAGNOSIS — M25.561 ACUTE PAIN OF RIGHT KNEE: Primary | ICD-10-CM

## 2019-03-07 PROCEDURE — 97110 THERAPEUTIC EXERCISES: CPT | Mod: GP | Performed by: PHYSICAL THERAPIST

## 2019-03-07 NOTE — PROGRESS NOTES
Subjective:  HPI                    Objective:  System    Physical Exam    General     ROS    Assessment/Plan:    PROGRESS  REPORT    Progress reporting period is from 2/27/19 to 3/7/19.       SUBJECTIVE  Subjective changes noted by patient: Pt reports he is feeling a bit better this week. He has gone walking in the park 3 days this week and has been riding his exercise bike. He still has mild pain with stairs. He is completing his knee stretch 4-5x/day.     Current pain level is 1-2/10.     Previous pain level was 3/10.   Changes in function:  Yes (See Goal flowsheet attached for changes in current functional level)  Adverse reaction to treatment or activity: None    OBJECTIVE  Changes noted in objective findings:  Yes  Objective: Knee ROM R: 3-115. Strength 5/5 HF, Q, HS, DF. Pn in R knee with DF. Baselines: Step up&over 2/10 pn, squat 3/10 pn     ASSESSMENT/PLAN  Updated problem list and treatment plan: Diagnosis 1:  R knee pain  Pain -  hot/cold therapy, manual therapy, self management, education, directional preference exercise and home program  Decreased ROM/flexibility - manual therapy, therapeutic exercise and home program  Decreased strength - therapeutic exercise, therapeutic activities and home program  Impaired gait - gait training and home program  Impaired muscle performance - neuro re-education and home program  Decreased function - therapeutic activities and home program  Instability -  Therapeutic Activity  Therapeutic Exercise  home program  STG/LTGs have been met or progress has been made towards goals:  Yes (See Goal flow sheet completed today.)  Assessment of Progress: The patient's condition is improving.  Self Management Plans:  Patient has been instructed in a home treatment program.  I have re-evaluated this patient and find that the nature, scope, duration and intensity of the therapy is appropriate for the medical condition of the patient.  Mina continues to require the following  intervention to meet STG and LTG's:  PT    Recommendations:  This patient would benefit from continued therapy.     Frequency:  1 X week, once daily  Duration:  for 5 weeks        Please refer to the daily flowsheet for treatment today, total treatment time and time spent performing 1:1 timed codes.

## 2019-03-11 ENCOUNTER — OFFICE VISIT (OUTPATIENT)
Dept: ORTHOPEDICS | Facility: CLINIC | Age: 64
End: 2019-03-11
Payer: OTHER MISCELLANEOUS

## 2019-03-11 VITALS
BODY MASS INDEX: 30.06 KG/M2 | SYSTOLIC BLOOD PRESSURE: 122 MMHG | WEIGHT: 210 LBS | DIASTOLIC BLOOD PRESSURE: 88 MMHG | HEIGHT: 70 IN

## 2019-03-11 DIAGNOSIS — S82.034D CLOSED NONDISPLACED TRANSVERSE FRACTURE OF RIGHT PATELLA WITH ROUTINE HEALING: Primary | ICD-10-CM

## 2019-03-11 PROCEDURE — 99207 ZZC FRACTURE CARE IN GLOBAL PERIOD: CPT | Performed by: PEDIATRICS

## 2019-03-11 ASSESSMENT — MIFFLIN-ST. JEOR: SCORE: 1750.86

## 2019-03-11 NOTE — PATIENT INSTRUCTIONS
Plan:  - Today's Plan of Care:  Continue with physical therapy  Letter for work provided    Follow Up: 1 month with repeat x rays    If you have any further questions for your physician or physician s care team you can call [phone] and use option 3 to leave a voice message. Calls received during business hours will be returned same day.  ,

## 2019-03-11 NOTE — LETTER
March 11, 2019      Mina ARNOLD Ames  [address]  [facility] [address]        To Whom It May Concern,      Mina is under my care for a right knee injury.  He may return to work with the following restrictions:  - Light duty only, limit lifting to under 20lbs  - Ok to drive  - Brace as needed    5 hour shifts for the next two weeks, then can work full 10 hour shifts with the above restrictions.    Continue PT and follow up in 1 month.      Sincerely,        Miroslava Parks MD

## 2019-03-11 NOTE — LETTER
3/11/2019         RE: Mina Ames  [address]  [facility] [address]        Dear Colleague,    Thank you for referring your patient, Mina Ames, to the [facility]. Please see a copy of my visit note below.    Sports Medicine Clinic Visit - Interim History March 11, 2019      PCP: Escobar Curry    Mina Ames is a 63 year old male who is seen in f/u up for Closed nondisplaced transverse fracture of right patella with routine healing. Since last visit on 2/25/19 patient has started physical therapy. He report his knee feels stronger and he has less pain. He continues to use his knee brace. He as been increasing his walking and biking. He has been doing his HEP regularly.   - Now ~ 8.5 weeks from initial injury    Social History:  for a [employer]    Review of Systems  Skin: no bruising, initial swelling  Musculoskeletal: as above  Neurologic: no numbness, paresthesias  Remainder of review of systems is negative including constitutional, CV, pulmonary, GI, except as noted in HPI or medical history.    Patient's current problem list, past medical and surgical history, and family history were reviewed.    Patient Active Problem List   Diagnosis     Hyperlipidemia     Adult BMI 30+     DJD (degenerative joint disease) of knee     HYPERLIPIDEMIA LDL GOAL <160     Acute pain of right knee     Past Medical History:   Diagnosis Date     Adult BMI 30+      DJD (degenerative joint disease) of knee     xray Oct., 2009     Hyperlipidemia      Squamous cell skin cancer 2007    chest     Past Surgical History:   Procedure Laterality Date     C APPENDECTOMY  1971     COLONOSCOPY  2016     SURGICAL HISTORY OF -   1995    wrist     Family History   Problem Relation Age of Onset     Hypertension Mother      Heart Disease Father      Hypertension Father      Lipids Father      Depression Father      Coronary Artery Disease Father         Bypass surgery - 3   "      Objective  /88 (BP Location: Left arm, Patient Position: Chair, Cuff Size: Adult Regular)   Ht 1.765 m (5' 9.5\")   Wt 95.3 kg (210 lb)   BMI 30.57 kg/m       GENERAL APPEARANCE: healthy, alert and no distress   GAIT: NORMAL  SKIN: no suspicious lesions or rashes  HEENT: Sclera clear, anicteric  CV: good peripheral pulses  RESP: Breathing not labored  NEURO: Normal strength and tone, mentation intact and speech normal  PSYCH:  mentation appears normal and affect normal/bright    Bilateral Knee exam  Inspection:             minimal swelling right     Patella:      Mobility -       hypomobile right     Tender:      none currently     Non Tender:      remainder of knee area right     Knee ROM:      Flexion 120 degrees right       Extension 0-4 degrees right  - symmetric motion to left     Strength:      SLR intact, 5/5 strength with knee flexion     Special Tests:     neg (-) Jessica's, neg (-) anterior and posterior drawer     Gait:      normal     Lower Extremity Alignment:      Normal     Neurovascular:      2+ peripheral pulses bilaterally and brisk capillary refill       sensation grossly intact       Radiology  I visualized and reviewed these images with the patient  Xr Knee Right 1/2 Views  Result Date: 2/25/2019  RIGHT KNEE ONE TO TWO VIEWS  2/25/2019 11:17 AM HISTORY:  Knee pain. Closed nondisplaced transverse fracture of right patella with routine healing. COMPARISON:  2/4/2019. FINDINGS: Again there is three-compartment spurring and some medial compartment narrowing. Patellar fracture again seen.   IMPRESSION: No significant change. LOLLY ANGLIN MD    Assessment:  1. Closed nondisplaced transverse fracture of right patella with routine healing      Healing fracture  Discussed continued PT, can wean brace as symptoms allow  Return to work with some light duty restrictions.    Plan:  - Today's Plan of Care:  Continue with physical therapy  Letter for work provided    Follow Up: 1 month with " repeat x rays    Concerning signs and symptoms were reviewed.  The patient expressed understanding of this management plan and all questions were answered at this time.    Miroslava Parks MD [facility]  Primary Care Sports Medicine  [facility]    Again, thank you for allowing me to participate in the care of your patient.        Sincerely,        Miroslava Parks MD

## 2019-03-11 NOTE — PROGRESS NOTES
"Sports Medicine Clinic Visit - Interim History March 11, 2019      PCP: Escobar Curry CLAYTON Ames is a 63 year old male who is seen in f/u up for Closed nondisplaced transverse fracture of right patella with routine healing. Since last visit on 2/25/19 patient has started physical therapy. He report his knee feels stronger and he has less pain. He continues to use his knee brace. He as been increasing his walking and biking. He has been doing his HEP regularly.   - Now ~ 8.5 weeks from initial injury    Social History:  for a [employer]    Review of Systems  Skin: no bruising, initial swelling  Musculoskeletal: as above  Neurologic: no numbness, paresthesias  Remainder of review of systems is negative including constitutional, CV, pulmonary, GI, except as noted in HPI or medical history.    Patient's current problem list, past medical and surgical history, and family history were reviewed.    Patient Active Problem List   Diagnosis     Hyperlipidemia     Adult BMI 30+     DJD (degenerative joint disease) of knee     HYPERLIPIDEMIA LDL GOAL <160     Acute pain of right knee     Past Medical History:   Diagnosis Date     Adult BMI 30+      DJD (degenerative joint disease) of knee     xray Oct., 2009     Hyperlipidemia      Squamous cell skin cancer 2007    chest     Past Surgical History:   Procedure Laterality Date     C APPENDECTOMY  1971     COLONOSCOPY  2016     SURGICAL HISTORY OF -   1995    wrist     Family History   Problem Relation Age of Onset     Hypertension Mother      Heart Disease Father      Hypertension Father      Lipids Father      Depression Father      Coronary Artery Disease Father         Bypass surgery - 3        Objective  /88 (BP Location: Left arm, Patient Position: Chair, Cuff Size: Adult Regular)   Ht 1.765 m (5' 9.5\")   Wt 95.3 kg (210 lb)   BMI 30.57 kg/m      GENERAL APPEARANCE: healthy, alert and no distress   GAIT: NORMAL  SKIN: no suspicious lesions or " rashes  HEENT: Sclera clear, anicteric  CV: good peripheral pulses  RESP: Breathing not labored  NEURO: Normal strength and tone, mentation intact and speech normal  PSYCH:  mentation appears normal and affect normal/bright    Bilateral Knee exam  Inspection:             minimal swelling right     Patella:      Mobility -       hypomobile right     Tender:      none currently     Non Tender:      remainder of knee area right     Knee ROM:      Flexion 120 degrees right       Extension 0-4 degrees right  - symmetric motion to left     Strength:      SLR intact, 5/5 strength with knee flexion     Special Tests:     neg (-) Jessica's, neg (-) anterior and posterior drawer     Gait:      normal     Lower Extremity Alignment:      Normal     Neurovascular:      2+ peripheral pulses bilaterally and brisk capillary refill       sensation grossly intact       Radiology  I visualized and reviewed these images with the patient  Xr Knee Right 1/2 Views  Result Date: 2/25/2019  RIGHT KNEE ONE TO TWO VIEWS  2/25/2019 11:17 AM HISTORY:  Knee pain. Closed nondisplaced transverse fracture of right patella with routine healing. COMPARISON:  2/4/2019. FINDINGS: Again there is three-compartment spurring and some medial compartment narrowing. Patellar fracture again seen.   IMPRESSION: No significant change. LOLLY ANGLIN MD    Assessment:  1. Closed nondisplaced transverse fracture of right patella with routine healing      Healing fracture  Discussed continued PT, can wean brace as symptoms allow  Return to work with some light duty restrictions.    Plan:  - Today's Plan of Care:  Continue with physical therapy  Letter for work provided    Follow Up: 1 month with repeat x rays    Concerning signs and symptoms were reviewed.  The patient expressed understanding of this management plan and all questions were answered at this time.    Miroslava Parks MD [facility]  Primary Care Sports Medicine  [facility]

## 2019-03-13 ENCOUNTER — THERAPY VISIT (OUTPATIENT)
Dept: PHYSICAL THERAPY | Facility: CLINIC | Age: 64
End: 2019-03-13
Payer: OTHER MISCELLANEOUS

## 2019-03-13 DIAGNOSIS — M25.561 ACUTE PAIN OF RIGHT KNEE: ICD-10-CM

## 2019-03-13 PROCEDURE — 97110 THERAPEUTIC EXERCISES: CPT | Mod: GP | Performed by: PHYSICAL THERAPY ASSISTANT

## 2019-03-13 ASSESSMENT — ACTIVITIES OF DAILY LIVING (ADL)
GO DOWN STAIRS: ACTIVITY IS MINIMALLY DIFFICULT
KNEE_ACTIVITY_OF_DAILY_LIVING_SCORE: 82.86
WALK: ACTIVITY IS MINIMALLY DIFFICULT
LIMPING: I DO NOT HAVE THE SYMPTOM
WEAKNESS: I HAVE THE SYMPTOM BUT IT DOES NOT AFFECT MY ACTIVITY
SIT WITH YOUR KNEE BENT: ACTIVITY IS MINIMALLY DIFFICULT
HOW_WOULD_YOU_RATE_THE_CURRENT_FUNCTION_OF_YOUR_KNEE_DURING_YOUR_USUAL_DAILY_ACTIVITIES_ON_A_SCALE_FROM_0_TO_100_WITH_100_BEING_YOUR_LEVEL_OF_KNEE_FUNCTION_PRIOR_TO_YOUR_INJURY_AND_0_BEING_THE_INABILITY_TO_PERFORM_ANY_OF_YOUR_USUAL_DAILY_ACTIVITIES?: 50
STIFFNESS: THE SYMPTOM AFFECTS MY ACTIVITY SLIGHTLY
PAIN: THE SYMPTOM AFFECTS MY ACTIVITY SLIGHTLY
RAW_SCORE: 58
AS_A_RESULT_OF_YOUR_KNEE_INJURY,_HOW_WOULD_YOU_RATE_YOUR_CURRENT_LEVEL_OF_DAILY_ACTIVITY?: ABNORMAL
GO UP STAIRS: ACTIVITY IS MINIMALLY DIFFICULT
SQUAT: ACTIVITY IS MINIMALLY DIFFICULT
GIVING WAY, BUCKLING OR SHIFTING OF KNEE: I HAVE THE SYMPTOM BUT IT DOES NOT AFFECT MY ACTIVITY
KNEEL ON THE FRONT OF YOUR KNEE: ACTIVITY IS MINIMALLY DIFFICULT
KNEE_ACTIVITY_OF_DAILY_LIVING_SUM: 58
HOW_WOULD_YOU_RATE_THE_OVERALL_FUNCTION_OF_YOUR_KNEE_DURING_YOUR_USUAL_DAILY_ACTIVITIES?: NEARLY NORMAL
SWELLING: I DO NOT HAVE THE SYMPTOM
STAND: ACTIVITY IS NOT DIFFICULT
RISE FROM A CHAIR: ACTIVITY IS NOT DIFFICULT

## 2019-03-20 ENCOUNTER — THERAPY VISIT (OUTPATIENT)
Dept: PHYSICAL THERAPY | Facility: CLINIC | Age: 64
End: 2019-03-20
Payer: OTHER MISCELLANEOUS

## 2019-03-20 DIAGNOSIS — M25.561 ACUTE PAIN OF RIGHT KNEE: Primary | ICD-10-CM

## 2019-03-20 PROCEDURE — 97110 THERAPEUTIC EXERCISES: CPT | Mod: GP | Performed by: PHYSICAL THERAPIST

## 2019-03-20 PROCEDURE — 97530 THERAPEUTIC ACTIVITIES: CPT | Mod: GP | Performed by: PHYSICAL THERAPIST

## 2019-04-04 ENCOUNTER — THERAPY VISIT (OUTPATIENT)
Dept: PHYSICAL THERAPY | Facility: CLINIC | Age: 64
End: 2019-04-04
Payer: OTHER MISCELLANEOUS

## 2019-04-04 DIAGNOSIS — M25.561 ACUTE PAIN OF RIGHT KNEE: Primary | ICD-10-CM

## 2019-04-04 PROCEDURE — 97530 THERAPEUTIC ACTIVITIES: CPT | Mod: GP | Performed by: PHYSICAL THERAPIST

## 2019-04-04 PROCEDURE — 97110 THERAPEUTIC EXERCISES: CPT | Mod: GP | Performed by: PHYSICAL THERAPIST

## 2019-04-04 ASSESSMENT — ACTIVITIES OF DAILY LIVING (ADL)
SWELLING: I DO NOT HAVE THE SYMPTOM
HOW_WOULD_YOU_RATE_THE_CURRENT_FUNCTION_OF_YOUR_KNEE_DURING_YOUR_USUAL_DAILY_ACTIVITIES_ON_A_SCALE_FROM_0_TO_100_WITH_100_BEING_YOUR_LEVEL_OF_KNEE_FUNCTION_PRIOR_TO_YOUR_INJURY_AND_0_BEING_THE_INABILITY_TO_PERFORM_ANY_OF_YOUR_USUAL_DAILY_ACTIVITIES?: 95
GIVING WAY, BUCKLING OR SHIFTING OF KNEE: I HAVE THE SYMPTOM BUT IT DOES NOT AFFECT MY ACTIVITY
RISE FROM A CHAIR: ACTIVITY IS NOT DIFFICULT
SQUAT: ACTIVITY IS MINIMALLY DIFFICULT
GO UP STAIRS: ACTIVITY IS MINIMALLY DIFFICULT
STIFFNESS: I HAVE THE SYMPTOM BUT IT DOES NOT AFFECT MY ACTIVITY
WALK: ACTIVITY IS NOT DIFFICULT
AS_A_RESULT_OF_YOUR_KNEE_INJURY,_HOW_WOULD_YOU_RATE_YOUR_CURRENT_LEVEL_OF_DAILY_ACTIVITY?: NEARLY NORMAL
PAIN: I HAVE THE SYMPTOM BUT IT DOES NOT AFFECT MY ACTIVITY
KNEE_ACTIVITY_OF_DAILY_LIVING_SUM: 63
HOW_WOULD_YOU_RATE_THE_OVERALL_FUNCTION_OF_YOUR_KNEE_DURING_YOUR_USUAL_DAILY_ACTIVITIES?: NEARLY NORMAL
KNEEL ON THE FRONT OF YOUR KNEE: ACTIVITY IS MINIMALLY DIFFICULT
WEAKNESS: I DO NOT HAVE THE SYMPTOM
KNEE_ACTIVITY_OF_DAILY_LIVING_SCORE: 90
GO DOWN STAIRS: ACTIVITY IS MINIMALLY DIFFICULT
RAW_SCORE: 63
STAND: ACTIVITY IS NOT DIFFICULT
LIMPING: I DO NOT HAVE THE SYMPTOM
SIT WITH YOUR KNEE BENT: ACTIVITY IS NOT DIFFICULT

## 2019-04-04 NOTE — PROGRESS NOTES
Subjective:  HPI                    Objective:  System    Physical Exam    General     ROS    Assessment/Plan:    PROGRESS  REPORT    Progress reporting period is from 3/7/19 to 4/4/19.       SUBJECTIVE  Subjective changes noted by patient: Pt reports walking an average of 3mi/day (no pain) most days and biking on stationary bike daily. He is working normal 10 hr shifts requiring some bending and lifting at work but still utilizing asistance modifications.     Current pain level is 0/10  Previous pain level was 3/10.   Changes in function:  Yes (See Goal flowsheet attached for changes in current functional level)  Adverse reaction to treatment or activity: None    OBJECTIVE  Changes noted in objective findings:  Yes  Objective: R knee ROM: 3-0-122     ASSESSMENT/PLAN  Updated problem list and treatment plan: Diagnosis 1:  R knee pain    Pain -  hot/cold therapy, manual therapy, self management, education, directional preference exercise and home program  Decreased ROM/flexibility - manual therapy, therapeutic exercise and home program  Decreased strength - therapeutic exercise, therapeutic activities and home program  Impaired muscle performance - neuro re-education and home program  Decreased function - therapeutic activities and home program  STG/LTGs have been met or progress has been made towards goals:  Yes (See Goal flow sheet completed today.)  Assessment of Progress: The patient's condition is improving.  The patient's condition has potential to improve.  Self Management Plans:  Patient has been instructed in a home treatment program.  Patient is independent in a home treatment program.  I have re-evaluated this patient and find that the nature, scope, duration and intensity of the therapy is appropriate for the medical condition of the patient.  Mina continues to require the following intervention to meet STG and LTG's:  Pt can continue working toward STG & LTG through assigned HEP to achieve pain free  squatting.     Recommendations:  This patient is ready to be discharged from therapy and continue their home treatment program.    Pt may continue therapy per MD request.     Please refer to the daily flowsheet for treatment today, total treatment time and time spent performing 1:1 timed codes.

## 2019-04-11 ENCOUNTER — OFFICE VISIT (OUTPATIENT)
Dept: ORTHOPEDICS | Facility: CLINIC | Age: 64
End: 2019-04-11
Payer: OTHER MISCELLANEOUS

## 2019-04-11 ENCOUNTER — ANCILLARY PROCEDURE (OUTPATIENT)
Dept: GENERAL RADIOLOGY | Facility: CLINIC | Age: 64
End: 2019-04-11
Attending: PEDIATRICS
Payer: OTHER MISCELLANEOUS

## 2019-04-11 VITALS
BODY MASS INDEX: 30.21 KG/M2 | SYSTOLIC BLOOD PRESSURE: 124 MMHG | DIASTOLIC BLOOD PRESSURE: 84 MMHG | HEIGHT: 70 IN | WEIGHT: 211 LBS

## 2019-04-11 DIAGNOSIS — S82.034D CLOSED NONDISPLACED TRANSVERSE FRACTURE OF RIGHT PATELLA WITH ROUTINE HEALING: ICD-10-CM

## 2019-04-11 DIAGNOSIS — S82.034D CLOSED NONDISPLACED TRANSVERSE FRACTURE OF RIGHT PATELLA WITH ROUTINE HEALING: Primary | ICD-10-CM

## 2019-04-11 PROCEDURE — 99212 OFFICE O/P EST SF 10 MIN: CPT | Mod: 25 | Performed by: PEDIATRICS

## 2019-04-11 PROCEDURE — 73560 X-RAY EXAM OF KNEE 1 OR 2: CPT | Mod: RT

## 2019-04-11 ASSESSMENT — MIFFLIN-ST. JEOR: SCORE: 1755.4

## 2019-04-11 NOTE — LETTER
April 11, 2019      Mina Ames  [address] [facility] [address]        To Whom It May Concern,     Mina is under my care for an injury.  He may return to work without restrictions.  Follow up is as needed in 2 months.  Continue home exercise program.      Sincerely,        Miroslava Parks MD

## 2019-04-11 NOTE — PATIENT INSTRUCTIONS
Plan:  - Today's Plan of Care:  Continue with home exercise program  Letter for work provided    Follow Up: 2 months as needed    If you have any further questions for your physician or physician s care team you can call [phone] and use option 3 to leave a voice message. Calls received during business hours will be returned same day.

## 2019-04-11 NOTE — LETTER
4/11/2019         RE: Mina Ames  [address]  [facility] [address]        Dear Colleague,    Thank you for referring your patient, Mina Ames, to the [facility]. Please see a copy of my visit note below.    Sports Medicine Clinic Visit - Interim History April 11, 2019      PCP: Escobar Curry    Mina Ames is a 63 year old male who is seen in f/u up for Closed nondisplaced transverse fracture of right patella with routine healing. Since last visit on 3/11/19, patient has completed physical therapy. He reports no pain or discomfort in the knee and has full ROM. He states he feels ready to return to work full time.  Long shift yesterday went ok.  - Now ~ 3 months from initial injury    Social History:  for a [employer]    Review of Systems  Skin: no bruising, no swelling  Musculoskeletal: as above  Neurologic: no numbness, paresthesias  Remainder of review of systems is negative including constitutional, CV, pulmonary, GI, except as noted in HPI or medical history.    Patient's current problem list, past medical and surgical history, and family history were reviewed.    Patient Active Problem List   Diagnosis     Hyperlipidemia     Adult BMI 30+     DJD (degenerative joint disease) of knee     HYPERLIPIDEMIA LDL GOAL <160     Acute pain of right knee     Past Medical History:   Diagnosis Date     Adult BMI 30+      DJD (degenerative joint disease) of knee     xray Oct., 2009     Hyperlipidemia      Squamous cell skin cancer 2007    chest     Past Surgical History:   Procedure Laterality Date     C APPENDECTOMY  1971     COLONOSCOPY  2016     SURGICAL HISTORY OF -   1995    wrist     Family History   Problem Relation Age of Onset     Hypertension Mother      Heart Disease Father      Hypertension Father      Lipids Father      Depression Father      Coronary Artery Disease Father         Bypass surgery - 3        Objective  /84   Ht 1.765  "m (5' 9.5\")   Wt 95.7 kg (211 lb)   BMI 30.71 kg/m       GENERAL APPEARANCE: healthy, alert and no distress   GAIT: NORMAL  SKIN: no suspicious lesions or rashes  HEENT: Sclera clear, anicteric  CV: good peripheral pulses  RESP: Breathing not labored  NEURO: Normal strength and tone, mentation intact and speech normal  PSYCH:  mentation appears normal and affect normal/bright    Bilateral Knee exam  Inspection:             minimal swelling right     Patella:      Mobility -       hypomobile right     Tender:      none currently     Non Tender:      remainder of knee area right     Knee ROM:      Flexion 120 degrees right       Extension 0-4 degrees right  - symmetric motion to left     Strength:      SLR intact, 5/5 strength with knee flexion     Special Tests:     neg (-) Jessica's, neg (-) anterior and posterior drawer     Gait:      normal     Lower Extremity Alignment:      Normal     Neurovascular:      2+ peripheral pulses bilaterally and brisk capillary refill       sensation grossly intact    Radiology  I ordered, visualized and reviewed these images with the patient  2 XR views of right knee reviewed: healing patella fracture, with three compartment arthritis  - will follow official read    Assessment:  1. Closed nondisplaced transverse fracture of right patella with routine healing      Healing fracture, continue PT/HEP, work restrictions lifted, follow up in 2 months if needed.    Discussed nature of degenerative arthrosis of the knee. Discussed symptom treatment with over-the-counter medications, ice or heat, topical treatments, and rest if needed. Discussed use of sleeve or wrap for comfort. Discussed benefits of exercise and weight loss to reduce pressure at the knee. Discussed injection therapy. Also briefly discussed future consideration of referral to orthopedic surgery for further evaluation and discussion of arthroplasty.    Plan:  - Today's Plan of Care:  Continue with home exercise " program  Letter for work provided    Follow Up: 2 months as needed    Concerning signs and symptoms were reviewed.  The patient expressed understanding of this management plan and all questions were answered at this time.    Miroslava Parks MD [facility]  Primary Care Sports Medicine  [facility]    Again, thank you for allowing me to participate in the care of your patient.        Sincerely,        Miroslava Parks MD

## 2019-04-11 NOTE — PROGRESS NOTES
"Sports Medicine Clinic Visit - Interim History April 11, 2019      PCP: Escobar Curry CLAYTON Ames is a 63 year old male who is seen in f/u up for Closed nondisplaced transverse fracture of right patella with routine healing. Since last visit on 3/11/19, patient has completed physical therapy. He reports no pain or discomfort in the knee and has full ROM. He states he feels ready to return to work full time.  Long shift yesterday went ok.  - Now ~ 3 months from initial injury    Social History:  for a [employer]    Review of Systems  Skin: no bruising, no swelling  Musculoskeletal: as above  Neurologic: no numbness, paresthesias  Remainder of review of systems is negative including constitutional, CV, pulmonary, GI, except as noted in HPI or medical history.    Patient's current problem list, past medical and surgical history, and family history were reviewed.    Patient Active Problem List   Diagnosis     Hyperlipidemia     Adult BMI 30+     DJD (degenerative joint disease) of knee     HYPERLIPIDEMIA LDL GOAL <160     Acute pain of right knee     Past Medical History:   Diagnosis Date     Adult BMI 30+      DJD (degenerative joint disease) of knee     xray Oct., 2009     Hyperlipidemia      Squamous cell skin cancer 2007    chest     Past Surgical History:   Procedure Laterality Date     C APPENDECTOMY  1971     COLONOSCOPY  2016     SURGICAL HISTORY OF -   1995    wrist     Family History   Problem Relation Age of Onset     Hypertension Mother      Heart Disease Father      Hypertension Father      Lipids Father      Depression Father      Coronary Artery Disease Father         Bypass surgery - 3        Objective  /84   Ht 1.765 m (5' 9.5\")   Wt 95.7 kg (211 lb)   BMI 30.71 kg/m      GENERAL APPEARANCE: healthy, alert and no distress   GAIT: NORMAL  SKIN: no suspicious lesions or rashes  HEENT: Sclera clear, anicteric  CV: good peripheral pulses  RESP: Breathing not labored  NEURO: " Normal strength and tone, mentation intact and speech normal  PSYCH:  mentation appears normal and affect normal/bright    Bilateral Knee exam  Inspection:             minimal swelling right     Patella:      Mobility -       hypomobile right     Tender:      none currently     Non Tender:      remainder of knee area right     Knee ROM:      Flexion 120 degrees right       Extension 0-4 degrees right  - symmetric motion to left     Strength:      SLR intact, 5/5 strength with knee flexion     Special Tests:     neg (-) Jessica's, neg (-) anterior and posterior drawer     Gait:      normal     Lower Extremity Alignment:      Normal     Neurovascular:      2+ peripheral pulses bilaterally and brisk capillary refill       sensation grossly intact    Radiology  I ordered, visualized and reviewed these images with the patient  2 XR views of right knee reviewed: healing patella fracture, with three compartment arthritis  - will follow official read    Assessment:  1. Closed nondisplaced transverse fracture of right patella with routine healing      Healing fracture, continue PT/HEP, work restrictions lifted, follow up in 2 months if needed.    Discussed nature of degenerative arthrosis of the knee. Discussed symptom treatment with over-the-counter medications, ice or heat, topical treatments, and rest if needed. Discussed use of sleeve or wrap for comfort. Discussed benefits of exercise and weight loss to reduce pressure at the knee. Discussed injection therapy. Also briefly discussed future consideration of referral to orthopedic surgery for further evaluation and discussion of arthroplasty.    Plan:  - Today's Plan of Care:  Continue with home exercise program  Letter for work provided    Follow Up: 2 months as needed    Concerning signs and symptoms were reviewed.  The patient expressed understanding of this management plan and all questions were answered at this time.    Miroslava Parks MD CAQ  Primary Care Sports  Medicine  [facility]

## 2019-06-20 ENCOUNTER — OFFICE VISIT (OUTPATIENT)
Dept: ORTHOPEDICS | Facility: CLINIC | Age: 64
End: 2019-06-20
Payer: OTHER MISCELLANEOUS

## 2019-06-20 VITALS
WEIGHT: 211 LBS | DIASTOLIC BLOOD PRESSURE: 71 MMHG | HEIGHT: 70 IN | SYSTOLIC BLOOD PRESSURE: 118 MMHG | BODY MASS INDEX: 30.21 KG/M2

## 2019-06-20 DIAGNOSIS — S82.034D CLOSED NONDISPLACED TRANSVERSE FRACTURE OF RIGHT PATELLA WITH ROUTINE HEALING: Primary | ICD-10-CM

## 2019-06-20 PROCEDURE — 99213 OFFICE O/P EST LOW 20 MIN: CPT | Performed by: PEDIATRICS

## 2019-06-20 ASSESSMENT — MIFFLIN-ST. JEOR: SCORE: 1755.4

## 2019-06-20 NOTE — LETTER
June 20, 2019      Mina Ames  [address] [facility] [address]        To Whom It May Concern,      Mina is under my care for an injury.  He may return to work without restrictions.  Follow up is as needed.  Continue home exercise program.        Sincerely,           Miroslava Parks MD

## 2019-06-20 NOTE — PATIENT INSTRUCTIONS
Plan:  - Today's Plan of Care:  Continue to Monitor Symptoms  Letter for work    -We also discussed other future treatment options:  Further X-rays or CT, Further PT    Follow Up: as needed    If you have any further questions for your physician or physician s care team you can call [phone] and use option 3 to leave a voice message. Calls received during business hours will be returned same day.

## 2019-06-20 NOTE — LETTER
"    6/20/2019         RE: Mina Ames  [address]  [facility] [address]        Dear Colleague,    Thank you for referring your patient, Mina Ames, to the [facility]. Please see a copy of my visit note below.    Sports Medicine Clinic Visit - Interim History June 20, 2019      PCP: Escobar Curry    Mina Ames is a 63 year old male who is seen in f/u up for Closed nondisplaced transverse fracture of right patella with routine healing. Since last visit on 4/11/19 patient has reports of no knee pain or discomfort. He reports he has returned to full time work. He is not using the knee brace.  - Now ~ 5 months from initial injury    Social History:  for a [employer]    Review of Systems  Skin: no bruising, no swelling  Musculoskeletal: as above  Neurologic: no numbness, paresthesias  Remainder of review of systems is negative including constitutional, CV, pulmonary, GI, except as noted in HPI or medical history.    Patient's current problem list, past medical and surgical history, and family history were reviewed.    Patient Active Problem List   Diagnosis     Hyperlipidemia     Adult BMI 30+     DJD (degenerative joint disease) of knee     HYPERLIPIDEMIA LDL GOAL <160     Acute pain of right knee     Past Medical History:   Diagnosis Date     Adult BMI 30+      DJD (degenerative joint disease) of knee     xray Oct., 2009     Hyperlipidemia      Squamous cell skin cancer 2007    chest     Past Surgical History:   Procedure Laterality Date     C APPENDECTOMY  1971     COLONOSCOPY  2016     SURGICAL HISTORY OF -   1995    wrist     Family History   Problem Relation Age of Onset     Hypertension Mother      Heart Disease Father      Hypertension Father      Lipids Father      Depression Father      Coronary Artery Disease Father         Bypass surgery - 3        Objective  /71   Ht 1.765 m (5' 9.5\")   Wt 95.7 kg (211 lb)   BMI 30.71 kg/m   "     GENERAL APPEARANCE: healthy, alert and no distress   GAIT: NORMAL  SKIN: no suspicious lesions or rashes  HEENT: Sclera clear, anicteric  CV: good peripheral pulses  RESP: Breathing not labored  NEURO: Normal strength and tone, mentation intact and speech normal  PSYCH:  mentation appears normal and affect normal/bright    Bilateral Knee exam  Inspection:             minimal swelling right     Patella:      Mobility -       hypomobile right     Tender:      none currently     Non Tender:      remainder of knee area right     Knee ROM:      Flexion 120 degrees right       Extension 0-4 degrees right  - symmetric motion to left     Strength:      SLR intact, 5/5 strength with knee flexion     Special Tests:     neg (-) Jessica's, neg (-) anterior and posterior drawer     Gait:      normal     Lower Extremity Alignment:      Normal     Neurovascular:      2+ peripheral pulses bilaterally and brisk capillary refill       sensation grossly intact    Radiology  I visualized and reviewed these images with the patient  RIGHT KNEE ONE TO TWO VIEWS  4/11/2019 10:32 AM   HISTORY: Knee pain. Closed nondisplaced transverse fracture of right  patella with routine healing.  COMPARISON: 2/25/2019                                                           IMPRESSION: Patellar inferior pole lucency or fracture is becoming  less well seen. Medial and patellofemoral compartment degenerative  changes are noted, unchanged. Small residual joint effusion is  suspected.    Assessment:  1. Closed nondisplaced transverse fracture of right patella with routine healing      Doing well post fracture, could consider repeat x-rays, however, doing well clinically.  Continue work without restrictions.  Continue home exercise program.  Follow up as needed only.    Plan:  - Today's Plan of Care:  Continue to Monitor Symptoms  Letter for work    -We also discussed other future treatment options:  Further X-rays or CT, Further PT    Follow Up: as  needed    Concerning signs and symptoms were reviewed.  The patient expressed understanding of this management plan and all questions were answered at this time.    Miroslava Parks MD [facility]  Primary Care Sports Medicine  [facility]    Again, thank you for allowing me to participate in the care of your patient.        Sincerely,        Miroslava Parks MD

## 2019-06-20 NOTE — PROGRESS NOTES
"Sports Medicine Clinic Visit - Interim History June 20, 2019      PCP: Escobar Curry CLAYTON Ames is a 63 year old male who is seen in f/u up for Closed nondisplaced transverse fracture of right patella with routine healing. Since last visit on 4/11/19 patient has reports of no knee pain or discomfort. He reports he has returned to full time work. He is not using the knee brace.  - Now ~ 5 months from initial injury    Social History:  for a hotel    Review of Systems  Skin: no bruising, no swelling  Musculoskeletal: as above  Neurologic: no numbness, paresthesias  Remainder of review of systems is negative including constitutional, CV, pulmonary, GI, except as noted in HPI or medical history.    Patient's current problem list, past medical and surgical history, and family history were reviewed.    Patient Active Problem List   Diagnosis     Hyperlipidemia     Adult BMI 30+     DJD (degenerative joint disease) of knee     HYPERLIPIDEMIA LDL GOAL <160     Acute pain of right knee     Past Medical History:   Diagnosis Date     Adult BMI 30+      DJD (degenerative joint disease) of knee     xray Oct., 2009     Hyperlipidemia      Squamous cell skin cancer 2007    chest     Past Surgical History:   Procedure Laterality Date     C APPENDECTOMY  1971     COLONOSCOPY  2016     SURGICAL HISTORY OF -   1995    wrist     Family History   Problem Relation Age of Onset     Hypertension Mother      Heart Disease Father      Hypertension Father      Lipids Father      Depression Father      Coronary Artery Disease Father         Bypass surgery - 3        Objective  /71   Ht 1.765 m (5' 9.5\")   Wt 95.7 kg (211 lb)   BMI 30.71 kg/m      GENERAL APPEARANCE: healthy, alert and no distress   GAIT: NORMAL  SKIN: no suspicious lesions or rashes  HEENT: Sclera clear, anicteric  CV: good peripheral pulses  RESP: Breathing not labored  NEURO: Normal strength and tone, mentation intact and speech " normal  PSYCH:  mentation appears normal and affect normal/bright    Bilateral Knee exam  Inspection:             minimal swelling right     Patella:      Mobility -       hypomobile right     Tender:      none currently     Non Tender:      remainder of knee area right     Knee ROM:      Flexion 120 degrees right       Extension 0-4 degrees right  - symmetric motion to left     Strength:      SLR intact, 5/5 strength with knee flexion     Special Tests:     neg (-) Jessica's, neg (-) anterior and posterior drawer     Gait:      normal     Lower Extremity Alignment:      Normal     Neurovascular:      2+ peripheral pulses bilaterally and brisk capillary refill       sensation grossly intact    Radiology  I visualized and reviewed these images with the patient  RIGHT KNEE ONE TO TWO VIEWS  4/11/2019 10:32 AM   HISTORY: Knee pain. Closed nondisplaced transverse fracture of right  patella with routine healing.  COMPARISON: 2/25/2019                                                           IMPRESSION: Patellar inferior pole lucency or fracture is becoming  less well seen. Medial and patellofemoral compartment degenerative  changes are noted, unchanged. Small residual joint effusion is  suspected.    Assessment:  1. Closed nondisplaced transverse fracture of right patella with routine healing      Doing well post fracture, could consider repeat x-rays, however, doing well clinically.  Continue work without restrictions.  Continue home exercise program.  Follow up as needed only.    Plan:  - Today's Plan of Care:  Continue to Monitor Symptoms  Letter for work    -We also discussed other future treatment options:  Further X-rays or CT, Further PT    Follow Up: as needed    Concerning signs and symptoms were reviewed.  The patient expressed understanding of this management plan and all questions were answered at this time.    Miroslava Parks MD CAQ  Primary Care Sports Medicine  [facility]

## 2019-06-28 ENCOUNTER — TELEPHONE (OUTPATIENT)
Dept: ORTHOPEDICS | Facility: CLINIC | Age: 64
End: 2019-06-28

## 2019-06-28 NOTE — TELEPHONE ENCOUNTER
Reason for Call:  Form, our goal is to have forms completed with 7 days, however, some forms may require a visit or additional information.    Type of letter, form or note:  MMI form    Who is the form from?: [place]    Where did the form come from: form was faxed in    Phone number of person requesting form: [phone]  Can we leave a detailed message on this number:  unknown    Desired completion date of form: as soon as possible      How will form be returned?:  fax to [phone]    Has the patient signed a consent form for release of information (may be included with form)? YES      Form was started and place in Provider Basket for provider review/ completion at [facility].

## 2019-07-09 NOTE — TELEPHONE ENCOUNTER
Heri Baer [facility] requesting a call back to confirm the HCPR forms have been completed and sent.   Please call # [phone]

## 2019-07-09 NOTE — TELEPHONE ENCOUNTER
OLIVEM jerel Liriano informing him that the HCPR was completed and faxed on on 7/1/19.  Romero Chowdary ATC

## 2019-07-28 DIAGNOSIS — E78.5 HYPERLIPIDEMIA LDL GOAL <160: ICD-10-CM

## 2019-07-29 NOTE — TELEPHONE ENCOUNTER
"Requested Prescriptions   Pending Prescriptions Disp Refills     atorvastatin (LIPITOR) 20 MG tablet [Pharmacy Med Name: ATORVASTATIN TABS 20MG]  Last Written Prescription Date:  6/8/2018  Last Fill Quantity: 90 tablet,  # refills: 3   Last office visit: 6/8/2018 with prescribing provider:  CHANDAN Curry   Future Office Visit:     90 tablet 3     Sig: TAKE 1 TABLET DAILY       Statins Protocol Failed - 7/28/2019 11:39 PM        Failed - LDL on file in past 12 months     Recent Labs   Lab Test 06/08/18  1015   LDL 62             Failed - Recent (12 mo) or future (30 days) visit within the authorizing provider's specialty     Patient had office visit in the last 12 months or has a visit in the next 30 days with authorizing provider or within the authorizing provider's specialty.  See \"Patient Info\" tab in inbasket, or \"Choose Columns\" in Meds & Orders section of the refill encounter.              Passed - No abnormal creatine kinase in past 12 months     No lab results found.             Passed - Medication is active on med list        Passed - Patient is age 18 or older          "

## 2019-07-30 RX ORDER — ATORVASTATIN CALCIUM 20 MG/1
TABLET, FILM COATED ORAL
Qty: 30 TABLET | Refills: 0 | Status: SHIPPED | OUTPATIENT
Start: 2019-07-30 | End: 2019-08-23

## 2019-08-23 ENCOUNTER — OFFICE VISIT (OUTPATIENT)
Dept: FAMILY MEDICINE | Facility: CLINIC | Age: 64
End: 2019-08-23
Payer: COMMERCIAL

## 2019-08-23 VITALS
HEIGHT: 70 IN | DIASTOLIC BLOOD PRESSURE: 76 MMHG | TEMPERATURE: 97.7 F | BODY MASS INDEX: 31.18 KG/M2 | WEIGHT: 217.8 LBS | HEART RATE: 76 BPM | SYSTOLIC BLOOD PRESSURE: 126 MMHG

## 2019-08-23 DIAGNOSIS — E78.5 HYPERLIPIDEMIA LDL GOAL <160: ICD-10-CM

## 2019-08-23 DIAGNOSIS — Z23 NEED FOR SHINGLES VACCINE: ICD-10-CM

## 2019-08-23 DIAGNOSIS — Z00.00 ROUTINE GENERAL MEDICAL EXAMINATION AT A HEALTH CARE FACILITY: Primary | ICD-10-CM

## 2019-08-23 LAB
CHOLEST SERPL-MCNC: 140 MG/DL
GLUCOSE SERPL-MCNC: 90 MG/DL (ref 70–99)
HDLC SERPL-MCNC: 52 MG/DL
LDLC SERPL CALC-MCNC: 51 MG/DL
NONHDLC SERPL-MCNC: 88 MG/DL
PSA SERPL-ACNC: 0.56 UG/L (ref 0–4)
TRIGL SERPL-MCNC: 186 MG/DL
VIT B12 SERPL-MCNC: 460 PG/ML (ref 193–986)

## 2019-08-23 PROCEDURE — 80061 LIPID PANEL: CPT | Performed by: PHYSICIAN ASSISTANT

## 2019-08-23 PROCEDURE — 82947 ASSAY GLUCOSE BLOOD QUANT: CPT | Performed by: PHYSICIAN ASSISTANT

## 2019-08-23 PROCEDURE — 99396 PREV VISIT EST AGE 40-64: CPT | Performed by: PHYSICIAN ASSISTANT

## 2019-08-23 PROCEDURE — G0103 PSA SCREENING: HCPCS | Performed by: PHYSICIAN ASSISTANT

## 2019-08-23 PROCEDURE — 36415 COLL VENOUS BLD VENIPUNCTURE: CPT | Performed by: PHYSICIAN ASSISTANT

## 2019-08-23 PROCEDURE — 82607 VITAMIN B-12: CPT | Performed by: PHYSICIAN ASSISTANT

## 2019-08-23 RX ORDER — ATORVASTATIN CALCIUM 20 MG/1
20 TABLET, FILM COATED ORAL DAILY
Qty: 90 TABLET | Refills: 3 | Status: SHIPPED | OUTPATIENT
Start: 2019-08-23 | End: 2020-07-31

## 2019-08-23 ASSESSMENT — ENCOUNTER SYMPTOMS
DYSURIA: 0
EYE PAIN: 0
DIZZINESS: 0
FEVER: 0
HEARTBURN: 0
NAUSEA: 0
NERVOUS/ANXIOUS: 0
HEMATOCHEZIA: 0
WEAKNESS: 0
PARESTHESIAS: 0
FREQUENCY: 0
ABDOMINAL PAIN: 0
CONSTIPATION: 0
CHILLS: 0
JOINT SWELLING: 0
ARTHRALGIAS: 0
SORE THROAT: 0
DIARRHEA: 0
PALPITATIONS: 0
HEMATURIA: 0
COUGH: 0
MYALGIAS: 0
HEADACHES: 0
SHORTNESS OF BREATH: 0

## 2019-08-23 ASSESSMENT — MIFFLIN-ST. JEOR: SCORE: 1781.24

## 2019-08-23 NOTE — PROGRESS NOTES
SUBJECTIVE:   CC: Mina Ames is an 64 year old male who presents for preventative health visit.     Healthy Habits:     Getting at least 3 servings of Calcium per day:  Yes    Bi-annual eye exam:  Yes    Dental care twice a year:  Yes    Sleep apnea or symptoms of sleep apnea:  None    Diet:  Regular (no restrictions)    Frequency of exercise:  6-7 days/week    Duration of exercise:  30-45 minutes    Taking medications regularly:  Yes    Medication side effects:  None    PHQ-2 Total Score: 0    Additional concerns today:  No    Today's PHQ-2 Score:   PHQ-2 ( 1999 [employer]) 8/23/2019   Q1: Little interest or pleasure in doing things 0   Q2: Feeling down, depressed or hopeless 0   PHQ-2 Score 0   Q1: Little interest or pleasure in doing things Not at all   Q2: Feeling down, depressed or hopeless Not at all   PHQ-2 Score 0       Abuse: Current or Past(Physical, Sexual or Emotional)- No   Do you feel safe in your environment? Yes    Social History     Tobacco Use     Smoking status: Never Smoker     Smokeless tobacco: Never Used   Substance Use Topics     Alcohol use: No       Alcohol Use 8/23/2019   Prescreen: >3 drinks/day or >7 drinks/week? No   Prescreen: >3 drinks/day or >7 drinks/week? -       Last PSA:   PSA   Date Value Ref Range Status   06/08/2018 0.80 0 - 4 ug/L Final     Comment:     Assay Method:  Chemiluminescence using Siemens Vista analyzer       Reviewed orders with patient. Reviewed health maintenance and updated orders accordingly - Yes  Lab work is in process    Reviewed and updated as needed this visit by clinical staff  Tobacco  Allergies  Meds  Med Hx  Surg Hx  Fam Hx  Soc Hx        Reviewed and updated as needed this visit by Provider            Review of Systems   Constitutional: Negative for chills and fever.   HENT: Negative for congestion, ear pain, hearing loss and sore throat.    Eyes: Negative for pain and visual disturbance.   Respiratory: Negative for cough and shortness of  "breath.    Cardiovascular: Negative for chest pain, palpitations and peripheral edema.   Gastrointestinal: Negative for abdominal pain, constipation, diarrhea, heartburn, hematochezia and nausea.   Genitourinary: Negative for discharge, dysuria, frequency, genital sores, hematuria, impotence and urgency.   Musculoskeletal: Negative for arthralgias, joint swelling and myalgias.   Skin: Negative for rash.   Neurological: Negative for dizziness, weakness, headaches and paresthesias.   Psychiatric/Behavioral: Negative for mood changes. The patient is not nervous/anxious.      CONSTITUTIONAL: NEGATIVE for fever, chills, change in weight  INTEGUMENTARY/SKIN: NEGATIVE for worrisome rashes, moles or lesions  EYES: NEGATIVE for vision changes or irritation  ENT: NEGATIVE for ear, mouth and throat problems  RESP: NEGATIVE for significant cough or SOB  CV: NEGATIVE for chest pain, palpitations or peripheral edema  GI: NEGATIVE for nausea, abdominal pain, heartburn, or change in bowel habits   male: negative for dysuria, hematuria, decreased urinary stream, erectile dysfunction, urethral discharge  MUSCULOSKELETAL: NEGATIVE for significant arthralgias or myalgia  NEURO: NEGATIVE for weakness, dizziness or paresthesias  PSYCHIATRIC: NEGATIVE for changes in mood or affect    OBJECTIVE:   /76 (BP Location: Right arm, Patient Position: Chair, Cuff Size: Adult Large)   Pulse 76   Temp 97.7  F (36.5  C) (Oral)   Ht 1.765 m (5' 9.5\")   Wt 98.8 kg (217 lb 12.8 oz)   BMI 31.70 kg/m      Physical Exam  GENERAL: healthy, alert and no distress  EYES: Eyes grossly normal to inspection, PERRL and conjunctivae and sclerae normal  HENT: ear canals and TM's normal, nose and mouth without ulcers or lesions  NECK: no adenopathy, no asymmetry, masses, or scars and thyroid normal to palpation  RESP: lungs clear to auscultation - no rales, rhonchi or wheezes  CV: regular rate and rhythm, normal S1 S2, no S3 or S4, no murmur, click or " "rub, no peripheral edema and peripheral pulses strong  ABDOMEN: soft, nontender, no hepatosplenomegaly, no masses and bowel sounds normal  MS: no gross musculoskeletal defects noted, no edema  SKIN: no suspicious lesions or rashes  NEURO: Normal strength and tone, mentation intact and speech normal  PSYCH: mentation appears normal, affect normal/bright  LYMPH: no cervical, supraclavicular, axillary, or inguinal adenopathy    Diagnostic Test Results:  Labs reviewed in Epic    ASSESSMENT/PLAN:   (Z00.00) Routine general medical examination at a health care facility  (primary encounter diagnosis)  Comment: Well person   Plan: Lipid panel reflex to direct LDL Fasting,         Glucose, Prostate spec antigen screen, Vitamin         B12          (E78.5) Hyperlipidemia LDL goal <160  Comment:   Plan: atorvastatin (LIPITOR) 20 MG tablet        Refills     (Z23) Need for shingles vaccine  Comment:   Plan: zoster vaccine recombinant adjuvanted         (SHINGRIX) injection        Will get at pharmacy - checking on cost / coverage       COUNSELING:   Reviewed preventive health counseling, as reflected in patient instructions    Estimated body mass index is 31.7 kg/m  as calculated from the following:    Height as of this encounter: 1.765 m (5' 9.5\").    Weight as of this encounter: 98.8 kg (217 lb 12.8 oz).      reports that he has never smoked. He has never used smokeless tobacco.      Counseling Resources:  ATP IV Guidelines  Pooled Cohorts Equation Calculator  FRAX Risk Assessment  ICSI Preventive Guidelines  Dietary Guidelines for Americans, 2010  USDA's MyPlate  ASA Prophylaxis  Lung CA Screening    BJORN KNUTSON PA-C  [facility]  "

## 2019-09-27 ENCOUNTER — HEALTH MAINTENANCE LETTER (OUTPATIENT)
Age: 64
End: 2019-09-27

## 2019-10-28 PROBLEM — M25.561 ACUTE PAIN OF RIGHT KNEE: Status: RESOLVED | Noted: 2019-02-27 | Resolved: 2019-10-28

## 2019-10-29 ENCOUNTER — IMMUNIZATION (OUTPATIENT)
Dept: NURSING | Facility: CLINIC | Age: 64
End: 2019-10-29
Payer: COMMERCIAL

## 2019-10-29 DIAGNOSIS — Z23 NEED FOR PROPHYLACTIC VACCINATION AND INOCULATION AGAINST INFLUENZA: Primary | ICD-10-CM

## 2019-10-29 PROCEDURE — 90471 IMMUNIZATION ADMIN: CPT

## 2019-10-29 PROCEDURE — 90682 RIV4 VACC RECOMBINANT DNA IM: CPT

## 2019-10-29 PROCEDURE — 99207 ZZC NO CHARGE NURSE ONLY: CPT

## 2020-01-05 ENCOUNTER — TRANSFERRED RECORDS (OUTPATIENT)
Dept: HEALTH INFORMATION MANAGEMENT | Facility: CLINIC | Age: 65
End: 2020-01-05

## 2020-01-07 ENCOUNTER — OFFICE VISIT (OUTPATIENT)
Dept: FAMILY MEDICINE | Facility: CLINIC | Age: 65
End: 2020-01-07
Payer: COMMERCIAL

## 2020-01-07 VITALS
OXYGEN SATURATION: 97 % | SYSTOLIC BLOOD PRESSURE: 128 MMHG | WEIGHT: 216.4 LBS | HEIGHT: 70 IN | BODY MASS INDEX: 30.98 KG/M2 | DIASTOLIC BLOOD PRESSURE: 80 MMHG | TEMPERATURE: 97.5 F | HEART RATE: 63 BPM

## 2020-01-07 DIAGNOSIS — R55 SYNCOPE, UNSPECIFIED SYNCOPE TYPE: Primary | ICD-10-CM

## 2020-01-07 DIAGNOSIS — R07.81 RIB PAIN: ICD-10-CM

## 2020-01-07 PROCEDURE — 99214 OFFICE O/P EST MOD 30 MIN: CPT | Performed by: FAMILY MEDICINE

## 2020-01-07 RX ORDER — LIDOCAINE 50 MG/G
OINTMENT TOPICAL PRN
Qty: 50 G | Refills: 1 | Status: SHIPPED | OUTPATIENT
Start: 2020-01-07 | End: 2020-01-21

## 2020-01-07 RX ORDER — HYDROCODONE BITARTRATE AND ACETAMINOPHEN 5; 325 MG/1; MG/1
1-2 TABLET ORAL
COMMUNITY
Start: 2020-01-05 | End: 2020-01-21

## 2020-01-07 RX ORDER — TRAMADOL HYDROCHLORIDE 50 MG/1
50-100 TABLET ORAL EVERY 6 HOURS PRN
Qty: 20 TABLET | Refills: 0 | Status: SHIPPED | OUTPATIENT
Start: 2020-01-07 | End: 2020-01-10

## 2020-01-07 ASSESSMENT — MIFFLIN-ST. JEOR: SCORE: 1774.89

## 2020-01-07 NOTE — PROGRESS NOTES
Subjective     Mina Ames is a 64 year old male who presents to clinic today for the following health issues:    HPI     ED/UC Followup:    Facility:  [facility]  Date of visit: 1/5/2020  Reason for visit: Chest wall pain/ Syncope  Current Status: Still sore- Norco makes him tired - wondering if he fractured something? Rib hurts    ER summary:  Mina Ames is a 64 y.o. male who presents to the emergency department for evaluation of left sided chest wall pain after a syncopal episode that occurred yesterday. He has reproducible tenderness to the lower chest wall and evidence of what appears likely to be a rib fracture. This was not visualized on chest X-ray, however, and there is no evidence of pneumothorax, hemothorax, or other complication. He has no abdominal tenderness. I think from a trauma perspective that he is clear for discharge with symptomatic treatment. I discussed the likelihood of occult nondisplaced rib fracture with him.    He did have an apparent syncopal episode which led to this injury. He is now more than 24 hours out from this and has had no further symptoms. I discussed this with him and we did do a syncope work up including EKG and blood testing and that was all benign. He was advised for the need for primary care follow-up in this regard. He is comfortable with this plan. He has no additional concerns and is subsequently discharged with analgesics.        Patient has been feeling better overall since ER visit.  No further syncopal episodes.  His left ribs continue to be very painful.  Norco hasn't been helping.      Patient Active Problem List   Diagnosis     Hyperlipidemia     Adult BMI 30+     DJD (degenerative joint disease) of knee     HYPERLIPIDEMIA LDL GOAL <160     Past Surgical History:   Procedure Laterality Date     C APPENDECTOMY  1971     COLONOSCOPY  2016     SURGICAL HISTORY OF -   1995    wrist       Social History     Tobacco Use     Smoking status:  "Never Smoker     Smokeless tobacco: Never Used   Substance Use Topics     Alcohol use: No     Family History   Problem Relation Age of Onset     Hypertension Mother      Heart Disease Father      Hypertension Father      Lipids Father      Depression Father      Coronary Artery Disease Father         Bypass surgery - 3            Review of Systems   ROS COMP: Constitutional, HEENT, cardiovascular, pulmonary, gi and gu systems are negative, except as otherwise noted.      Objective    /80 (BP Location: Right arm, Patient Position: Sitting, Cuff Size: Adult Large)   Pulse 63   Temp 97.5  F (36.4  C) (Oral)   Ht 1.765 m (5' 9.5\")   Wt 98.2 kg (216 lb 6.4 oz)   SpO2 97%   BMI 31.50 kg/m    Body mass index is 31.5 kg/m .  Physical Exam   GENERAL: healthy, alert and no distress  EYES: Eyes grossly normal to inspection, PERRL and conjunctivae and sclerae normal  HENT: ear canals and TM's normal, nose and mouth without ulcers or lesions  NECK: no adenopathy, no asymmetry, masses, or scars, thyroid normal to palpation and no carotid bruits  RESP: lungs clear to auscultation - no rales, rhonchi or wheezes  CV: regular rates and rhythm, normal S1 S2, no S3 or S4, no murmur, click or rub and no peripheral edema  ABDOMEN: soft, nontender, no hepatosplenomegaly, no masses and bowel sounds normal  MS: +TTP along left lateral lower ribs   NEURO: Normal strength and tone, mentation intact and speech normal    Diagnostic Test Results:  Labs and imaging from ER reviewed in Epic        Assessment & Plan     1. Syncope, unspecified syncope type  - history sounds consistent with an orthostatic/vasovagal episode  - Will get carotid and cardiac US to rule out other causes  - Labs and imaging in the ER were WNL  - US Carotid Bilateral; Future  - Echocardiogram Complete; Future    2. Rib pain  - Stop Norco and switch to tramadol  - Also try adding lidocaine ointment  - Discussed that rib injuries tend to take quite awhile to heal " completely   - lidocaine (XYLOCAINE) 5 % external ointment; Apply topically as needed for moderate pain  Dispense: 50 g; Refill: 1  - traMADol (ULTRAM) 50 MG tablet; Take 1-2 tablets ( mg) by mouth every 6 hours as needed for severe pain  Dispense: 20 tablet; Refill: 0       Return in about 2 weeks (around 1/21/2020).    Mamta Mcguire DO  [facility]

## 2020-01-07 NOTE — LETTER
[address] [address]  Phone: [phone]    January 7, 2020        Mina Ames  [address] [place] [address]          To whom it may concern:    RE: Mina Ames    Patient was seen and treated today at our clinic.  He sustained a significant rib injury recently and will be unable to drive or lift anything due to his injury.  Please allow him to be completely off work for the next 2 weeks.      Please contact me for questions or concerns.      Sincerely,          Mamta Mcguire, DO

## 2020-01-10 ENCOUNTER — ANCILLARY PROCEDURE (OUTPATIENT)
Dept: ULTRASOUND IMAGING | Facility: CLINIC | Age: 65
End: 2020-01-10
Attending: FAMILY MEDICINE
Payer: COMMERCIAL

## 2020-01-10 ENCOUNTER — ANCILLARY PROCEDURE (OUTPATIENT)
Dept: CARDIOLOGY | Facility: CLINIC | Age: 65
End: 2020-01-10
Attending: FAMILY MEDICINE
Payer: COMMERCIAL

## 2020-01-10 DIAGNOSIS — R55 SYNCOPE, UNSPECIFIED SYNCOPE TYPE: ICD-10-CM

## 2020-01-10 PROCEDURE — 93880 EXTRACRANIAL BILAT STUDY: CPT

## 2020-01-10 PROCEDURE — 93306 TTE W/DOPPLER COMPLETE: CPT | Performed by: INTERNAL MEDICINE

## 2020-01-10 PROCEDURE — 40000264 ZZHC STATISTIC IV PUSH SINGLE INITIAL SUBSTANCE: Performed by: INTERNAL MEDICINE

## 2020-01-10 RX ADMIN — Medication 3 ML: at 14:15

## 2020-01-21 ENCOUNTER — OFFICE VISIT (OUTPATIENT)
Dept: FAMILY MEDICINE | Facility: CLINIC | Age: 65
End: 2020-01-21
Payer: COMMERCIAL

## 2020-01-21 VITALS
HEART RATE: 76 BPM | WEIGHT: 220 LBS | HEIGHT: 70 IN | TEMPERATURE: 97.4 F | DIASTOLIC BLOOD PRESSURE: 70 MMHG | BODY MASS INDEX: 31.5 KG/M2 | SYSTOLIC BLOOD PRESSURE: 126 MMHG | OXYGEN SATURATION: 95 %

## 2020-01-21 DIAGNOSIS — R07.81 RIB PAIN: Primary | ICD-10-CM

## 2020-01-21 DIAGNOSIS — R55 VASOVAGAL SYNCOPE: ICD-10-CM

## 2020-01-21 PROCEDURE — 99213 OFFICE O/P EST LOW 20 MIN: CPT | Performed by: FAMILY MEDICINE

## 2020-01-21 RX ORDER — LIDOCAINE 50 MG/G
OINTMENT TOPICAL PRN
Qty: 50 G | Refills: 1 | Status: SHIPPED | OUTPATIENT
Start: 2020-01-21 | End: 2023-03-29

## 2020-01-21 ASSESSMENT — MIFFLIN-ST. JEOR: SCORE: 1791.22

## 2020-01-21 ASSESSMENT — PAIN SCALES - GENERAL: PAINLEVEL: MILD PAIN (3)

## 2020-01-21 NOTE — PROGRESS NOTES
"Subjective     Mina Ames is a 64 year old male who presents to clinic today for the following health issues:    HPI     Follow-up from last visit- Rib Pain- patient states he is doing much better.  He was seen in the ER on 1/5 after having a syncopal episode and landing on his left side.  No fractures noted on XR, but he was having significant pain in his ribs when I saw him for ER follow up on 1/7.  Treating rib pain with lidocaine and tramadol.      Since our last visit he had a normal cardiac echo and carotid US.  His syncopal episode was most likely vasovagal.      Also has short term disability forms to be filled out for work    Patient Active Problem List   Diagnosis     Hyperlipidemia     Adult BMI 30+     DJD (degenerative joint disease) of knee     HYPERLIPIDEMIA LDL GOAL <160     Past Surgical History:   Procedure Laterality Date     C APPENDECTOMY  1971     COLONOSCOPY  2016     SURGICAL HISTORY OF -   1995    wrist       Social History     Tobacco Use     Smoking status: Never Smoker     Smokeless tobacco: Never Used   Substance Use Topics     Alcohol use: No     Family History   Problem Relation Age of Onset     Hypertension Mother      Heart Disease Father      Hypertension Father      Lipids Father      Depression Father      Coronary Artery Disease Father         Bypass surgery - 3            Review of Systems   ROS COMP: Constitutional, HEENT, cardiovascular, pulmonary, gi and gu systems are negative, except as otherwise noted.      Objective    /70 (BP Location: Right arm, Patient Position: Sitting, Cuff Size: Adult Large)   Pulse 76   Temp 97.4  F (36.3  C) (Oral)   Ht 1.765 m (5' 9.5\")   Wt 99.8 kg (220 lb)   SpO2 95%   BMI 32.02 kg/m    Body mass index is 32.02 kg/m .  Physical Exam   GENERAL: healthy, alert and no distress  RESP: lungs clear to auscultation - no rales, rhonchi or wheezes  CV: regular rates and rhythm, normal S1 S2, no S3 or S4 and no murmur, click or rub  MS: " Mild pain and swelling along left ribs 5-8 laterally           Assessment & Plan     1. 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  Ok to return sooner if he's feeling 100% back to normal before then   - No need for further narcotics at this point  - Continue NSAIDs and topical lidocaine   - lidocaine (XYLOCAINE) 5 % external ointment; Apply topically as needed for moderate pain  Dispense: 50 g; Refill: 1    2. Vasovagal syncope  - ER workup was WNL  - Outpt cardiac echo and carotid US normal as well  - Syncope was most likely vasovagal       Return in about 22 days (around 2/12/2020).    Mamta Mcguire DO  [facility]

## 2020-01-21 NOTE — LETTER
[facility]  [address] [facility] [address]  Phone: [phone]    January 21, 2020        Mina Ames  [address]  [place] [place] [facility] [address]          To whom it may concern:    RE: Mina Ames    Patient was seen and treated today at our clinic to follow up regarding a rib injury that occurred on 1/5/20.  Short term disability paperwork was  Faxed today.  He should remain off work from 1/5/20-2/16/20.  He may return to work on 2/17/20 as long as his symptoms are resolved.  He will follow up with me on 2/12/20 for a recheck.      Please contact me for questions or concerns.      Sincerely,          Mamta Mcguire, DO

## 2020-02-12 ENCOUNTER — OFFICE VISIT (OUTPATIENT)
Dept: FAMILY MEDICINE | Facility: CLINIC | Age: 65
End: 2020-02-12
Payer: COMMERCIAL

## 2020-02-12 VITALS
WEIGHT: 221 LBS | TEMPERATURE: 97.3 F | OXYGEN SATURATION: 98 % | DIASTOLIC BLOOD PRESSURE: 89 MMHG | BODY MASS INDEX: 31.64 KG/M2 | HEART RATE: 74 BPM | HEIGHT: 70 IN | SYSTOLIC BLOOD PRESSURE: 131 MMHG

## 2020-02-12 DIAGNOSIS — R55 VASOVAGAL SYNCOPE: ICD-10-CM

## 2020-02-12 DIAGNOSIS — R07.81 RIB PAIN ON LEFT SIDE: Primary | ICD-10-CM

## 2020-02-12 PROCEDURE — 99213 OFFICE O/P EST LOW 20 MIN: CPT | Performed by: FAMILY MEDICINE

## 2020-02-12 ASSESSMENT — MIFFLIN-ST. JEOR: SCORE: 1795.76

## 2020-02-12 ASSESSMENT — PAIN SCALES - GENERAL: PAINLEVEL: MILD PAIN (2)

## 2020-02-12 NOTE — LETTER
[address] [address]  Phone: [phone]    February 12, 2020        Mina Ames  [address] [place] [address]          To whom it may concern:    RE: Mina Ames    Patient was seen and treated today at our clinic.  He has made a full recovery and should be able to return to work full duty on 2/17/20.      Please contact me for questions or concerns.      Sincerely,          Mamta Mcguire, DO

## 2020-02-12 NOTE — PROGRESS NOTES
"Subjective     Mina Ames is a 64 year old male who presents to clinic today for the following health issues:    HPI   Pt is here to recheck on his rib pain. Pt stated that he is doing a lot better. Pt is reporting 2/10 for pain.    Patient was seen in the ER on 1/5 after having a syncopal episode that caused him to land on his left side.  He did have XRs and no rib fractures were noted, however I was concerned about possible unseen fractures given his level of pain on follow up visit.  He was treating the pain with NSAIDs, topical lidocaine and tramadol, but has not required the tramadol for the past few weeks.      He had a normal cardiac echo and carotid US.  His syncopal episode was most likely vasovagal.      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  Goal return to work day is 2/17/20, but we discussed that he could return sooner if his symptoms improved significantly before then.      Review of Systems   ROS COMP: Constitutional, HEENT, cardiovascular, pulmonary, gi and gu systems are negative, except as otherwise noted.      Objective    /89 (BP Location: Right arm, Patient Position: Chair, Cuff Size: Adult Regular)   Pulse 74   Temp 97.3  F (36.3  C) (Oral)   Ht 1.765 m (5' 9.5\")   Wt 100.2 kg (221 lb)   SpO2 98%   BMI 32.17 kg/m    Body mass index is 32.17 kg/m .  Physical Exam   GENERAL: healthy, alert and no distress  RESP: lungs clear to auscultation - no rales, rhonchi or wheezes  CV: regular rates and rhythm, normal S1 S2, no S3 or S4 and no murmur, click or rub  MS: Mild TTP along left lateral ribs.  Still some signs of rib inhalation but no other deformities or bony stepoffs concerning for fracture.          Assessment & Plan       ICD-10-CM    1. Rib pain on left side R07.81    2. Vasovagal syncope R55      Episode of vasovagal syncope on 1/5 causing fall with injury to left ribs.  No fracture " on XR, but symptoms consistent with a possible small fracture.  Has been off work now for 6 weeks and pain is almost completely resolved.  Healing should be complete by now.  Ok to return to work on 2/17 as planned and pt was given a letter as such.      Return in about 6 months (around 8/12/2020) for Physical Exam.    Mamta Mcguire,   [facility]

## 2020-10-26 ENCOUNTER — OFFICE VISIT (OUTPATIENT)
Dept: FAMILY MEDICINE | Facility: CLINIC | Age: 65
End: 2020-10-26
Payer: COMMERCIAL

## 2020-10-26 VITALS
SYSTOLIC BLOOD PRESSURE: 122 MMHG | TEMPERATURE: 96.6 F | HEART RATE: 101 BPM | DIASTOLIC BLOOD PRESSURE: 84 MMHG | OXYGEN SATURATION: 98 % | WEIGHT: 221.8 LBS | HEIGHT: 70 IN | BODY MASS INDEX: 31.75 KG/M2

## 2020-10-26 DIAGNOSIS — E78.5 HYPERLIPIDEMIA LDL GOAL <160: ICD-10-CM

## 2020-10-26 DIAGNOSIS — M25.552 HIP PAIN, LEFT: Primary | ICD-10-CM

## 2020-10-26 DIAGNOSIS — Z23 NEED FOR PROPHYLACTIC VACCINATION AND INOCULATION AGAINST INFLUENZA: ICD-10-CM

## 2020-10-26 PROCEDURE — 99213 OFFICE O/P EST LOW 20 MIN: CPT | Mod: 25 | Performed by: PHYSICIAN ASSISTANT

## 2020-10-26 PROCEDURE — 90682 RIV4 VACC RECOMBINANT DNA IM: CPT | Performed by: PHYSICIAN ASSISTANT

## 2020-10-26 PROCEDURE — 80061 LIPID PANEL: CPT | Performed by: PHYSICIAN ASSISTANT

## 2020-10-26 PROCEDURE — 36415 COLL VENOUS BLD VENIPUNCTURE: CPT | Performed by: PHYSICIAN ASSISTANT

## 2020-10-26 PROCEDURE — 90471 IMMUNIZATION ADMIN: CPT | Performed by: PHYSICIAN ASSISTANT

## 2020-10-26 RX ORDER — ATORVASTATIN CALCIUM 20 MG/1
20 TABLET, FILM COATED ORAL DAILY
Qty: 90 TABLET | Refills: 3 | Status: SHIPPED | OUTPATIENT
Start: 2020-10-26 | End: 2021-02-15

## 2020-10-26 ASSESSMENT — PAIN SCALES - GENERAL: PAINLEVEL: MILD PAIN (2)

## 2020-10-26 ASSESSMENT — MIFFLIN-ST. JEOR: SCORE: 1794.39

## 2020-10-26 NOTE — PROGRESS NOTES
"Subjective     Mina Ames is a 65 year old male who presents to clinic today for the following health issues:    HPI         Musculoskeletal problem/pain  Onset/Duration: patient was working out on a pedal machine and maybe injured himself, he did try working out again this morning and it has improved.   Description  Location: Hip - left, radiated a bit to low back and down left leg  Joint Swelling: no  Redness: no  Pain: dull ache  Warmth: YES  Intensity:  mild  Progression of Symptoms:  improving  Accompanying signs and symptoms:   Fevers: no  Numbness/tingling/weakness: no  History  Trauma to the area: no  Recent illness:  no  Previous similar problem: no  Previous evaluation:  no  Precipitating or alleviating factors:  Aggravating factors include: sitting prolonged time and climbing stairs  Therapies tried and outcome: heat, ice and Ibuprofen helped some    Has largely resolved  Mostly left low back radiated into the left anterior thigh  Denies left leg weakness / tingling / numbness, etc.    Patient Active Problem List   Diagnosis     Hyperlipidemia     Adult BMI 30+     DJD (degenerative joint disease) of knee     HYPERLIPIDEMIA LDL GOAL <160        Review of Systems   Constitutional, HEENT, cardiovascular, pulmonary, gi and gu systems are negative, except as otherwise noted.      Objective    /84 (BP Location: Right arm, Patient Position: Chair, Cuff Size: Adult Large)   Pulse 101   Temp 96.6  F (35.9  C) (Tympanic)   Ht 1.765 m (5' 9.5\")   Wt 100.6 kg (221 lb 12.8 oz)   SpO2 98%   BMI 32.28 kg/m    Body mass index is 32.28 kg/m .  Physical Exam   GENERAL: healthy, alert and no distress  MS: Left hip ROM is intact, normal knee and leg exam. Left low back non tender, ROM is intact.  No bony tenderness or deformity           Assessment & Plan     Hip pain, left  Resolving. He wants to monitor. Warning symptoms of worsening condition discussed and patient shows good understanding. Home " "therapies. NSAID as needed    Hyperlipidemia LDL goal <160  Lab Results   Component Value Date    LDL 51 08/23/2019      Refills.   Recheck labs today.   - atorvastatin (LIPITOR) 20 MG tablet; Take 1 tablet (20 mg) by mouth daily  - Lipid panel reflex to direct LDL Fasting    Need for prophylactic vaccination and inoculation against influenza  Given   - INFLUENZA QUAD, RECOMBINANT, P-FREE (RIV4) (FLUBLOCK) [00595]     BMI:   Estimated body mass index is 32.28 kg/m  as calculated from the following:    Height as of this encounter: 1.765 m (5' 9.5\").    Weight as of this encounter: 100.6 kg (221 lb 12.8 oz).            No follow-ups on file.    BJORN KNUTSON PA-C  [facility]    "

## 2020-10-26 NOTE — PATIENT INSTRUCTIONS
1. Try some back extension exercises, 5-10 sets, 5 minutes or so, 3 times a day   2. Ice or heat the left lower back for a few days

## 2020-10-27 LAB
CHOLEST SERPL-MCNC: 159 MG/DL
HDLC SERPL-MCNC: 55 MG/DL
LDLC SERPL CALC-MCNC: 64 MG/DL
NONHDLC SERPL-MCNC: 104 MG/DL
TRIGL SERPL-MCNC: 198 MG/DL

## 2020-11-17 ENCOUNTER — OFFICE VISIT (OUTPATIENT)
Dept: ORTHOPEDICS | Facility: CLINIC | Age: 65
End: 2020-11-17
Payer: COMMERCIAL

## 2020-11-17 ENCOUNTER — ANCILLARY PROCEDURE (OUTPATIENT)
Dept: GENERAL RADIOLOGY | Facility: CLINIC | Age: 65
End: 2020-11-17
Attending: PEDIATRICS
Payer: COMMERCIAL

## 2020-11-17 VITALS
SYSTOLIC BLOOD PRESSURE: 142 MMHG | DIASTOLIC BLOOD PRESSURE: 80 MMHG | WEIGHT: 225 LBS | HEIGHT: 68 IN | BODY MASS INDEX: 34.1 KG/M2

## 2020-11-17 DIAGNOSIS — M54.42 ACUTE LEFT-SIDED LOW BACK PAIN WITH LEFT-SIDED SCIATICA: Primary | ICD-10-CM

## 2020-11-17 DIAGNOSIS — M54.42 ACUTE LEFT-SIDED LOW BACK PAIN WITH LEFT-SIDED SCIATICA: ICD-10-CM

## 2020-11-17 PROCEDURE — 72100 X-RAY EXAM L-S SPINE 2/3 VWS: CPT | Performed by: RADIOLOGY

## 2020-11-17 PROCEDURE — 99214 OFFICE O/P EST MOD 30 MIN: CPT | Performed by: PEDIATRICS

## 2020-11-17 ASSESSMENT — MIFFLIN-ST. JEOR: SCORE: 1785.09

## 2020-11-17 NOTE — PROGRESS NOTES
Sports Medicine Clinic Visit    PCP: Escobar Curry CLAYTON Ames is a 65 year old male who is seen  as a self referral presenting with left left pain.  Pain has been increasing over the past 3 weeks.  Pain after use of a seated cardio machine.  Pain in the posterior left hip and down his leg to his knee.  Denies any numbness or tingling.   Pain with walking, standing, movement of his left leg   Does have some pain that will travel across his back.  Slight groin pain.      Injury: use of seated cardio machine   **  + pain in left thigh currently. Starts low back, goes through left hip area; to level of knee.  No radiating symptoms beyond knee. No N/T.        Location of Pain: left low back and leg   Duration of Pain: 3+ weeks  Rating of Pain at worst: 5/10  Rating of Pain Currently: 5/10  Symptoms are better with: Heat  Symptoms are worse with: standing, walking, movement   Additional Features:   Positive:    Negative: swelling, bruising, popping, grinding, catching, locking, instability, paresthesias, numbness, weakness, pain in other joints and systemic symptoms  Other evaluation and/or treatments so far consists of: Ibuprofen and Other medications: topical analgesics  Prior History of related problems: denies for left leg     Social History:  for hotel, requires lifting     Mina was asked to complete the Oswestry Low Back Disability Index.  Disability score: 16%.     Review of Systems  Musculoskeletal: as above  Remainder of review of systems is negative including constitutional, CV, pulmonary, GI, Skin and Neurologic except as noted in HPI or medical history.    Past Medical History:   Diagnosis Date     Adult BMI 30+      DJD (degenerative joint disease) of knee     xray Oct., 2009     Hyperlipidemia      Squamous cell skin cancer 2007    chest     Past Surgical History:   Procedure Laterality Date     C APPENDECTOMY  1971     COLONOSCOPY  2016     SURGICAL HISTORY OF -   1995    wrist  "    Family History   Problem Relation Age of Onset     Hypertension Mother      Heart Disease Father      Hypertension Father      Lipids Father      Depression Father      Coronary Artery Disease Father         Bypass surgery - 3      Social History     Socioeconomic History     Marital status:      Spouse name: Not on file     Number of children: Not on file     Years of education: Not on file     Highest education level: Not on file   Occupational History     Not on file   Social Needs     Financial resource strain: Not on file     Food insecurity     Worry: Not on file     Inability: Not on file     Transportation needs     Medical: Not on file     Non-medical: Not on file   Tobacco Use     Smoking status: Never Smoker     Smokeless tobacco: Never Used   Substance and Sexual Activity     Alcohol use: No     Drug use: No     Sexual activity: Not Currently     Partners: Female     Comment: defer to md   Lifestyle     Physical activity     Days per week: Not on file     Minutes per session: Not on file     Stress: Not on file   Relationships     Social connections     Talks on phone: Not on file     Gets together: Not on file     Attends [employer] service: Not on file     Active member of club or organization: Not on file     Attends meetings of clubs or organizations: Not on file     Relationship status: Not on file     Intimate partner violence     Fear of current or ex partner: Not on file     Emotionally abused: Not on file     Physically abused: Not on file     Forced sexual activity: Not on file   Other Topics Concern     Parent/sibling w/ CABG, MI or angioplasty before 65F 55M? No   Social History Narrative     Not on file       Objective  BP (!) 142/80   Ht 1.727 m (5' 8\")   Wt 102.1 kg (225 lb)   BMI 34.21 kg/m      GENERAL APPEARANCE: healthy, alert and no distress   GAIT: ambulates independently  SKIN: no suspicious lesions or rashes  NEURO: Normal strength and tone, mentation intact and speech " normal  PSYCH:  mentation appears normal and affect normal/bright  HEENT: no scleral icterus  CV: distal perfusion intact  RESP: nonlabored breathing      Low back exam:    Inspection:       no visible deformity in the low back       normal skin       normal vascular       normal lymphatic    ROM:       Flexion hands to ankles, no change  Extension with some pain reproduced    Tender:       paraspinal muscles       SI joint   left    Non Tender:       remainder of lumbar spine    Strength:       hip flexion 5/5       knee extension 5/5       ankle dorsiflexion 5/5       ankle plantarflexion 5/5       dorsiflexion of the great toe 5/5    Reflexes:       patellar (L3, L4) symmetric normal       achilles tendons (S1) symmetric normal    Sensation:      grossly intact throughout lower extremities    Skin:       well perfused       capillary refill brisk    Special tests:       straight leg raise left some left posterior hip and lateral hip pain, mild radiation into the thigh       straight leg raise right negative       No significant change with ALIYA       slump test left posterior lateral hip pain, mild radiation into the thigh        Left hip exam    Inspection:      no edema or ecchymosis in hip area    ROM:     Grossly symmetric active and passive ROM     Strength:      flexion        abduction        adduction   Grossly intact, some mild pain laterally with abduction    Tender:      TFL  Along lateral hip, lateral thigh, course of IT band    Non Tender:      remainder of hip area    Sensation:      grossly intact in hip and thigh    Special Tests:      neg (-) ALIYA       Lateral pain with FADIR       Lateral pain with Hortensia       Log roll neg          Radiology:  Visualized radiographs as noted below, and reviewed the images with the patient; if the report was available at the time of the visit, the report was reviewed as well.  Degenerative change, nothing acute.    Recent Results (from the past 24 hour(s))   XR  Lumbar Spine 2/3 Views    Narrative    LUMBAR SPINE TWO-THREE  VIEWS  11/17/2020 10:55 AM     HISTORY: Acute left-sided low back pain with left-sided sciatica    COMPARISON: None.    FINDINGS: 5 lumbar type vertebrae. Mild curvature of the lumbar spine  convex towards the left. Loss of disc space height at L2-3, L3-4, and  L4-5. Degenerative change in the facet joints..      Impression    IMPRESSION: Multilevel degenerative changes.    LEONARD TREVINO MD         Assessment:  1. Acute left-sided low back pain with left-sided sciatica        Plan:  Discussed the assessment with the patient.  X-rays show some facet arthrosis, degenerative disc disease.  Also with tenderness over the SI joint, but negative Anmol.  Also, tenderness along the lateral thigh, along course of IT band.  See patient instructions.  We primarily discussed consideration of physical therapy next, versus additional imaging with MRI.  Start with therapy, referral placed.  Follow up: 3 to 4 weeks if not improving, sooner if needed.  May check in virtually instead of in person, if ongoing issues. Likely MRI lumbar if ongoing issues, vs consider injection therapy for SI joint.  Questions answered. Discussed signs and symptoms that may indicate more serious issues; the patient was instructed to seek appropriate care if noted. Mina indicates understanding of these issues and agrees with the plan.      Geronimo Gomes, DO, CAQ            Patient Instructions   Mina to follow up with Primary Care provider regarding elevated blood pressure.    Discussed potential sources of pain, including in the low back there is some facet joint degenerative change, also some tenderness over the sacroiliac joint; there is also tenderness and some discomfort along the course of the left IT band.  Icing, heat, over-the-counter medication as needed for soreness.  Plan physical therapy next, referral placed.  Work on PT exercises over the next 3 to 4 weeks.  Discussed work;  heavy lifting would not be advised currently.  Provided letter for work, plan to remain off over the next approximately 4 weeks while working with physical therapy.  We discussed consideration of additional imaging of the affected area with MRI, but will hold for now currently given assessment and plan for other intervention.    Follow up (potentially virtually) in 3 to 4 weeks if not improving well physical therapy, sooner if needed; in particular, contact clinic sooner if any concerning changes in symptoms, which may include numbness, tingling, radiation of symptoms beyond the knee, or increased pain.      If you have any further questions for your physician or physician s care team you can call [phone] and use option 3 to leave a voice message. Calls received during business hours will be returned same day.          This note consists of symbols derived from keyboarding, dictation and/or voice recognition software. As a result, there may be errors in the script that have gone undetected. Please consider this when interpreting information found in this chart.

## 2020-11-17 NOTE — LETTER
[facility] [place]  [address]  [place] [address]  Phone: [phone]  Fax: [phone]      November 17, 2020      RE: Mina Ames  [address] [facility] [address]        To whom it may concern:    Mina Ames was seen in clinic today. The employee is UNABLE to return to work for the next 4 weeks, due to a medical condition and planned treatment.        Sincerely,                Geronimo MARTINEZ

## 2020-11-17 NOTE — PATIENT INSTRUCTIONS
Mina to follow up with Primary Care provider regarding elevated blood pressure.    Discussed potential sources of pain, including in the low back there is some facet joint degenerative change, also some tenderness over the sacroiliac joint; there is also tenderness and some discomfort along the course of the left IT band.  Icing, heat, over-the-counter medication as needed for soreness.  Plan physical therapy next, referral placed.  Work on PT exercises over the next 3 to 4 weeks.  Discussed work; heavy lifting would not be advised currently.  Provided letter for work, plan to remain off over the next approximately 4 weeks while working with physical therapy.  We discussed consideration of additional imaging of the affected area with MRI, but will hold for now currently given assessment and plan for other intervention.    Follow up (potentially virtually) in 3 to 4 weeks if not improving well physical therapy, sooner if needed; in particular, contact clinic sooner if any concerning changes in symptoms, which may include numbness, tingling, radiation of symptoms beyond the knee, or increased pain.      If you have any further questions for your physician or physician s care team you can call [phone] and use option 3 to leave a voice message. Calls received during business hours will be returned same day.

## 2020-11-17 NOTE — LETTER
11/17/2020         RE: Mina Ames  [address]  [facility] [address]        Dear Colleague,    Thank you for referring your patient, Mina Ames, to the [facility] [place]. Please see a copy of my visit note below.    Sports Medicine Clinic Visit    PCP: Escobar Curry    Mina Ames is a 65 year old male who is seen  as a self referral presenting with left left pain.  Pain has been increasing over the past 3 weeks.  Pain after use of a seated cardio machine.  Pain in the posterior left hip and down his leg to his knee.  Denies any numbness or tingling.   Pain with walking, standing, movement of his left leg   Does have some pain that will travel across his back.  Slight groin pain.      Injury: use of seated cardio machine   **  + pain in left thigh currently. Starts low back, goes through left hip area; to level of knee.  No radiating symptoms beyond knee. No N/T.        Location of Pain: left low back and leg   Duration of Pain: 3+ weeks  Rating of Pain at worst: 5/10  Rating of Pain Currently: 5/10  Symptoms are better with: Heat  Symptoms are worse with: standing, walking, movement   Additional Features:   Positive:    Negative: swelling, bruising, popping, grinding, catching, locking, instability, paresthesias, numbness, weakness, pain in other joints and systemic symptoms  Other evaluation and/or treatments so far consists of: Ibuprofen and Other medications: topical analgesics  Prior History of related problems: denies for left leg     Social History:  for [employer], requires lifting     Mina was asked to complete the Oswestry Low Back Disability Index.  Disability score: 16%.     Review of Systems  Musculoskeletal: as above  Remainder of review of systems is negative including constitutional, CV, pulmonary, GI, Skin and Neurologic except as noted in HPI or medical history.    Past Medical History:   Diagnosis Date     Adult BMI 30+       DJD (degenerative joint disease) of knee     xray Oct., 2009     Hyperlipidemia      Squamous cell skin cancer 2007    chest     Past Surgical History:   Procedure Laterality Date     C APPENDECTOMY  1971     COLONOSCOPY  2016     SURGICAL HISTORY OF -   1995    wrist     Family History   Problem Relation Age of Onset     Hypertension Mother      Heart Disease Father      Hypertension Father      Lipids Father      Depression Father      Coronary Artery Disease Father         Bypass surgery - 3      Social History     Socioeconomic History     Marital status:      Spouse name: Not on file     Number of children: Not on file     Years of education: Not on file     Highest education level: Not on file   Occupational History     Not on file   Social Needs     Financial resource strain: Not on file     Food insecurity     Worry: Not on file     Inability: Not on file     Transportation needs     Medical: Not on file     Non-medical: Not on file   Tobacco Use     Smoking status: Never Smoker     Smokeless tobacco: Never Used   Substance and Sexual Activity     Alcohol use: No     Drug use: No     Sexual activity: Not Currently     Partners: Female     Comment: defer to md   Lifestyle     Physical activity     Days per week: Not on file     Minutes per session: Not on file     Stress: Not on file   Relationships     Social connections     Talks on phone: Not on file     Gets together: Not on file     Attends Muslim service: Not on file     Active member of club or organization: Not on file     Attends meetings of clubs or organizations: Not on file     Relationship status: Not on file     Intimate partner violence     Fear of current or ex partner: Not on file     Emotionally abused: Not on file     Physically abused: Not on file     Forced sexual activity: Not on file   Other Topics Concern     Parent/sibling w/ CABG, MI or angioplasty before 65F 55M? No   Social History Narrative     Not on file  "      Objective  BP (!) 142/80   Ht 1.727 m (5' 8\")   Wt 102.1 kg (225 lb)   BMI 34.21 kg/m      GENERAL APPEARANCE: healthy, alert and no distress   GAIT: ambulates independently  SKIN: no suspicious lesions or rashes  NEURO: Normal strength and tone, mentation intact and speech normal  PSYCH:  mentation appears normal and affect normal/bright  HEENT: no scleral icterus  CV: distal perfusion intact  RESP: nonlabored breathing      Low back exam:    Inspection:       no visible deformity in the low back       normal skin       normal vascular       normal lymphatic    ROM:       Flexion hands to ankles, no change  Extension with some pain reproduced    Tender:       paraspinal muscles       SI joint   left    Non Tender:       remainder of lumbar spine    Strength:       hip flexion 5/5       knee extension 5/5       ankle dorsiflexion 5/5       ankle plantarflexion 5/5       dorsiflexion of the great toe 5/5    Reflexes:       patellar (L3, L4) symmetric normal       achilles tendons (S1) symmetric normal    Sensation:      grossly intact throughout lower extremities    Skin:       well perfused       capillary refill brisk    Special tests:       straight leg raise left some left posterior hip and lateral hip pain, mild radiation into the thigh       straight leg raise right negative       No significant change with ALIYA       slump test left posterior lateral hip pain, mild radiation into the thigh        Left hip exam    Inspection:      no edema or ecchymosis in hip area    ROM:     Grossly symmetric active and passive ROM     Strength:      flexion        abduction        adduction   Grossly intact, some mild pain laterally with abduction    Tender:      TFL  Along lateral hip, lateral thigh, course of IT band    Non Tender:      remainder of hip area    Sensation:      grossly intact in hip and thigh    Special Tests:      neg (-) ALIYA       Lateral pain with FADIR       Lateral pain with Hortensia       Log " roll neg          Radiology:  Visualized radiographs as noted below, and reviewed the images with the patient; if the report was available at the time of the visit, the report was reviewed as well.  Degenerative change, nothing acute.    Recent Results (from the past 24 hour(s))   XR Lumbar Spine 2/3 Views    Narrative    LUMBAR SPINE TWO-THREE  VIEWS  11/17/2020 10:55 AM     HISTORY: Acute left-sided low back pain with left-sided sciatica    COMPARISON: None.    FINDINGS: 5 lumbar type vertebrae. Mild curvature of the lumbar spine  convex towards the left. Loss of disc space height at L2-3, L3-4, and  L4-5. Degenerative change in the facet joints..      Impression    IMPRESSION: Multilevel degenerative changes.    LEONARD TREVINO MD         Assessment:  1. Acute left-sided low back pain with left-sided sciatica        Plan:  Discussed the assessment with the patient.  X-rays show some facet arthrosis, degenerative disc disease.  Also with tenderness over the SI joint, but negative Anmol.  Also, tenderness along the lateral thigh, along course of IT band.  See patient instructions.  We primarily discussed consideration of physical therapy next, versus additional imaging with MRI.  Start with therapy, referral placed.  Follow up: 3 to 4 weeks if not improving, sooner if needed.  May check in virtually instead of in person, if ongoing issues. Likely MRI lumbar if ongoing issues, vs consider injection therapy for SI joint.  Questions answered. Discussed signs and symptoms that may indicate more serious issues; the patient was instructed to seek appropriate care if noted. Mina indicates understanding of these issues and agrees with the plan.      Geronimo Gomes DO, CAQ            Patient Instructions   Mina to follow up with Primary Care provider regarding elevated blood pressure.    Discussed potential sources of pain, including in the low back there is some facet joint degenerative change, also some tenderness over  the sacroiliac joint; there is also tenderness and some discomfort along the course of the left IT band.  Icing, heat, over-the-counter medication as needed for soreness.  Plan physical therapy next, referral placed.  Work on PT exercises over the next 3 to 4 weeks.  Discussed work; heavy lifting would not be advised currently.  Provided letter for work, plan to remain off over the next approximately 4 weeks while working with physical therapy.  We discussed consideration of additional imaging of the affected area with MRI, but will hold for now currently given assessment and plan for other intervention.    Follow up (potentially virtually) in 3 to 4 weeks if not improving well physical therapy, sooner if needed; in particular, contact clinic sooner if any concerning changes in symptoms, which may include numbness, tingling, radiation of symptoms beyond the knee, or increased pain.      If you have any further questions for your physician or physician s care team you can call [phone] and use option 3 to leave a voice message. Calls received during business hours will be returned same day.          This note consists of symbols derived from keyboarding, dictation and/or voice recognition software. As a result, there may be errors in the script that have gone undetected. Please consider this when interpreting information found in this chart.          Again, thank you for allowing me to participate in the care of your patient.        Sincerely,        Geronimo Gomes, DO

## 2020-11-18 ENCOUNTER — THERAPY VISIT (OUTPATIENT)
Dept: PHYSICAL THERAPY | Facility: CLINIC | Age: 65
End: 2020-11-18
Attending: PEDIATRICS
Payer: COMMERCIAL

## 2020-11-18 DIAGNOSIS — M54.42 ACUTE LEFT-SIDED LOW BACK PAIN WITH LEFT-SIDED SCIATICA: ICD-10-CM

## 2020-11-18 DIAGNOSIS — M54.50 ACUTE LEFT-SIDED LOW BACK PAIN WITHOUT SCIATICA: Primary | ICD-10-CM

## 2020-11-18 PROCEDURE — 97110 THERAPEUTIC EXERCISES: CPT | Mod: GP | Performed by: PHYSICAL THERAPIST

## 2020-11-18 PROCEDURE — 97530 THERAPEUTIC ACTIVITIES: CPT | Mod: GP | Performed by: PHYSICAL THERAPIST

## 2020-11-18 PROCEDURE — 97161 PT EVAL LOW COMPLEX 20 MIN: CPT | Mod: GP | Performed by: PHYSICAL THERAPIST

## 2020-11-18 NOTE — PROGRESS NOTES
[facility] Initial Evaluation -- Lumbar    Date: November 18, 2020  Mina Ames is a 65 year old male with a LScondition.   Referral: SM  Work mechanical stresses:    Employment status:    Leisure mechanical stresses: walking  Functional disability score (CAMMIE/STarT Back):    VAS score (0-10): 5  Patient goals/expectations:  Stand and walk pain free    HISTORY:    Present symptoms: L lateral thigh L buttocks   Pain quality (sharp/shooting/stabbing/aching/burning/cramping): burning  Paresthesia (yes/no):  no    Present since (onset date): [id number].     Symptoms (improving/unchanging/worsening):  unchanged    Symptoms commenced as a result of: working out on a cardio machine similar to recumbent bike    Condition occurred in the following environment:   home     Symptoms at onset (back/thighbut no previous Rx/leg): leg  Constant symptoms (back/thigh/leg): yes  Intermittent symptoms (back/thigh/leg):     Symptoms are made worse with the following: Always Rising, Always Standing, Always Walking, Always Lying SL R, Time of day - No effect and Always On the move   Symptoms are made better with the following: Other - sitting     Disturbed sleep (yes/no):  no Sleeping postures (prone/sup/side R/L):     Previous episodes (0/1-5/6-10/11+): 0 for leg,  LBP in past Year of first episode: on and off over the years     Previous history:   Previous treatments: none      Specific Questions:  Cough/Sneeze/Strain (pos/neg): neg  Bowel/Bladder (normal/abnormal): normal  Gait (normal/abnormal): abnormal  Medications (nil/NSAIDS/analg/steroids/anticoag/other):  Other - Cardiac  Medical allergies: none  General health (excellent/good/fair/poor):  good  Pertinent medical history:  Overweight  Imaging (None/Xray/MRI/Other):  none  Recent or major surgery (yes/no):  no  Night pain (yes/no): no  Accidents (yes/no): no  Unexplained weight loss (yes/no): na  Barriers at home: none  Other red flags:  none    EXAMINATION    Posture:   Sitting (good/fair/poor): fair  Standing (good/fair/poor):  Lordosis (red/acc/normal): dec  Correction of posture (better/worse/no effect): worse    Lateral Shift (right/left/nil): no  Relevant (yes/no):    Other Observations:     Neurological:    Motor deficit:    Reflexes:    Sensory deficit:    Dural signs:      Movement Loss:   [place] Mod Min Nil Pain   Flexion    + abolished   Extension  +   + B LS   Side Gliding R   +     Side Gliding L  +   Butt      Test Movements:   During: produces, abolishes, increases, decreases, no effect, centralizing, peripheralizing   After: better, worse, no better, no worse, no effect, centralized, peripheralized    Pretest symptoms standing: L butt lat thigh   Symptoms During Symptoms After ROM increased ROM decreased No Effect   FIS          Rep FIS Decreases    Better         EIS          Rep EIS          Pretest symptoms lying:       Symptoms During Symptoms After ROM increased ROM decreased No Effect   ANNELISE          Rep ANNELISE Decreases    Better    ext     EIL          Rep EIL          If required, pretest symptoms:    Symptoms During Symptoms After ROM increased ROM decreased No Effect   SGIS - R          Rep SGIS - R          SGIS - L          Rep SGIS - L            Static Tests:  Sitting slouched:        Sitting erect:    Standing slouched   Standing erect:    Lying prone in extension:   Long sitting:      Other Tests: FISit no effect, better inc extension    Provisional Classification:  Derangement - Asymmetrical, unilateral, symptoms above knee    Principle of Management:  Education:  DP is flexion centralization discussed using TYOB HEP   Equipment provided:    Mechanical therapy (Y/N):  Y   Extension principle:    Lateral Principle:    Flexion principle:  ANNELISE or FISit 1/10 q 2-3 hours  Other:      ASSESSMENT/PLAN:    Patient is a 65 year old male with lumbar complaints.    Patient has the following significant findings with corresponding  treatment plan.                Diagnosis 1:  L LS pain  Pain -  self management, education, directional preference exercise and home program  Decreased ROM/flexibility - therapeutic exercise, therapeutic activity and home program  Decreased joint mobility - therapeutic exercise, therapeutic activity and home program  Decreased function - therapeutic activities and home program        Previous and current functional limitations:  (See Goal Flow Sheet for this information)    Short term and Long term goals: (See Goal Flow Sheet for this information)     Communication ability:  Patient appears to be able to clearly communicate and understand verbal and written communication and follow directions correctly.  Treatment Explanation - The following has been discussed with the patient:   RX ordered/plan of care  Anticipated outcomes  Possible risks and side effects  This patient would benefit from PT intervention to resume normal activities.   Rehab potential is good.    Frequency:  1 X week, once daily  Duration:  for 4 weeks  Discharge Plan:  Achieve all LTG.  Independent in home treatment program.  Reach maximal therapeutic benefit.    Please refer to the daily flowsheet for treatment today, total treatment time and time spent performing 1:1 timed codes.

## 2020-11-23 ENCOUNTER — THERAPY VISIT (OUTPATIENT)
Dept: PHYSICAL THERAPY | Facility: CLINIC | Age: 65
End: 2020-11-23
Payer: COMMERCIAL

## 2020-11-23 DIAGNOSIS — M54.50 ACUTE LEFT-SIDED LOW BACK PAIN WITHOUT SCIATICA: ICD-10-CM

## 2020-11-23 PROCEDURE — 97110 THERAPEUTIC EXERCISES: CPT | Mod: GP | Performed by: PHYSICAL THERAPY ASSISTANT

## 2020-11-23 PROCEDURE — 97530 THERAPEUTIC ACTIVITIES: CPT | Mod: GP | Performed by: PHYSICAL THERAPY ASSISTANT

## 2020-12-02 ENCOUNTER — THERAPY VISIT (OUTPATIENT)
Dept: PHYSICAL THERAPY | Facility: CLINIC | Age: 65
End: 2020-12-02
Payer: COMMERCIAL

## 2020-12-02 DIAGNOSIS — M54.50 ACUTE LEFT-SIDED LOW BACK PAIN WITHOUT SCIATICA: ICD-10-CM

## 2020-12-02 PROCEDURE — 97110 THERAPEUTIC EXERCISES: CPT | Mod: GP | Performed by: PHYSICAL THERAPY ASSISTANT

## 2020-12-02 NOTE — PROGRESS NOTES
Sports Medicine Clinic Visit    PCP: Escobar Curry CLAYTON Ames is a 65 year old male who is seen in f/u up for Acute left-sided low back pain with left-sided sciatica. Since last visit on 11/17/2020 patient has been doing PT and home exercises. Reports minimal improvement. Still having pain into left hip, posterior leg, and knee. Denied numbness/tingling.   **  Currently mild pain in posterior hip area, near waist line. Otherwise, is more in hip/anterior thigh area on left.        Review of Systems  All other systems reviewed and are negative unless noted above.    Past Medical History:   Diagnosis Date     Adult BMI 30+      DJD (degenerative joint disease) of knee     xray Oct., 2009     Hyperlipidemia      Squamous cell skin cancer 2007    chest     Past Surgical History:   Procedure Laterality Date     C APPENDECTOMY  1971     COLONOSCOPY  2016     SURGICAL HISTORY OF -   1995    wrist     Family History   Problem Relation Age of Onset     Hypertension Mother      Heart Disease Father      Hypertension Father      Lipids Father      Depression Father      Coronary Artery Disease Father         Bypass surgery - 3      Social History     Socioeconomic History     Marital status:      Spouse name: Not on file     Number of children: Not on file     Years of education: Not on file     Highest education level: Not on file   Occupational History     Not on file   Social Needs     Financial resource strain: Not on file     Food insecurity     Worry: Not on file     Inability: Not on file     Transportation needs     Medical: Not on file     Non-medical: Not on file   Tobacco Use     Smoking status: Never Smoker     Smokeless tobacco: Never Used   Substance and Sexual Activity     Alcohol use: No     Drug use: No     Sexual activity: Not Currently     Partners: Female     Comment: defer to md   Lifestyle     Physical activity     Days per week: Not on file     Minutes per session: Not on file      "Stress: Not on file   Relationships     Social connections     Talks on phone: Not on file     Gets together: Not on file     Attends Caodaism service: Not on file     Active member of club or organization: Not on file     Attends meetings of clubs or organizations: Not on file     Relationship status: Not on file     Intimate partner violence     Fear of current or ex partner: Not on file     Emotionally abused: Not on file     Physically abused: Not on file     Forced sexual activity: Not on file   Other Topics Concern     Parent/sibling w/ CABG, MI or angioplasty before 65F 55M? No   Social History Narrative     Not on file         Objective  /82 (BP Location: Right arm, Patient Position: Sitting, Cuff Size: Adult Large)   Ht 1.753 m (5' 9\")   Wt 102.1 kg (225 lb)   BMI 33.23 kg/m      GENERAL APPEARANCE: healthy, alert and no distress   GAIT: antalgic  SKIN: no suspicious lesions or rashes  NEURO: Normal strength and tone, mentation intact and speech normal  PSYCH:  mentation appears normal and affect normal/bright  HEENT: no scleral icterus  CV: distal perfusion intact  RESP: nonlabored breathing      Low back exam:    Inspection:       no visible deformity in the low back       normal skin       normal vascular       normal lymphatic    ROM:       Flexion hands to ankles, no change  Extension with some pain reproduced    Strength:       LE grossly intact    Skin:       well perfused       capillary refill brisk    Special tests:       straight leg raise left some radiating symptoms       No significant change with ALIYA       slump test left posterior lateral hip pain, mild radiation into the thigh        Left hip exam    Inspection:      no edema or ecchymosis in hip area    ROM:     Grossly symmetric     Tender:      Along lateral hip, lateral thigh, course of IT band    Special Tests:     Lateral pain with FADIR       Log roll neg        Radiology  See previous visit notes.     Assessment:  1. " Acute left-sided low back pain with left-sided sciatica        Plan:  Discussed the assessment with the patient.  Reviewed course and options. Continued symptoms, though has been doing PT.  Keep up with HEP from PT.  With ongoing low back pain with radiation, will obtain lumbar MRI next.  Updated letter for work today.  Follow up: contact pt with MRI results. We discussed potential for lumbar injection (possible MINDY for radiating symptoms, vs facet target with low back pain with pain with extension).  Questions answered. Discussed signs and symptoms that may indicate more serious issues; the patient was instructed to seek appropriate care if noted. Mina indicates understanding of these issues and agrees with the plan.      Geronimo Gomes DO, CAQ          Patient Instructions   Call [phone] to schedule MRI. We will call with results.    Letter provided for work.        This note consists of symbols derived from keyboarding, dictation and/or voice recognition software. As a result, there may be errors in the script that have gone undetected. Please consider this when interpreting information found in this chart.

## 2020-12-04 ENCOUNTER — OFFICE VISIT (OUTPATIENT)
Dept: ORTHOPEDICS | Facility: CLINIC | Age: 65
End: 2020-12-04
Payer: COMMERCIAL

## 2020-12-04 VITALS
SYSTOLIC BLOOD PRESSURE: 136 MMHG | BODY MASS INDEX: 33.33 KG/M2 | WEIGHT: 225 LBS | HEIGHT: 69 IN | DIASTOLIC BLOOD PRESSURE: 82 MMHG

## 2020-12-04 DIAGNOSIS — M54.42 ACUTE LEFT-SIDED LOW BACK PAIN WITH LEFT-SIDED SCIATICA: Primary | ICD-10-CM

## 2020-12-04 PROCEDURE — 99213 OFFICE O/P EST LOW 20 MIN: CPT | Performed by: PEDIATRICS

## 2020-12-04 ASSESSMENT — MIFFLIN-ST. JEOR: SCORE: 1800.97

## 2020-12-04 NOTE — LETTER
12/4/2020         RE: Mina Ames  [address]  [facility] [address]        Dear Colleague,    Thank you for referring your patient, Mina Ames, to the [facility] [place]. Please see a copy of my visit note below.    Sports Medicine Clinic Visit    PCP: Escobar Curry    Mina Ames is a 65 year old male who is seen in f/u up for Acute left-sided low back pain with left-sided sciatica. Since last visit on 11/17/2020 patient has been doing PT and home exercises. Reports minimal improvement. Still having pain into left hip, posterior leg, and knee. Denied numbness/tingling.   **  Currently mild pain in posterior hip area, near waist line. Otherwise, is more in hip/anterior thigh area on left.        Review of Systems  All other systems reviewed and are negative unless noted above.    Past Medical History:   Diagnosis Date     Adult BMI 30+      DJD (degenerative joint disease) of knee     xray Oct., 2009     Hyperlipidemia      Squamous cell skin cancer 2007    chest     Past Surgical History:   Procedure Laterality Date     C APPENDECTOMY  1971     COLONOSCOPY  2016     SURGICAL HISTORY OF -   1995    wrist     Family History   Problem Relation Age of Onset     Hypertension Mother      Heart Disease Father      Hypertension Father      Lipids Father      Depression Father      Coronary Artery Disease Father         Bypass surgery - 3      Social History     Socioeconomic History     Marital status:      Spouse name: Not on file     Number of children: Not on file     Years of education: Not on file     Highest education level: Not on file   Occupational History     Not on file   Social Needs     Financial resource strain: Not on file     Food insecurity     Worry: Not on file     Inability: Not on file     Transportation needs     Medical: Not on file     Non-medical: Not on file   Tobacco Use     Smoking status: Never Smoker     Smokeless tobacco:  "Never Used   Substance and Sexual Activity     Alcohol use: No     Drug use: No     Sexual activity: Not Currently     Partners: Female     Comment: defer to md   Lifestyle     Physical activity     Days per week: Not on file     Minutes per session: Not on file     Stress: Not on file   Relationships     Social connections     Talks on phone: Not on file     Gets together: Not on file     Attends Mosque service: Not on file     Active member of club or organization: Not on file     Attends meetings of clubs or organizations: Not on file     Relationship status: Not on file     Intimate partner violence     Fear of current or ex partner: Not on file     Emotionally abused: Not on file     Physically abused: Not on file     Forced sexual activity: Not on file   Other Topics Concern     Parent/sibling w/ CABG, MI or angioplasty before 65F 55M? No   Social History Narrative     Not on file         Objective  /82 (BP Location: Right arm, Patient Position: Sitting, Cuff Size: Adult Large)   Ht 1.753 m (5' 9\")   Wt 102.1 kg (225 lb)   BMI 33.23 kg/m      GENERAL APPEARANCE: healthy, alert and no distress   GAIT: antalgic  SKIN: no suspicious lesions or rashes  NEURO: Normal strength and tone, mentation intact and speech normal  PSYCH:  mentation appears normal and affect normal/bright  HEENT: no scleral icterus  CV: distal perfusion intact  RESP: nonlabored breathing      Low back exam:    Inspection:       no visible deformity in the low back       normal skin       normal vascular       normal lymphatic    ROM:       Flexion hands to ankles, no change  Extension with some pain reproduced    Strength:       LE grossly intact    Skin:       well perfused       capillary refill brisk    Special tests:       straight leg raise left some radiating symptoms       No significant change with ALIYA       slump test left posterior lateral hip pain, mild radiation into the thigh        Left hip exam    Inspection:      " no edema or ecchymosis in hip area    ROM:     Grossly symmetric     Tender:      Along lateral hip, lateral thigh, course of IT band    Special Tests:     Lateral pain with FADIR       Log roll neg        Radiology  See previous visit notes.     Assessment:  1. Acute left-sided low back pain with left-sided sciatica        Plan:  Discussed the assessment with the patient.  Reviewed course and options. Continued symptoms, though has been doing PT.  Keep up with HEP from PT.  With ongoing low back pain with radiation, will obtain lumbar MRI next.  Updated letter for work today.  Follow up: contact pt with MRI results. We discussed potential for lumbar injection (possible MINDY for radiating symptoms, vs facet target with low back pain with pain with extension).  Questions answered. Discussed signs and symptoms that may indicate more serious issues; the patient was instructed to seek appropriate care if noted. Mina indicates understanding of these issues and agrees with the plan.      Geronimo Gomes DO, CAQ          Patient Instructions   Call [phone] to schedule MRI. We will call with results.    Letter provided for work.        This note consists of symbols derived from keyboarding, dictation and/or voice recognition software. As a result, there may be errors in the script that have gone undetected. Please consider this when interpreting information found in this chart.          Again, thank you for allowing me to participate in the care of your patient.        Sincerely,        Geronimo Gomes DO

## 2020-12-04 NOTE — LETTER
[facility] [place]  [address]  [place] [address]  Phone: [phone]  Fax: [phone]      December 4, 2020       RE: Mina Ames  [address] [facility] [address]        To whom it may concern:    Mina Ames was seen in clinic today. The employee is UNABLE to return to work at this time, at least through 12/14/20 as previously indicated, due to a medical condition and planned investigation and treatment.  Further advisement on restrictions will be forthcoming.      Sincerely,                Geronimo SHANKAR.

## 2020-12-09 ENCOUNTER — ANCILLARY PROCEDURE (OUTPATIENT)
Dept: MRI IMAGING | Facility: CLINIC | Age: 65
End: 2020-12-09
Attending: PEDIATRICS
Payer: COMMERCIAL

## 2020-12-09 DIAGNOSIS — M54.42 ACUTE LEFT-SIDED LOW BACK PAIN WITH LEFT-SIDED SCIATICA: ICD-10-CM

## 2020-12-09 PROCEDURE — 72148 MRI LUMBAR SPINE W/O DYE: CPT | Mod: TC

## 2020-12-11 ENCOUNTER — TELEPHONE (OUTPATIENT)
Dept: ORTHOPEDICS | Facility: CLINIC | Age: 65
End: 2020-12-11

## 2020-12-11 DIAGNOSIS — M54.42 ACUTE LEFT-SIDED LOW BACK PAIN WITH LEFT-SIDED SCIATICA: Primary | ICD-10-CM

## 2020-12-11 NOTE — TELEPHONE ENCOUNTER
Results for orders placed or performed in visit on 12/09/20   MRI Lumbar spine w/o contrast    Narrative    MRI LUMBAR SPINE WITHOUT CONTRAST   12/9/2020 5:49 PM     HISTORY: Ongoing left lower extremity pain, posterior hip pain,  persistent symptoms despite conservative treatment. Acute left-sided  low back pain with left-sided sciatica.    TECHNIQUE: Multiplanar multisequence MRI of the lumbar spine without  contrast.     COMPARISON: Lumbar spine radiographs dated 11/17/2020.     FINDINGS:  Five lumbar vertebral bodies are presumed. Grade 1  anterolisthesis of L4 on L5 measuring approximately 3 mm. Sagittal  alignment is otherwise normal. Normal vertebral body heights. Mild  Modic type II degenerative endplate change at T11-T12 and T12-L1, and  also at L3-L4 and L4-L5. Small presumed intraosseous hemangioma or  focal fatty marrow in the L3 vertebral body. Normal appearance of the  distal spinal cord with the conus terminating at L2. Visualized  paraspinous soft tissues and the visualized aspects of the bony pelvis  are unremarkable.    Segmental analysis:  T12-L1: Normal disc height and signal. Shallow symmetric disc bulge.  No herniation. Normal facets. No spinal canal or neural foraminal  stenosis.    L1-L2: Normal disc height and signal. No herniation. Normal facets. No  spinal canal or neural foraminal stenosis.    L2-L3: Normal disc height and signal. There is a small disc bulge  slightly eccentric to the left. Mild facet arthropathy and ligamentum  flavum thickening. No spinal canal stenosis. No significant neural  foraminal stenosis.    L3-L4: Mild disc height loss. There is a disc bulge eccentric to the  left with what appears to be a superimposed cranially migrating left  foraminal disc extrusion (series 5 image 25, series 2 image 12). Mild  to moderate facet arthropathy and mild ligamentum flavum thickening.  No spinal canal stenosis. Moderate left neural foraminal stenosis with  the anterior superior  aspect of the extrusion at least contacting the  exiting left L3 nerve root. There is mild right neural foraminal  stenosis.    L4-L5: Mild disc height loss. Small symmetric disc bulge. Moderate to  severe bilateral facet arthropathy. No spinal canal stenosis. Mild to  moderate left and mild right neural foraminal stenosis.    L5-S1: Normal disc height and signal. No herniation. Moderate/moderate  to severe bilateral facet arthropathy. No spinal canal stenosis. No  significant neural foraminal stenosis.      Impression    IMPRESSION:  1. Multilevel degenerative disc disease and facet arthropathy of the  lumbar spine, as described.  2. Particular note is made of a disc bulge with superimposed cranially  migrating left foraminal disc extrusion at L3-L4, which in combination  with facet arthropathy results in moderate left L3-L4 neural foraminal  stenosis. There are lesser degrees of neural foraminal narrowing  elsewhere.  3. No significant central spinal canal stenosis.  4. Please see the body of the report for additional details, including  level by level findings.    MARIO ROCHE MD

## 2020-12-11 NOTE — LETTER
[facility] [place]  [address]  [place] [address]  Phone: [phone]  Fax: [phone]      December 14, 2020      RE: Mina Ames  [address] [facility] [address]        To whom it may concern:    Mina Ames was seen in clinic today. The employee is UNABLE to return to work at this time, at least through 1/4/2021due to a medical condition and additional treatment.        Sincerely,                Geronimo SHANKAR.

## 2020-12-14 NOTE — TELEPHONE ENCOUNTER
"Multilevel degenerative change; this includes related to the discs and the facet joints (\"spine arthritis\"). Also, there is a prominent finding on left at L3-L4, which could affect the L3 nerve root and contribute to radiating symptoms to the left LE.  Given already doing PT, would offer referral for injection through pain management. We had discussed this consideration at his visit. Would anticipate MINDY to start, though future consideration may also be facet target (also through pain mgmt).  If interested in injection, would place referral, and have him follow up virtually in 2-3 weeks.  I would be happy to have a visit with the patient (in person, by video, or by phone) to discuss further if that would be helpful.  Thanks.  Geronimo Gomes, , CAQ      "

## 2020-12-14 NOTE — TELEPHONE ENCOUNTER
"Called and spoke with patient.  Continues to be \"sore\".  Discussed results and treatment options.  He would like try injection.  Referral placed.  He asked about work status.  Note for off work was valid until today.   Discussed with celine Soriano for additional 3 weeks off to allow for injection.   Letter sent thru MyChart.    Karina Lainez MS ATC    "

## 2020-12-15 ENCOUNTER — TELEPHONE (OUTPATIENT)
Dept: PALLIATIVE MEDICINE | Facility: CLINIC | Age: 65
End: 2020-12-15

## 2020-12-15 NOTE — TELEPHONE ENCOUNTER
Screening Questions for Radiology Injections:    Injection to be done at which interventional clinic site? [facility] [place]    If [facility] location, tell patient that this procedure requires a COVID-19 lab test be done within 4 days of the procedure. Would you still like to move forward with scheduling the procedure?  Not Applicable   If YES, let patient know that someone will call them to schedule the COVID-19 test and that they will only receive a call back if the result is positive. Route to nursing to enter order.     Instruct patient to arrive as directed prior to the scheduled appointment time:    [place] minutes before      [place]: 30 minutes before; if IV needed 1 hour before     Procedure ordered by Ricarda     Procedure ordered? LESI       Transforaminal Cervical MINDY - no pain provider currently performing    As a reminder, receiving steroids can decrease your body's ability to fight infection.   Would you still like to move forward with scheduling the injection?  Yes    What insurance would patient like us to bill for this procedure? BCBS       Worker's comp or MVA (motor vehicle accident) -Any injection DO NOT SCHEDULE and route to Hanane Seo.      [employer] insurance - For SI joint injections, DO NOT SCHEDULE and route Hanane Seo.       ALL BCBS, [employer] and [employer]-Route to Hanane for review DO NOT SCHEDULE      IF SCHEDULING IN [place] AND NEEDS A PA, IT IS OKAY TO SCHEDULE. [place] HANDLES THEIR OWN PA'S AFTER THE PATIENT IS SCHEDULED. PLEASE SCHEDULE AT LEAST 1 WEEK OUT SO A PA CAN BE OBTAINED.    Any chance of pregnancy? Not Applicable   If YES, do NOT schedule and route to RN [place]    Is an  needed? No     Patient has a drive home? (mandatory) YES: informed     Is patient taking any blood thinners (i.e. plavix, coumadin, jantoven, warfarin, heparin, pradaxa or dabigatran, etc)? No   If hold needed, do NOT schedule, route to RN pool      Is patient taking any aspirin products (includes Excedrin and Fiorinal)? Yes - Pt takes 81mg daily; instructed to hold 0 day(s) prior to procedure.      If more than 325mg/day, OK to schedule; Instruct pt to decrease to less than 325 mg for 7 days AND route to RN pool    For CERVICAL procedures, hold all aspirin products for 6 days.     Tell pt that if aspirin product is not held for 6 days, the procedure WILL BE cancelled.      Does the patient have a bleeding or clotting disorder? No     If YES, okay to schedule AND route to RN nurse pool    For any patients with platelet count <100, must be forwarded to provider    Is patient diabetic?  No  If YES, instruct them to bring their glucometer.    Does patient have an active infection or treated for one within the past week? No     Is patient currently taking any antibiotics?  No     For patients on chronic, preventative, or prophylactic antibiotics, procedures may be scheduled.     For patients on antibiotics for active or recent infection:antibiotic course must have been completed for 4 days    Is patient currently taking any steroid medications? (i.e. Prednisone, Medrol)  No     For patients on steroid medications, course must have been completed for 4 days    Is patient actively being treated for cancer or immunocompromised? No  If YES, do NOT schedule and route to RN pool     Are you able to get on and off an exam table with minimal or no assistance? Yes  If NO, do NOT schedule and route to RN pool    Are you able to roll over and lay on your stomach with minimal or no assistance? Yes  If NO, do NOT schedule and route to RN pool     Any allergies to contrast dye, iodine, shellfish, or numbing and steroid medications? No  If YES, route to RN pool AND add allergy information to appointment notes    Allergies: Nkda [no known drug allergies]      Has the patient had a flu shot or any other vaccinations within 7 days before or after the procedure.  No     Does  patient have an MRI/CT?  YES: 2020  Check Procedure Scheduling Grid to see if required.      Was the MRI done within the last 3 years?  Yes    If yes, where was the MRI done i.e.[facility], [facility], [facility], [facility] etc? [place]     If no, do not schedule and route to RN pool    If MRI was not done at [facility], [facility] or [facility] do NOT schedule and route to RN pool.      If pt has an imaging disc, the injection MAY be scheduled but pt has to bring disc to appt.     If they show up without the disc the injection cannot be done    Procedure Specific Instructions:      If celiac plexus block, informed patient NPO for 6 hours and that it is okay to take medications with sips of water, especially blood pressure medications  Not Applicable         If this is for a cervical procedure, informed patient that aspirin needs to be held for 6 days.   Not Applicable      If IV needed:    Do not schedule procedures requiring IV placement in the first appointment of the day or first appointment after lunch. Do NOT schedule at 0745, 0815 or 1245.     Instructed pt to arrive 30 minutes early for IV start if required. (Check Procedure Scheduling Grid)  Not Applicable    Reminders:      If you are started on any steroids or antibiotics between now and your appointment, you must contact us because the procedure may need to be cancelled.  No      For all procedures except radiofrequency ablations (RFAs) and spinal cord stimulator (SCS) trials, informed patient:    IV sedation is not provided for this procedure.  If you feel that an oral anti-anxiety medication is needed, you can discuss this further with your referring provider or primary care provider.  The Pain Clinic provider will discuss specifics of what the procedure includes at your appointment.  Most procedures last 10-20 minutes.  We use numbing medications to help with any discomfort during the procedure.  Not Applicable      For patients 85 or older we  recommend having an adult stay w/ them for the remainder of the day.       Does the patient have any questions?  NO  Vilma Chambers  [facility] [place]

## 2020-12-16 NOTE — TELEPHONE ENCOUNTER
PA pending additional information - please specify exact level, laterality and approach of injection.      Hanane HANNAH    [facility]

## 2020-12-18 NOTE — TELEPHONE ENCOUNTER
No PA required, okay to schedule        Hanane HANNAH    [facility] [place] [facility]

## 2020-12-21 ENCOUNTER — RADIOLOGY INJECTION OFFICE VISIT (OUTPATIENT)
Dept: PALLIATIVE MEDICINE | Facility: CLINIC | Age: 65
End: 2020-12-21
Payer: COMMERCIAL

## 2020-12-21 ENCOUNTER — ANCILLARY PROCEDURE (OUTPATIENT)
Dept: RADIOLOGY | Facility: CLINIC | Age: 65
End: 2020-12-21
Attending: PSYCHIATRY & NEUROLOGY
Payer: COMMERCIAL

## 2020-12-21 VITALS
DIASTOLIC BLOOD PRESSURE: 99 MMHG | SYSTOLIC BLOOD PRESSURE: 149 MMHG | RESPIRATION RATE: 16 BRPM | HEART RATE: 79 BPM | OXYGEN SATURATION: 97 %

## 2020-12-21 DIAGNOSIS — M54.16 LUMBAR RADICULOPATHY: ICD-10-CM

## 2020-12-21 DIAGNOSIS — M54.42 ACUTE LEFT-SIDED LOW BACK PAIN WITH LEFT-SIDED SCIATICA: ICD-10-CM

## 2020-12-21 DIAGNOSIS — M54.16 LUMBAR RADICULOPATHY: Primary | ICD-10-CM

## 2020-12-21 PROCEDURE — 64483 NJX AA&/STRD TFRM EPI L/S 1: CPT | Mod: LT | Performed by: PSYCHIATRY & NEUROLOGY

## 2020-12-21 ASSESSMENT — PAIN SCALES - GENERAL: PAINLEVEL: MODERATE PAIN (5)

## 2020-12-21 NOTE — NURSING NOTE
Pre-procedure Intake    Have you been fasting? NA    If yes, for how long? No     Are you taking a prescribed blood thinner such as coumadin, Plavix, Xarelto?    No    If yes, when did you take your last dose? No     Do you take aspirin?  Yes     If cervical procedure, have you held aspirin for 6 days?   No    Do you have any allergies to contrast dye, iodine, steroid and/or numbing medications?  NO    Are you currently taking antibiotics or have an active infection?  NO    Have you had a fever/elevated temperature within the past week? NO    Are you currently taking oral steroids? NO    Do you have a ? Yes       Are you pregnant or breastfeeding?  Not Applicable    Are the vital signs normal?  Yes  Priscilla Lopes MA

## 2020-12-21 NOTE — PATIENT INSTRUCTIONS
[facility]   Procedure Discharge Instructions    Today you saw: Dr. Monika Daley     You had an: Lumbar  Epidural steroid injection       Medications used:  Lidocaine   Bupivacaine   Dexamethasone Omnipaque        If you were holding your blood thinning medication, please restart taking it: N/A    Be cautious when walking. Numbness and/or weakness in the lower extremities may occur for up to 6-8 hours after the procedure due to effect of the local anesthetic    Do not drive for 6 hours. The effect of the local anesthetic could slow your reflexes.     You may resume your regular activities after 24 hours    Avoid strenuous activity for the first 24 hours    You may shower, however avoid swimming, tub baths or hot tubs for 24 hours following your procedure    You may have a mild to moderate increase in pain for several days following the injection.    It may take up to 14 days for the steroid medication to start working although you may feel the effect as early as a few days after the procedure.       You may use ice packs for 10-15 minutes, 3 to 4 times a day at the injection site for comfort    Do not use heat to painful areas for 6 to 8 hours. This will give the local anesthetic time to wear off and prevent you from accidentally burning your skin.     Unless you have been directed to avoid the use of anti-inflammatory medications (NSAIDS), you may use medications such as ibuprofen, Aleve or Tylenol for pain control if needed.     Possible side effects of steroids that you may experience include flushing, elevated blood pressure, increased appetite, mild headaches and restlessness.  All of these symptoms will get better with time.    If you experience any of the following, call the Pain Clinic during work hours (Mon-Friday 8-4:30 pm) at [phone] or the Provider Line after hours at [phone]:  -Fever over 100 degree F  -Swelling, bleeding, redness, drainage, warmth at the  injection site  -Progressive weakness or numbness in your legs or arms  -Loss of bowel or bladder function  -Unusual new onset of pain that is not improving

## 2020-12-21 NOTE — PROGRESS NOTES
Pre procedure Diagnosis: lumbar radiculopathy   Post procedure Diagnosis: Same  Procedure performed: left L3 transforaminal epidural steroid injection   Anesthesia: none  Complications: none  Operators: Chen Daley MD and Ignacio Orosco MD     Indications:   Mina Ames is a 65 year old male was sent by Dr. Gomes for epidural steroid injection.  They have a history of pain in the low back which started 6 weeks ago and radiates to the front on the leg and thigh tracking medially  No weakness, mostly just pain.  Exam shows no weakness and normal symmetric strength and no sensory changes and they have tried conservative treatment including medications, PT (for knee).    MRI was done on 12/9/20 which showed   1. Multilevel degenerative disc disease and facet arthropathy of the  lumbar spine, as described.  2. Particular note is made of a disc bulge with superimposed cranially  migrating left foraminal disc extrusion at L3-L4, which in combination  with facet arthropathy results in moderate left L3-L4 neural foraminal  stenosis. There are lesser degrees of neural foraminal narrowing  elsewhere.  3. No significant central spinal canal stenosis.  4. Please see the body of the report for additional details, including  level by level findings.     Options/alternatives, benefits and risks were discussed with the patient including bleeding, infection, tissue trauma, numbness, weakness, paralysis, spinal cord injury, radiation exposure, headache and reaction to medications. Questions were answered to his satisfaction and he agrees to proceed. Voluntary informed consent was obtained and signed.     Vitals were reviewed: Yes  Allergies were reviewed:  Yes   Medications were reviewed:  Yes   Pre-procedure pain score: 5/10    Procedure:  After getting informed consent, patient was brought into the procedure suite and was placed in a prone position on the procedure table.   A Pause for the Cause was performed.  Patient was  prepped and draped in sterile fashion.     After identifying the left L3 neuroforamen, the C-arm was rotated to a left lateral oblique angle.  A total of 4ml of Lidocaine 1% was used to anesthetize the skin and the needle track at a skin entry site coaxial with the fluoroscopy beam, and overriding the superior aspect of the neuroforamen.  A 22 gauge 5 inch spinal needle was advanced under intermittent fluoroscopy until it entered the foramen superiorly.    The position was then inspected from anteroposterior and lateral views, and the needle adjusted appropriately.  A total of 1ml of Omnipaque-300 was injected, confirming appropriate position, with spread into the nerve root sheath and the epidural space, with no intravascular uptake. 9ml was wasted    1.5ml of 0.5% bupivacaine with 10mg of dexamethasone was injected.  The needle was flushed with lidocaine and removed.    During the procedure, there was a paresthesia. Associated with pressure only, improved with slowing  Hemostasis was achieved, the area was cleaned, and bandaids were placed when appropriate.  The patient tolerated the procedure well, and was taken to the recovery room.    Images were saved to PACS.    Post-procedure pain score: 4/10  Follow-up includes:   -f/u phone call in one week  -f/u with referring provider    Chen Daley MD  [facility]

## 2020-12-21 NOTE — NURSING NOTE
Discharge Information    IV Discontiued Time:  NA    Amount of Fluid Infused:  NA    Discharge Criteria = When patient returns to baseline or as per MD order    Consciousness:  Pt is fully awake    Circulation:  BP +/- 20% of pre-procedure level    Respiration:  Patient is able to breathe deeply    O2 Sat:  Patient is able to maintain O2 Sat >92% on room air    Activity:  Moves 4 extremities on command    Ambulation:  Patient is able to stand and walk or stand and pivot into wheelchair    Dressing:  Clean/dry or No Dressing    Notes:   Discharge instructions and AVS given to patient    Patient meets criteria for discharge?  YES    Admitted to PCU?  No    Responsible adult present to accompany patient home?  Yes    Signature/Title:    Geronimo Melgar RN  RN Care Coordinator  [facility] [place]

## 2020-12-28 ENCOUNTER — MYC MEDICAL ADVICE (OUTPATIENT)
Dept: ORTHOPEDICS | Facility: CLINIC | Age: 65
End: 2020-12-28

## 2020-12-28 NOTE — LETTER
[facility] [place]  [address]  [place] [address]  Phone: [phone]  Fax: [phone]      December 28, 2020        RE: Mina Ames  [address] [facility] [address]        To whom it may concern:    Mina Ames was seen in clinic today. The employee is UNABLE to return to work at this time through 1/5/2021due to a medical condition and additional treatment. He will be reevaluated at that time.           Sincerely,                Geronimo MARTINEZ

## 2021-01-05 ENCOUNTER — OFFICE VISIT (OUTPATIENT)
Dept: ORTHOPEDICS | Facility: CLINIC | Age: 66
End: 2021-01-05
Payer: COMMERCIAL

## 2021-01-05 VITALS
SYSTOLIC BLOOD PRESSURE: 134 MMHG | BODY MASS INDEX: 33.33 KG/M2 | WEIGHT: 225 LBS | DIASTOLIC BLOOD PRESSURE: 78 MMHG | HEIGHT: 69 IN

## 2021-01-05 DIAGNOSIS — M54.42 ACUTE LEFT-SIDED LOW BACK PAIN WITH LEFT-SIDED SCIATICA: Primary | ICD-10-CM

## 2021-01-05 DIAGNOSIS — M54.16 LUMBAR RADICULOPATHY: ICD-10-CM

## 2021-01-05 PROCEDURE — 99213 OFFICE O/P EST LOW 20 MIN: CPT | Performed by: PEDIATRICS

## 2021-01-05 ASSESSMENT — MIFFLIN-ST. JEOR: SCORE: 1800.97

## 2021-01-05 NOTE — LETTER
1/5/2021         RE: Mina Ames  [address]  [place] [facility] [place] [facility] [address]        Dear Colleague,    Thank you for referring your patient, Mina Ames, to the [facility] [place]. Please see a copy of my visit note below.    Sports Medicine Clinic Visit    PCP: Escobar Curry    Mina Ames is a 65 year old male who is seen in f/u up for Acute left-sided low back pain with left-sided sciatica. Since last visit on 12/04/2020 patient has undergone an MRI as well as a left L3 transforaminal epidural steroid injection with Dr. Daley on 12/21/20.   He does feel he has had good relief from the injection.   **  Reviewed injection note from Dr Daley.  Has done some PT already.      Review of Systems  All other systems reviewed and are negative unless noted above.    Past Medical History:   Diagnosis Date     Adult BMI 30+      DJD (degenerative joint disease) of knee     xray Oct., 2009     Hyperlipidemia      Squamous cell skin cancer 2007    chest     Past Surgical History:   Procedure Laterality Date     C APPENDECTOMY  1971     COLONOSCOPY  2016     SURGICAL HISTORY OF -   1995    wrist     Family History   Problem Relation Age of Onset     Hypertension Mother      Heart Disease Father      Hypertension Father      Lipids Father      Depression Father      Coronary Artery Disease Father         Bypass surgery - 3      Social History     Socioeconomic History     Marital status:      Spouse name: Not on file     Number of children: Not on file     Years of education: Not on file     Highest education level: Not on file   Occupational History     Not on file   Social Needs     Financial resource strain: Not on file     Food insecurity     Worry: Not on file     Inability: Not on file     Transportation needs     Medical: Not on file     Non-medical: Not on file   Tobacco Use     Smoking status: Never Smoker     Smokeless tobacco: Never Used   Substance and  "Sexual Activity     Alcohol use: No     Drug use: No     Sexual activity: Not Currently     Partners: Female     Comment: defer to md   Lifestyle     Physical activity     Days per week: Not on file     Minutes per session: Not on file     Stress: Not on file   Relationships     Social connections     Talks on phone: Not on file     Gets together: Not on file     Attends Yazidi service: Not on file     Active member of club or organization: Not on file     Attends meetings of clubs or organizations: Not on file     Relationship status: Not on file     Intimate partner violence     Fear of current or ex partner: Not on file     Emotionally abused: Not on file     Physically abused: Not on file     Forced sexual activity: Not on file   Other Topics Concern     Parent/sibling w/ CABG, MI or angioplasty before 65F 55M? No   Social History Narrative     Not on file         Objective  /78   Ht 1.753 m (5' 9\")   Wt 102.1 kg (225 lb)   BMI 33.23 kg/m      GENERAL APPEARANCE: healthy, alert and no distress   GAIT: NORMAL  SKIN: no suspicious lesions or rashes  NEURO: Normal strength and tone, mentation intact and speech normal  PSYCH:  mentation appears normal and affect normal/bright  HEENT: no scleral icterus  CV: distal perfusion intact  RESP: nonlabored breathing      Exam  Ambulating independently, getting around better  LE strength grossly intact      Radiology  Visualized the MRI study noted below, and reviewed the images and the report with the patient.      MRI LUMBAR SPINE WITHOUT CONTRAST   12/9/2020 5:49 PM      HISTORY: Ongoing left lower extremity pain, posterior hip pain,  persistent symptoms despite conservative treatment. Acute left-sided  low back pain with left-sided sciatica.     TECHNIQUE: Multiplanar multisequence MRI of the lumbar spine without  contrast.      COMPARISON: Lumbar spine radiographs dated 11/17/2020.      FINDINGS:  Five lumbar vertebral bodies are presumed. Grade " 1  anterolisthesis of L4 on L5 measuring approximately 3 mm. Sagittal  alignment is otherwise normal. Normal vertebral body heights. Mild  Modic type II degenerative endplate change at T11-T12 and T12-L1, and  also at L3-L4 and L4-L5. Small presumed intraosseous hemangioma or  focal fatty marrow in the L3 vertebral body. Normal appearance of the  distal spinal cord with the conus terminating at L2. Visualized  paraspinous soft tissues and the visualized aspects of the bony pelvis  are unremarkable.     Segmental analysis:  T12-L1: Normal disc height and signal. Shallow symmetric disc bulge.  No herniation. Normal facets. No spinal canal or neural foraminal  stenosis.     L1-L2: Normal disc height and signal. No herniation. Normal facets. No  spinal canal or neural foraminal stenosis.     L2-L3: Normal disc height and signal. There is a small disc bulge  slightly eccentric to the left. Mild facet arthropathy and ligamentum  flavum thickening. No spinal canal stenosis. No significant neural  foraminal stenosis.     L3-L4: Mild disc height loss. There is a disc bulge eccentric to the  left with what appears to be a superimposed cranially migrating left  foraminal disc extrusion (series 5 image 25, series 2 image 12). Mild  to moderate facet arthropathy and mild ligamentum flavum thickening.  No spinal canal stenosis. Moderate left neural foraminal stenosis with  the anterior superior aspect of the extrusion at least contacting the  exiting left L3 nerve root. There is mild right neural foraminal  stenosis.     L4-L5: Mild disc height loss. Small symmetric disc bulge. Moderate to  severe bilateral facet arthropathy. No spinal canal stenosis. Mild to  moderate left and mild right neural foraminal stenosis.     L5-S1: Normal disc height and signal. No herniation. Moderate/moderate  to severe bilateral facet arthropathy. No spinal canal stenosis. No  significant neural foraminal stenosis.                                                                       IMPRESSION:  1. Multilevel degenerative disc disease and facet arthropathy of the  lumbar spine, as described.  2. Particular note is made of a disc bulge with superimposed cranially  migrating left foraminal disc extrusion at L3-L4, which in combination  with facet arthropathy results in moderate left L3-L4 neural foraminal  stenosis. There are lesser degrees of neural foraminal narrowing  elsewhere.  3. No significant central spinal canal stenosis.  4. Please see the body of the report for additional details, including  level by level findings.     PAT BUTCHER    Assessment:  1. Acute left-sided low back pain with left-sided sciatica    2. Lumbar radiculopathy        Plan:  Discussed the assessment with the patient.  Improving lumbar radiculopathy.  Keep up with HEP from PT.  Updated letter for work; he feels ready to return without restrictions, and notes that work options for him may be limited with COVID still around. Return to work without restrictions.  Follow up: will leave as needed. We discussed if symptoms recur, contact clinic; future considerations may include repeat injection, possibly return to PT, or even possibly referral to surgeon, pending course.  Questions answered. Discussed signs and symptoms that may indicate more serious issues; the patient was instructed to seek appropriate care if noted. Mina indicates understanding of these issues and agrees with the plan.      Geronimo Gomes, DO, CAQ          Patient Instructions   Good to see improvement from the injection  Keep up with home exercises from physical therapy  Updated letter for work  Follow up is as needed      This note consists of symbols derived from keyboarding, dictation and/or voice recognition software. As a result, there may be errors in the script that have gone undetected. Please consider this when interpreting information found in this chart.          Again, thank you for allowing me  to participate in the care of your patient.        Sincerely,        Geronimo Gomes, DO

## 2021-01-05 NOTE — PATIENT INSTRUCTIONS
Good to see improvement from the injection  Keep up with home exercises from physical therapy  Updated letter for work  Follow up is as needed

## 2021-01-05 NOTE — PROGRESS NOTES
Sports Medicine Clinic Visit    PCP: Escobar Curry CLAYTON Ames is a 65 year old male who is seen in f/u up for Acute left-sided low back pain with left-sided sciatica. Since last visit on 12/04/2020 patient has undergone an MRI as well as a left L3 transforaminal epidural steroid injection with Dr. Daley on 12/21/20.   He does feel he has had good relief from the injection.   **  Reviewed injection note from Dr Daley.  Has done some PT already.      Review of Systems  All other systems reviewed and are negative unless noted above.    Past Medical History:   Diagnosis Date     Adult BMI 30+      DJD (degenerative joint disease) of knee     xray Oct., 2009     Hyperlipidemia      Squamous cell skin cancer 2007    chest     Past Surgical History:   Procedure Laterality Date     C APPENDECTOMY  1971     COLONOSCOPY  2016     SURGICAL HISTORY OF -   1995    wrist     Family History   Problem Relation Age of Onset     Hypertension Mother      Heart Disease Father      Hypertension Father      Lipids Father      Depression Father      Coronary Artery Disease Father         Bypass surgery - 3      Social History     Socioeconomic History     Marital status:      Spouse name: Not on file     Number of children: Not on file     Years of education: Not on file     Highest education level: Not on file   Occupational History     Not on file   Social Needs     Financial resource strain: Not on file     Food insecurity     Worry: Not on file     Inability: Not on file     Transportation needs     Medical: Not on file     Non-medical: Not on file   Tobacco Use     Smoking status: Never Smoker     Smokeless tobacco: Never Used   Substance and Sexual Activity     Alcohol use: No     Drug use: No     Sexual activity: Not Currently     Partners: Female     Comment: defer to md   Lifestyle     Physical activity     Days per week: Not on file     Minutes per session: Not on file     Stress: Not on file  "  Relationships     Social connections     Talks on phone: Not on file     Gets together: Not on file     Attends Sikhism service: Not on file     Active member of club or organization: Not on file     Attends meetings of clubs or organizations: Not on file     Relationship status: Not on file     Intimate partner violence     Fear of current or ex partner: Not on file     Emotionally abused: Not on file     Physically abused: Not on file     Forced sexual activity: Not on file   Other Topics Concern     Parent/sibling w/ CABG, MI or angioplasty before 65F 55M? No   Social History Narrative     Not on file         Objective  /78   Ht 1.753 m (5' 9\")   Wt 102.1 kg (225 lb)   BMI 33.23 kg/m      GENERAL APPEARANCE: healthy, alert and no distress   GAIT: NORMAL  SKIN: no suspicious lesions or rashes  NEURO: Normal strength and tone, mentation intact and speech normal  PSYCH:  mentation appears normal and affect normal/bright  HEENT: no scleral icterus  CV: distal perfusion intact  RESP: nonlabored breathing      Exam  Ambulating independently, getting around better  LE strength grossly intact      Radiology  Visualized the MRI study noted below, and reviewed the images and the report with the patient.      MRI LUMBAR SPINE WITHOUT CONTRAST   12/9/2020 5:49 PM      HISTORY: Ongoing left lower extremity pain, posterior hip pain,  persistent symptoms despite conservative treatment. Acute left-sided  low back pain with left-sided sciatica.     TECHNIQUE: Multiplanar multisequence MRI of the lumbar spine without  contrast.      COMPARISON: Lumbar spine radiographs dated 11/17/2020.      FINDINGS:  Five lumbar vertebral bodies are presumed. Grade 1  anterolisthesis of L4 on L5 measuring approximately 3 mm. Sagittal  alignment is otherwise normal. Normal vertebral body heights. Mild  Modic type II degenerative endplate change at T11-T12 and T12-L1, and  also at L3-L4 and L4-L5. Small presumed intraosseous " hemangioma or  focal fatty marrow in the L3 vertebral body. Normal appearance of the  distal spinal cord with the conus terminating at L2. Visualized  paraspinous soft tissues and the visualized aspects of the bony pelvis  are unremarkable.     Segmental analysis:  T12-L1: Normal disc height and signal. Shallow symmetric disc bulge.  No herniation. Normal facets. No spinal canal or neural foraminal  stenosis.     L1-L2: Normal disc height and signal. No herniation. Normal facets. No  spinal canal or neural foraminal stenosis.     L2-L3: Normal disc height and signal. There is a small disc bulge  slightly eccentric to the left. Mild facet arthropathy and ligamentum  flavum thickening. No spinal canal stenosis. No significant neural  foraminal stenosis.     L3-L4: Mild disc height loss. There is a disc bulge eccentric to the  left with what appears to be a superimposed cranially migrating left  foraminal disc extrusion (series 5 image 25, series 2 image 12). Mild  to moderate facet arthropathy and mild ligamentum flavum thickening.  No spinal canal stenosis. Moderate left neural foraminal stenosis with  the anterior superior aspect of the extrusion at least contacting the  exiting left L3 nerve root. There is mild right neural foraminal  stenosis.     L4-L5: Mild disc height loss. Small symmetric disc bulge. Moderate to  severe bilateral facet arthropathy. No spinal canal stenosis. Mild to  moderate left and mild right neural foraminal stenosis.     L5-S1: Normal disc height and signal. No herniation. Moderate/moderate  to severe bilateral facet arthropathy. No spinal canal stenosis. No  significant neural foraminal stenosis.                                                                      IMPRESSION:  1. Multilevel degenerative disc disease and facet arthropathy of the  lumbar spine, as described.  2. Particular note is made of a disc bulge with superimposed cranially  migrating left foraminal disc extrusion at  L3-L4, which in combination  with facet arthropathy results in moderate left L3-L4 neural foraminal  stenosis. There are lesser degrees of neural foraminal narrowing  elsewhere.  3. No significant central spinal canal stenosis.  4. Please see the body of the report for additional details, including  level by level findings.     PAT BUTCHER    Assessment:  1. Acute left-sided low back pain with left-sided sciatica    2. Lumbar radiculopathy        Plan:  Discussed the assessment with the patient.  Improving lumbar radiculopathy.  Keep up with HEP from PT.  Updated letter for work; he feels ready to return without restrictions, and notes that work options for him may be limited with COVID still around. Return to work without restrictions.  Follow up: will leave as needed. We discussed if symptoms recur, contact clinic; future considerations may include repeat injection, possibly return to PT, or even possibly referral to surgeon, pending course.  Questions answered. Discussed signs and symptoms that may indicate more serious issues; the patient was instructed to seek appropriate care if noted. Mina indicates understanding of these issues and agrees with the plan.      Geronimo Gomes DO, CAQ          Patient Instructions   Good to see improvement from the injection  Keep up with home exercises from physical therapy  Updated letter for work  Follow up is as needed      This note consists of symbols derived from keyboarding, dictation and/or voice recognition software. As a result, there may be errors in the script that have gone undetected. Please consider this when interpreting information found in this chart.

## 2021-01-05 NOTE — LETTER
[facility] [place]  [address]  [place] [address]  Phone: [phone]  Fax: [phone]      January 5, 2021        RE: Mina Ames  [address] [place] [facility] [address]        To whom it may concern:    Mina Ames was seen in clinic today. The employee is ABLE to return to work next scheduled work date, and is able to return to work with no restrictions.           Sincerely,                Geronimo MARTINEZ

## 2021-01-09 ENCOUNTER — HEALTH MAINTENANCE LETTER (OUTPATIENT)
Age: 66
End: 2021-01-09

## 2021-01-30 ASSESSMENT — ENCOUNTER SYMPTOMS
ARTHRALGIAS: 1
SHORTNESS OF BREATH: 0
EYE PAIN: 0
COUGH: 0
FEVER: 0
DYSURIA: 0
DIARRHEA: 0
FREQUENCY: 0
WEAKNESS: 0
PARESTHESIAS: 0
SORE THROAT: 0
ABDOMINAL PAIN: 0
DIZZINESS: 0
PALPITATIONS: 0
HEADACHES: 0
JOINT SWELLING: 0
NERVOUS/ANXIOUS: 0
HEARTBURN: 0
HEMATOCHEZIA: 0
HEMATURIA: 0
CONSTIPATION: 0
NAUSEA: 0
CHILLS: 0
MYALGIAS: 1

## 2021-02-02 ENCOUNTER — OFFICE VISIT (OUTPATIENT)
Dept: FAMILY MEDICINE | Facility: CLINIC | Age: 66
End: 2021-02-02
Payer: COMMERCIAL

## 2021-02-02 VITALS
HEIGHT: 69 IN | BODY MASS INDEX: 33.74 KG/M2 | HEART RATE: 92 BPM | TEMPERATURE: 97 F | DIASTOLIC BLOOD PRESSURE: 89 MMHG | WEIGHT: 227.8 LBS | SYSTOLIC BLOOD PRESSURE: 134 MMHG

## 2021-02-02 DIAGNOSIS — Z82.49 FAMILY HISTORY OF ASCVD: ICD-10-CM

## 2021-02-02 DIAGNOSIS — Z00.00 ROUTINE GENERAL MEDICAL EXAMINATION AT A HEALTH CARE FACILITY: Primary | ICD-10-CM

## 2021-02-02 DIAGNOSIS — I10 HYPERTENSION GOAL BP (BLOOD PRESSURE) < 140/90: ICD-10-CM

## 2021-02-02 DIAGNOSIS — E78.5 HYPERLIPIDEMIA LDL GOAL <160: ICD-10-CM

## 2021-02-02 DIAGNOSIS — G89.29 CHRONIC LOW BACK PAIN WITHOUT SCIATICA, UNSPECIFIED BACK PAIN LATERALITY: ICD-10-CM

## 2021-02-02 DIAGNOSIS — M54.50 CHRONIC LOW BACK PAIN WITHOUT SCIATICA, UNSPECIFIED BACK PAIN LATERALITY: ICD-10-CM

## 2021-02-02 DIAGNOSIS — Z23 NEED FOR SHINGLES VACCINE: ICD-10-CM

## 2021-02-02 PROCEDURE — 80048 BASIC METABOLIC PNL TOTAL CA: CPT | Performed by: PHYSICIAN ASSISTANT

## 2021-02-02 PROCEDURE — 99396 PREV VISIT EST AGE 40-64: CPT | Performed by: PHYSICIAN ASSISTANT

## 2021-02-02 PROCEDURE — G0103 PSA SCREENING: HCPCS | Performed by: PHYSICIAN ASSISTANT

## 2021-02-02 PROCEDURE — 36415 COLL VENOUS BLD VENIPUNCTURE: CPT | Performed by: PHYSICIAN ASSISTANT

## 2021-02-02 PROCEDURE — 99213 OFFICE O/P EST LOW 20 MIN: CPT | Mod: 25 | Performed by: PHYSICIAN ASSISTANT

## 2021-02-02 PROCEDURE — 80061 LIPID PANEL: CPT | Performed by: PHYSICIAN ASSISTANT

## 2021-02-02 RX ORDER — HYDROCHLOROTHIAZIDE 25 MG/1
25 TABLET ORAL DAILY
Qty: 90 TABLET | Refills: 1 | Status: SHIPPED | OUTPATIENT
Start: 2021-02-02 | End: 2021-02-15

## 2021-02-02 RX ORDER — CELECOXIB 200 MG/1
200 CAPSULE ORAL DAILY
Qty: 90 CAPSULE | Refills: 0 | Status: SHIPPED | OUTPATIENT
Start: 2021-02-02 | End: 2021-02-02

## 2021-02-02 RX ORDER — CELECOXIB 200 MG/1
200 CAPSULE ORAL DAILY
Qty: 90 CAPSULE | Refills: 0 | Status: SHIPPED | OUTPATIENT
Start: 2021-02-02 | End: 2021-02-15

## 2021-02-02 RX ORDER — ZOSTER VACCINE RECOMBINANT, ADJUVANTED 50 MCG/0.5
1 KIT INTRAMUSCULAR ONCE
Qty: 0.5 ML | Refills: 0 | Status: SHIPPED | OUTPATIENT
Start: 2021-02-02 | End: 2021-02-02

## 2021-02-02 ASSESSMENT — ENCOUNTER SYMPTOMS
HEARTBURN: 0
HEADACHES: 0
JOINT SWELLING: 0
EYE PAIN: 0
CHILLS: 0
NAUSEA: 0
NERVOUS/ANXIOUS: 0
WEAKNESS: 0
FEVER: 0
DIARRHEA: 0
ARTHRALGIAS: 1
HEMATURIA: 0
DYSURIA: 0
COUGH: 0
ABDOMINAL PAIN: 0
MYALGIAS: 1
FREQUENCY: 0
PALPITATIONS: 0
PARESTHESIAS: 0
SORE THROAT: 0
CONSTIPATION: 0
HEMATOCHEZIA: 0
SHORTNESS OF BREATH: 0
DIZZINESS: 0

## 2021-02-02 ASSESSMENT — MIFFLIN-ST. JEOR: SCORE: 1813.67

## 2021-02-02 NOTE — PATIENT INSTRUCTIONS
1. To schedule CT of heart  Phone: [phone]    Preventive Health Recommendations  Male Ages 50 - 64    Yearly exam:             See your health care provider every year in order to  o   Review health changes.   o   Discuss preventive care.    o   Review your medicines if your doctor has prescribed any.     Have a cholesterol test every 5 years, or more frequently if you are at risk for high cholesterol/heart disease.     Have a diabetes test (fasting glucose) every three years. If you are at risk for diabetes, you should have this test more often.     Have a colonoscopy at age 50, or have a yearly FIT test (stool test). These exams will check for colon cancer.      Talk with your health care provider about whether or not a prostate cancer screening test (PSA) is right for you.    You should be tested each year for STDs (sexually transmitted diseases), if you re at risk.     Shots: Get a flu shot each year. Get a tetanus shot every 10 years.     Nutrition:    Eat at least 5 servings of fruits and vegetables daily.     Eat whole-grain bread, whole-wheat pasta and brown rice instead of white grains and rice.     Get adequate Calcium and Vitamin D.     Lifestyle    Exercise for at least 150 minutes a week (30 minutes a day, 5 days a week). This will help you control your weight and prevent disease.     Limit alcohol to one drink per day.     No smoking.     Wear sunscreen to prevent skin cancer.     See your dentist every six months for an exam and cleaning.     See your eye doctor every 1 to 2 years.

## 2021-02-02 NOTE — TELEPHONE ENCOUNTER
Central Prior Authorization Team   Phone: [phone]      PA Initiation    Medication: celecoxib-Initiated  Insurance Company: [employer] - Phone [phone] Fax [phone]  Pharmacy Filling the Rx: [place], [place] - [address] [place] [address].  Filling Pharmacy Phone: [phone]  Filling Pharmacy Fax:    Start Date: 2/2/2021

## 2021-02-02 NOTE — PROGRESS NOTES
"SUBJECTIVE:   CC: Mina Ames is an 65 year old male who presents for preventative health visit.     Patient has been advised of split billing requirements and indicates understanding: Yes  Healthy Habits:     Getting at least 3 servings of Calcium per day:  Yes    Bi-annual eye exam:  Yes    Dental care twice a year:  Yes    Sleep apnea or symptoms of sleep apnea:  None    Diet:  Regular (no restrictions)    Frequency of exercise:  2-3 days/week    Duration of exercise:  15-30 minutes    Taking medications regularly:  Yes    Medication side effects:  None    PHQ-2 Total Score: 0    Additional concerns today:  Yes    1. Persistent left radicular symptoms, low back pain. MRI showed disk extrusion L3/L4. One injection helped for 2 weeks. Reports persistent left leg pain along anterior leg. \"mild to moderate\"  2. 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 They require treatment        Today's PHQ-2 Score:   PHQ-2 ( 1999 Pfizer) 1/30/2021   Q1: Little interest or pleasure in doing things 0   Q2: Feeling down, depressed or hopeless 0   PHQ-2 Score 0   Q1: Little interest or pleasure in doing things Not at all   Q2: Feeling down, depressed or hopeless Not at all   PHQ-2 Score 0       Abuse: Current or Past(Physical, Sexual or Emotional)- No  Do you feel safe in your environment? Yes    Have you ever done Advance Care Planning? (For example, a Health Directive, POLST, or a discussion with a medical provider or your loved ones about your wishes): No, advance care planning information given to patient to review.  Patient plans to discuss their wishes with loved ones or provider.      Social History     Tobacco Use     Smoking status: Never Smoker     Smokeless tobacco: Never Used   Substance Use Topics     Alcohol use: No     If you drink alcohol do you typically have >3 drinks per day or >7 drinks per week? No    Alcohol Use 2/2/2021   Prescreen: >3 drinks/day or >7 drinks/week? -   Prescreen: >3 drinks/day or " ">7 drinks/week? No       Last PSA:   PSA   Date Value Ref Range Status   08/23/2019 0.56 0 - 4 ug/L Final     Comment:     Assay Method:  Chemiluminescence using Siemens Vista analyzer       Reviewed orders with patient. Reviewed health maintenance and updated orders accordingly - Yes  Lab work is in process    Reviewed and updated as needed this visit by clinical staff  Tobacco  Allergies  Meds   Med Hx  Surg Hx  Fam Hx  Soc Hx        Reviewed and updated as needed this visit by Provider  Tobacco                   Review of Systems   Constitutional: Negative for chills and fever.   HENT: Negative for congestion, ear pain, hearing loss and sore throat.    Eyes: Positive for visual disturbance. Negative for pain.   Respiratory: Negative for cough and shortness of breath.    Cardiovascular: Negative for chest pain, palpitations and peripheral edema.   Gastrointestinal: Negative for abdominal pain, constipation, diarrhea, heartburn, hematochezia and nausea.   Genitourinary: Positive for impotence. Negative for discharge, dysuria, frequency, genital sores, hematuria and urgency.   Musculoskeletal: Positive for arthralgias and myalgias. Negative for joint swelling.   Skin: Negative for rash.   Neurological: Negative for dizziness, weakness, headaches and paresthesias.   Psychiatric/Behavioral: Negative for mood changes. The patient is not nervous/anxious.        OBJECTIVE:   /89 (BP Location: Right arm, Patient Position: Chair, Cuff Size: Adult Large)   Pulse 92   Temp 97  F (36.1  C) (Tympanic)   Ht 1.753 m (5' 9\")   Wt 103.3 kg (227 lb 12.8 oz)   BMI 33.64 kg/m      Physical Exam  GENERAL: healthy, alert and no distress  EYES: Eyes grossly normal to inspection, PERRL and conjunctivae and sclerae normal  HENT: ear canals and TM's normal, nose and mouth without ulcers or lesions  NECK: no adenopathy, no asymmetry, masses, or scars and thyroid normal to palpation  RESP: lungs clear to auscultation - no " rales, rhonchi or wheezes  CV: regular rate and rhythm, normal S1 S2, no S3 or S4, no murmur, click or rub, no peripheral edema and peripheral pulses strong  ABDOMEN: soft, nontender, no hepatosplenomegaly, no masses and bowel sounds normal  MS: no gross musculoskeletal defects noted, no edema  SKIN: no suspicious lesions or rashes  NEURO: Normal strength and tone, mentation intact and speech normal  PSYCH: mentation appears normal, affect normal/bright  LYMPH: no cervical, supraclavicular, axillary, or inguinal adenopathy    Diagnostic Test Results:  Labs reviewed in Epic    ASSESSMENT/PLAN:   (Z00.00) Routine general medical examination at a health care facility  (primary encounter diagnosis)  Comment: Well person   Plan: PSA, screen, Basic metabolic panel  (Ca, Cl,         CO2, Creat, Gluc, K, Na, BUN), Lipid panel         reflex to direct LDL Fasting          (E78.5) Hyperlipidemia LDL goal <160  Comment:   Plan:   Lab Results   Component Value Date    LDL 64 10/26/2020       (M54.5,  G89.29) Chronic low back pain without sciatica, unspecified back pain laterality  Comment:   Plan: celecoxib (CELEBREX) 200 MG capsule, PAIN         MANAGEMENT REFERRAL, DISCONTINUED: celecoxib         (CELEBREX) 200 MG capsule        Chronic. Ongoing. Recommend something as he's dealing with pain and using a lot of ibuprofen. Can try Celebrex. This prescription is given with a discussion of side effects, risks and proper use.  Instructions are given to follow up if not improving or symptoms change or worsen as discussed.     (I10) Hypertension goal BP (blood pressure) < 140/90  Comment:   Plan: hydrochlorothiazide (HYDRODIURIL) 25 MG tablet        Recommend treatment. Will start hydrochlorothiazide. 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     (Z23) Need for shingles vaccine  Comment:   Plan: zoster vaccine recombinant adjuvanted         (SHINGRIX) injection        Checking at pharmacy on coverage     (Z82.49) Family  "history of ASCVD  Comment:   Plan: CT Coronary Calcium Scan        Father at 70 had CABG. Options reviewed. Can do CT scan coronary scoring.     Patient has been advised of split billing requirements and indicates understanding: Yes  COUNSELING:   Reviewed preventive health counseling, as reflected in patient instructions    Estimated body mass index is 33.64 kg/m  as calculated from the following:    Height as of this encounter: 1.753 m (5' 9\").    Weight as of this encounter: 103.3 kg (227 lb 12.8 oz).     He reports that he has never smoked. He has never used smokeless tobacco.      Counseling Resources:  ATP IV Guidelines  Pooled Cohorts Equation Calculator  FRAX Risk Assessment  ICSI Preventive Guidelines  Dietary Guidelines for Americans, 2010  USDA's MyPlate  ASA Prophylaxis  Lung CA Screening    [facility]  "

## 2021-02-02 NOTE — TELEPHONE ENCOUNTER
Prior Authorization Approval    Authorization Effective Date: 1/3/2021  Authorization Expiration Date: 2/2/2022  Medication: celecoxib-APPROVED  Approved Dose/Quantity:   Reference #:     Insurance Company: [employer] - Phone [phone] Fax [phone]  Expected CoPay:       CoPay Card Available:      Foundation Assistance Needed:    Which Pharmacy is filling the prescription (Not needed for infusion/clinic administered): [facility] - [place], [place] - [address] [place] [address]  Pharmacy Notified: Yes  Patient Notified: No    Pharmacy will notify patient when medication is ready.

## 2021-02-02 NOTE — TELEPHONE ENCOUNTER
Prior Authorization Retail Medication Request    Medication/Dose: celecoxib  ICD code (if different than what is on RX):    Previously Tried and Failed:    Rationale:      Insurance Name:  [facility]/[employer]  Insurance ID:  [id number]      Pharmacy Information (if different than what is on RX)  Name:    Phone:      Dona Mcneill PharmD, [facility]  Pharmacist Manager   [facility]  [phone]

## 2021-02-03 ENCOUNTER — TELEPHONE (OUTPATIENT)
Dept: PALLIATIVE MEDICINE | Facility: CLINIC | Age: 66
End: 2021-02-03

## 2021-02-03 LAB
ANION GAP SERPL CALCULATED.3IONS-SCNC: 5 MMOL/L (ref 3–14)
BUN SERPL-MCNC: 24 MG/DL (ref 7–30)
CALCIUM SERPL-MCNC: 10 MG/DL (ref 8.5–10.1)
CHLORIDE SERPL-SCNC: 109 MMOL/L (ref 94–109)
CHOLEST SERPL-MCNC: 165 MG/DL
CO2 SERPL-SCNC: 25 MMOL/L (ref 20–32)
CREAT SERPL-MCNC: 1.25 MG/DL (ref 0.66–1.25)
GFR SERPL CREATININE-BSD FRML MDRD: 60 ML/MIN/{1.73_M2}
GLUCOSE SERPL-MCNC: 119 MG/DL (ref 70–99)
HDLC SERPL-MCNC: 50 MG/DL
LDLC SERPL CALC-MCNC: 86 MG/DL
NONHDLC SERPL-MCNC: 115 MG/DL
POTASSIUM SERPL-SCNC: 4.7 MMOL/L (ref 3.4–5.3)
PSA SERPL-ACNC: 0.84 UG/L (ref 0–4)
SODIUM SERPL-SCNC: 139 MMOL/L (ref 133–144)
TRIGL SERPL-MCNC: 144 MG/DL

## 2021-02-03 NOTE — TELEPHONE ENCOUNTER
Screening Questions for Radiology Injections:    Injection to be done at which interventional clinic site? [facility]    If [facility] location, tell patient that this procedure requires a COVID-19 lab test be done within 4 days of the procedure. Would you still like to move forward with scheduling the procedure?  Not Applicable   If YES, let patient know that someone will call them to schedule the COVID-19 test and that they will only receive a call back if the result is positive. Route to nursing to enter order.     Instruct patient to arrive as directed prior to the scheduled appointment time:    [place] minutes before      [place]: 30 minutes before; if IV needed 1 hour before     Procedure ordered by Patricio    Procedure ordered? LESI      Transforaminal Cervical MINDY - no pain provider currently performing    As a reminder, receiving steroids can decrease your body's ability to fight infection.   Would you still like to move forward with scheduling the injection?  Yes    What insurance would patient like us to bill for this procedure? BCBS      Worker's comp or MVA (motor vehicle accident) -Any injection DO NOT SCHEDULE and route to Hanane Seo.      [employer] insurance - For SI joint injections, DO NOT SCHEDULE and route Hanane Seo.       ALL BCBS, [employer] and [employer]-Route to Hanane for review DO NOT SCHEDULE      IF SCHEDULING IN [place] AND NEEDS A PA, IT IS OKAY TO SCHEDULE. [place] HANDLES THEIR OWN PA'S AFTER THE PATIENT IS SCHEDULED. PLEASE SCHEDULE AT LEAST 1 WEEK OUT SO A PA CAN BE OBTAINED.    Any chance of pregnancy? Not Applicable   If YES, do NOT schedule and route to RN [place]    Is an  needed? No     Patient has a drive home? (mandatory) YES: INFORMED    Is patient taking any blood thinners (i.e. plavix, coumadin, jantoven, warfarin, heparin, pradaxa or dabigatran, etc)? No   If hold needed, do NOT schedule, route to RN pool     Is  patient taking any aspirin products (includes Excedrin and Fiorinal)? Yes - Pt takes 81mg daily; instructed to hold 0 day(s) prior to procedure.      If more than 325mg/day, OK to schedule; Instruct pt to decrease to less than 325 mg for 7 days AND route to RN pool    For CERVICAL procedures, hold all aspirin products for 6 days.     Tell pt that if aspirin product is not held for 6 days, the procedure WILL BE cancelled.      Does the patient have a bleeding or clotting disorder? No     If YES, okay to schedule AND route to RN nurse pool    For any patients with platelet count <100, must be forwarded to provider    Is patient diabetic?  No  If YES, instruct them to bring their glucometer.    Does patient have an active infection or treated for one within the past week? No     Is patient currently taking any antibiotics?  No     For patients on chronic, preventative, or prophylactic antibiotics, procedures may be scheduled.     For patients on antibiotics for active or recent infection:antibiotic course must have been completed for 4 days    Is patient currently taking any steroid medications? (i.e. Prednisone, Medrol)  No     For patients on steroid medications, course must have been completed for 4 days    Is patient actively being treated for cancer or immunocompromised? No  If YES, do NOT schedule and route to RN pool     Are you able to get on and off an exam table with minimal or no assistance? Yes  If NO, do NOT schedule and route to RN pool    Are you able to roll over and lay on your stomach with minimal or no assistance? Yes  If NO, do NOT schedule and route to RN pool     Any allergies to contrast dye, iodine, shellfish, or numbing and steroid medications? No  If YES, route to RN pool AND add allergy information to appointment notes    Allergies: Nkda [no known drug allergies]      Has the patient had a flu shot or any other vaccinations within 7 days before or after the procedure.  Yes - Shingles 2/2      Does patient have an MRI/CT?  YES: 2020  Check Procedure Scheduling Grid to see if required.      Was the MRI done within the last 3 years?  Yes    If yes, where was the MRI done i.e.[facility], [facility], [place], [facility] etc? [facility]      If no, do not schedule and route to RN pool    If MRI was not done at [place], [facility] or [facility] do NOT schedule and route to RN pool.      If pt has an imaging disc, the injection MAY be scheduled but pt has to bring disc to appt.     If they show up without the disc the injection cannot be done    Procedure Specific Instructions:      If celiac plexus block, informed patient NPO for 6 hours and that it is okay to take medications with sips of water, especially blood pressure medications  Not Applicable         If this is for a cervical procedure, informed patient that aspirin needs to be held for 6 days.   Not Applicable      If IV needed:    Do not schedule procedures requiring IV placement in the first appointment of the day or first appointment after lunch. Do NOT schedule at 0745, 0815 or 1245.     Instructed pt to arrive 30 minutes early for IV start if required. (Check Procedure Scheduling Grid)  Not Applicable    Reminders:      If you are started on any steroids or antibiotics between now and your appointment, you must contact us because the procedure may need to be cancelled.  Yes      For all procedures except radiofrequency ablations (RFAs) and spinal cord stimulator (SCS) trials, informed patient:    IV sedation is not provided for this procedure.  If you feel that an oral anti-anxiety medication is needed, you can discuss this further with your referring provider or primary care provider.  The Pain Clinic provider will discuss specifics of what the procedure includes at your appointment.  Most procedures last 10-20 minutes.  We use numbing medications to help with any discomfort during the procedure.  Not Applicable      For patients 85 or older we  recommend having an adult stay w/ them for the remainder of the day.       Does the patient have any questions?  NO  Miroslava Nielsen  [facility] [place]

## 2021-02-04 NOTE — TELEPHONE ENCOUNTER
PA pending additional information - please specify exact level, laterality and approach of injection.      Hanane HANNAH    [facility]

## 2021-02-05 ENCOUNTER — ANCILLARY PROCEDURE (OUTPATIENT)
Dept: CT IMAGING | Facility: CLINIC | Age: 66
End: 2021-02-05
Attending: PHYSICIAN ASSISTANT

## 2021-02-05 DIAGNOSIS — Z82.49 FAMILY HISTORY OF ASCVD: ICD-10-CM

## 2021-02-05 LAB — RADIOLOGIST FLAGS: NORMAL

## 2021-02-05 PROCEDURE — 75571 CT HRT W/O DYE W/CA TEST: CPT | Performed by: STUDENT IN AN ORGANIZED HEALTH CARE EDUCATION/TRAINING PROGRAM

## 2021-02-08 ENCOUNTER — TELEPHONE (OUTPATIENT)
Dept: FAMILY MEDICINE | Facility: CLINIC | Age: 66
End: 2021-02-08

## 2021-02-08 NOTE — TELEPHONE ENCOUNTER
RN took high priority results call from radiology.    2/5/21 CT: Radiologist flagged for 7mm solid pulmonary nodule in right lower lobe. Report is in Epic to view.    Routed to PCP to review and advise.     Martina Mcqueen RN, BSN, PHN  [facility] [place]

## 2021-02-10 NOTE — TELEPHONE ENCOUNTER
No PA required, okay to schedule      Hanane HANNAH    [facility]

## 2021-02-17 ENCOUNTER — RADIOLOGY INJECTION OFFICE VISIT (OUTPATIENT)
Dept: PALLIATIVE MEDICINE | Facility: CLINIC | Age: 66
End: 2021-02-17
Payer: COMMERCIAL

## 2021-02-17 VITALS — OXYGEN SATURATION: 96 % | SYSTOLIC BLOOD PRESSURE: 149 MMHG | HEART RATE: 85 BPM | DIASTOLIC BLOOD PRESSURE: 100 MMHG

## 2021-02-17 DIAGNOSIS — M54.16 LUMBAR RADICULOPATHY: ICD-10-CM

## 2021-02-17 PROCEDURE — 62323 NJX INTERLAMINAR LMBR/SAC: CPT | Performed by: PSYCHIATRY & NEUROLOGY

## 2021-02-17 ASSESSMENT — PAIN SCALES - GENERAL: PAINLEVEL: MILD PAIN (2)

## 2021-02-17 NOTE — NURSING NOTE
Discharge Information    IV Discontiued Time:  NA    Amount of Fluid Infused:  NA    Discharge Criteria = When patient returns to baseline or as per MD order    Consciousness:  Pt is fully awake    Circulation:  BP +/- 20% of pre-procedure level    Respiration:  Patient is able to breathe deeply    O2 Sat:  Patient is able to maintain O2 Sat >92% on room air    Activity:  Moves 4 extremities on command    Ambulation:  Patient is able to stand and walk or stand and pivot into wheelchair    Dressing:  Clean/dry or No Dressing    Notes:   Discharge instructions and AVS given to patient    Patient meets criteria for discharge?  YES    Admitted to PCU?  No    Responsible adult present to accompany patient home?  Yes    Signature/Title:    deuce warner RN  RN Care Coordinator  [facility] [place]

## 2021-02-17 NOTE — PROGRESS NOTES
Pre procedure Diagnosis: lumbar radiculopathy    Post procedure Diagnosis: Same  Procedure performed: L3-4 interlaminar epidural steroid injection   Anesthesia: none  Complications: none  Operators: Chen Daley MD     Indications:   Mina Ames is a 65 year old male was sent by GARFIELD Yanez for epidural steroid injection.  They have a history of low back pain bilateral, left anterior leg pain at times.  Exam shows able to extend knee and flex his hip without difficulty and they have tried conservative treatment including medications, PT.    MRI was done on 12/9/20 which showed   1. Multilevel degenerative disc disease and facet arthropathy of the  lumbar spine, as described.  2. Particular note is made of a disc bulge with superimposed cranially  migrating left foraminal disc extrusion at L3-L4, which in combination  with facet arthropathy results in moderate left L3-L4 neural foraminal  stenosis. There are lesser degrees of neural foraminal narrowing  elsewhere.  3. No significant central spinal canal stenosis.  4. Please see the body of the report for additional details, including  level by level findings.     Options/alternatives, benefits and risks were discussed with the patient including bleeding, infection, no pain relief, tissue trauma, exposure to radiation, reaction to medications, spinal cord injury, dural puncture, weakness, numbness and headache.  Questions were answered to his satisfaction and he agrees to proceed. Voluntary informed consent was obtained and signed.     Vitals were reviewed: Yes  Allergies were reviewed:  Yes   Medications were reviewed:  Yes   Pre-procedure pain score: 2/10    Procedure:  After getting informed consent, patient was brought into the procedure suite and was placed in a prone position on the procedure table.   A Pause for the Cause was performed.  Patient was prepped and draped in sterile fashion.     The L3/4 interspace was identified with AP fluoroscopy.  A  total of 4ml of 1% lidocaine was used to anesthetize the skin for a left paramedian approach.    A 20gauge 4.5inch Touhy needle was advanced under intermittent fluoroscopy.  A SATURNINO syringe was used to advance the needle into the epidural space.   After loss of resistance, there was no evidence of blood or CSF on aspiration. Location was verified with both AP and lateral fluoroscopic imaging.    A total of 1.5ml of Omnipaque 300 was injected demonstrating appropriate epidural spread and was checked in both the AP and lateral views. 8.5ml was wasted. There was no evidence of intravascular or intrathecal spread.    2 ml of 0.5% bupivacaine with 10mg of dexamethasone and 2ml of preservative free saline was injected.  The needle was removed from the epidural space, flushed with 1% lidocaine and removed.     Hemostasis was achieved, the area was cleaned, and bandaids were placed when appropriate.  The patient tolerated the procedure well, and was taken to the recovery room.    Images were saved to PACS.    Post-procedure pain score: 2/10  Follow-up includes:   -f/u phone call in one week  -f/u with referring provider    Chen Daley MD  [facility]

## 2021-02-17 NOTE — PATIENT INSTRUCTIONS
[facility]   Procedure Discharge Instructions    Today you saw:    Dr. Monika Daley      You had an:  Epidural steroid injection  -lumbar    Medications used:  Lidocaine   Bupivacaine   Dexamethasone Omnipaque  Normal saline          Be cautious when walking. Numbness and/or weakness in the lower extremities may occur for up to 6-8 hours after the procedure due to effect of the local anesthetic    Do not drive for 6 hours. The effect of the local anesthetic could slow your reflexes.     You may resume your regular activities after 24 hours    Avoid strenuous activity for the first 24 hours    You may shower, however avoid swimming, tub baths or hot tubs for 24 hours following your procedure    You may have a mild to moderate increase in pain for several days following the injection.    It may take up to 14 days for the steroid medication to start working although you may feel the effect as early as a few days after the procedure.       You may use ice packs for 10-15 minutes, 3 to 4 times a day at the injection site for comfort    Do not use heat to painful areas for 6 to 8 hours. This will give the local anesthetic time to wear off and prevent you from accidentally burning your skin.     Unless you have been directed to avoid the use of anti-inflammatory medications (NSAIDS), you may use medications such as ibuprofen, Aleve or Tylenol for pain control if needed.     If you were fasting, you may resume your normal diet and medications after the procedure    If you have diabetes, check your blood sugar more frequently than usual as your blood sugar may be higher than normal for 10-14 days following a steroid injection. Contact your doctor who manages your diabetes if your blood sugar is higher than usual    Possible side effects of steroids that you may experience include flushing, elevated blood pressure, increased appetite, mild headaches and restlessness.  All of these symptoms  will get better with time.    If you experience any of the following, call the Pain Clinic during work hours (Mon-Friday 8-4:30 pm) at [phone] or the Provider Line after hours at [phone]:  -Fever over 100 degree F  -Swelling, bleeding, redness, drainage, warmth at the injection site  -Progressive weakness or numbness in your legs or arms  -Loss of bowel or bladder function  -Unusual headache that is not relieved by Tylenol or other pain reliever  -Unusual new onset of pain that is not improving

## 2021-02-17 NOTE — NURSING NOTE
Pre-procedure Intake    Have you been fasting? NA    If yes, for how long? NA    Are you taking a prescribed blood thinner such as coumadin, Plavix, Xarelto?    No    If yes, when did you take your last dose? NA    Do you take aspirin?  Yes -   ASA    If cervical procedure, have you held aspirin for 6 days?   NA    Do you have any allergies to contrast dye, iodine, steroid and/or numbing medications?  NO    Are you currently taking antibiotics or have an active infection?  NO    Have you had a fever/elevated temperature within the past week? NO    Are you currently taking oral steroids? NO    Do you have a ? Yes       Are you pregnant or breastfeeding?  Not Applicable    Are the vital signs normal?  Yes      Naina Gilman CMA ([facility])

## 2021-03-24 ENCOUNTER — IMMUNIZATION (OUTPATIENT)
Dept: PEDIATRICS | Facility: CLINIC | Age: 66
End: 2021-03-24
Payer: COMMERCIAL

## 2021-03-24 PROCEDURE — 0001A PR COVID VAC PFIZER DIL RECON 30 MCG/0.3 ML IM: CPT

## 2021-03-24 PROCEDURE — 91300 PR COVID VAC PFIZER DIL RECON 30 MCG/0.3 ML IM: CPT

## 2021-04-22 ENCOUNTER — IMMUNIZATION (OUTPATIENT)
Dept: PEDIATRICS | Facility: CLINIC | Age: 66
End: 2021-04-22
Attending: INTERNAL MEDICINE
Payer: COMMERCIAL

## 2021-04-22 PROCEDURE — 0002A PR COVID VAC PFIZER DIL RECON 30 MCG/0.3 ML IM: CPT

## 2021-04-22 PROCEDURE — 91300 PR COVID VAC PFIZER DIL RECON 30 MCG/0.3 ML IM: CPT

## 2021-04-23 DIAGNOSIS — G89.29 CHRONIC LOW BACK PAIN WITHOUT SCIATICA, UNSPECIFIED BACK PAIN LATERALITY: ICD-10-CM

## 2021-04-23 DIAGNOSIS — M54.50 CHRONIC LOW BACK PAIN WITHOUT SCIATICA, UNSPECIFIED BACK PAIN LATERALITY: ICD-10-CM

## 2021-04-26 RX ORDER — CELECOXIB 200 MG/1
200 CAPSULE ORAL DAILY
Qty: 90 CAPSULE | Refills: 1 | Status: SHIPPED | OUTPATIENT
Start: 2021-04-26 | End: 2021-10-29

## 2021-04-26 NOTE — TELEPHONE ENCOUNTER
Routing refill request to provider for review/approval because:  BP Readings from Last 3 Encounters:   02/17/21 (!) 149/100   02/02/21 134/89   01/05/21 134/78     No results found for: ALT

## 2021-06-14 NOTE — PROGRESS NOTES
Subjective:  HPI  Physical Exam                    Objective:  System    Physical Exam    General     ROS    Assessment/Plan:    DISCHARGE REPORT    Progress reporting period is from 11/18/2020 to 12/2/2020..       SUBJECTIVE: Pt reports min change in his L groin/thigh sx. Notes no change with switch to flexoin with R rotation, Walked about 15-20 min around grocery store today with increased groin /hip pain into mid thigh. Sleep is disrupted by incr groin pain when lying on R side, feels better on L .       .       .   Changes in function:  None  Adverse reaction to treatment or activity: None    OBJECTIVE    Objective: Pt with moderate limping upon arrival today. LROM flexion: no loss, ext: mod loss with incr groin pain, L SG: mod loss with incr groin/thigh pain, R SG: mod loss. Pt so far responding better to repeated flexion than ext.      ASSESSMENT/PLAN  Updated problem list and treatment plan: LBP.  STG/LTGs have been met or progress has been made towards goals:    Assessment of Progress: The patient has not returned to therapy. Current status is unknown.  Self Management Plans:  Patient has been instructed in a home treatment program.  Pt did not return for follow up visits.   Mina continues to require the following intervention to meet STG and LTG's:  Pt will be discharged from PT at this time as did not schedule additional visits.     Recommendations:  The progress note/discharge summary was written in collaboration with and reviewed by the physical therapist.    Please refer to the daily flowsheet for treatment today, total treatment time and time spent performing 1:1 timed codes.

## 2021-08-27 ENCOUNTER — ANCILLARY PROCEDURE (OUTPATIENT)
Dept: CT IMAGING | Facility: CLINIC | Age: 66
End: 2021-08-27
Attending: PHYSICIAN ASSISTANT
Payer: COMMERCIAL

## 2021-08-27 DIAGNOSIS — R91.8 PULMONARY NODULES: ICD-10-CM

## 2021-08-27 PROCEDURE — 71250 CT THORAX DX C-: CPT | Mod: GC | Performed by: RADIOLOGY

## 2021-10-22 ENCOUNTER — DOCUMENTATION ONLY (OUTPATIENT)
Dept: OTHER | Facility: CLINIC | Age: 66
End: 2021-10-22

## 2021-10-23 ENCOUNTER — HEALTH MAINTENANCE LETTER (OUTPATIENT)
Age: 66
End: 2021-10-23

## 2021-10-25 ENCOUNTER — IMMUNIZATION (OUTPATIENT)
Dept: FAMILY MEDICINE | Facility: CLINIC | Age: 66
End: 2021-10-25
Payer: COMMERCIAL

## 2021-10-25 DIAGNOSIS — Z23 NEED FOR PROPHYLACTIC VACCINATION AND INOCULATION AGAINST INFLUENZA: Primary | ICD-10-CM

## 2021-10-25 PROCEDURE — 90662 IIV NO PRSV INCREASED AG IM: CPT

## 2021-10-25 PROCEDURE — 99207 PR NO CHARGE NURSE ONLY: CPT

## 2021-10-25 PROCEDURE — 90471 IMMUNIZATION ADMIN: CPT

## 2021-10-25 NOTE — PROGRESS NOTES
Patient instructed to remain in clinic for 15 minutes afterwards, and to report any adverse reaction to me immediately.    Prior to injection verified patient identity using patient's name and date of birth.  Vaccine information supplied for influenza.  Chani See JUAN [place]

## 2021-10-26 ENCOUNTER — IMMUNIZATION (OUTPATIENT)
Dept: NURSING | Facility: CLINIC | Age: 66
End: 2021-10-26
Payer: COMMERCIAL

## 2021-10-26 DIAGNOSIS — I10 HYPERTENSION GOAL BP (BLOOD PRESSURE) < 140/90: ICD-10-CM

## 2021-10-26 DIAGNOSIS — M54.50 CHRONIC LOW BACK PAIN WITHOUT SCIATICA, UNSPECIFIED BACK PAIN LATERALITY: ICD-10-CM

## 2021-10-26 DIAGNOSIS — G89.29 CHRONIC LOW BACK PAIN WITHOUT SCIATICA, UNSPECIFIED BACK PAIN LATERALITY: ICD-10-CM

## 2021-10-26 PROCEDURE — 91300 PR COVID VAC PFIZER DIL RECON 30 MCG/0.3 ML IM: CPT

## 2021-10-26 PROCEDURE — 0004A PR COVID VAC PFIZER DIL RECON 30 MCG/0.3 ML IM: CPT

## 2021-10-29 RX ORDER — CELECOXIB 200 MG/1
CAPSULE ORAL
Qty: 90 CAPSULE | Refills: 1 | Status: SHIPPED | OUTPATIENT
Start: 2021-10-29 | End: 2022-04-11

## 2021-10-29 RX ORDER — HYDROCHLOROTHIAZIDE 25 MG/1
TABLET ORAL
Qty: 90 TABLET | Refills: 1 | Status: SHIPPED | OUTPATIENT
Start: 2021-10-29 | End: 2022-04-11

## 2022-01-03 ENCOUNTER — OFFICE VISIT (OUTPATIENT)
Dept: ORTHOPEDICS | Facility: CLINIC | Age: 67
End: 2022-01-03
Payer: COMMERCIAL

## 2022-01-03 VITALS
BODY MASS INDEX: 33.33 KG/M2 | HEIGHT: 69 IN | WEIGHT: 225 LBS | DIASTOLIC BLOOD PRESSURE: 82 MMHG | SYSTOLIC BLOOD PRESSURE: 132 MMHG

## 2022-01-03 DIAGNOSIS — M47.816 LUMBAR FACET ARTHROPATHY: ICD-10-CM

## 2022-01-03 DIAGNOSIS — M54.50 RIGHT-SIDED LOW BACK PAIN WITHOUT SCIATICA, UNSPECIFIED CHRONICITY: Primary | ICD-10-CM

## 2022-01-03 PROCEDURE — 99214 OFFICE O/P EST MOD 30 MIN: CPT | Performed by: PEDIATRICS

## 2022-01-03 ASSESSMENT — MIFFLIN-ST. JEOR: SCORE: 1795.97

## 2022-01-03 NOTE — PROGRESS NOTES
ASSESSMENT & PLAN    Mina was seen today for recheck.    Diagnoses and all orders for this visit:    Right-sided low back pain without sciatica, unspecified chronicity  -     Pain Management Referral; Future  -     [facility] Referral; Future    Lumbar facet arthropathy  -     Pain Management Referral; Future  -     [facility] PT and Hand Referral; Future      Lumbar degenerative change. Did well with previous MINDY, but that was for left low back pain and radicular symptoms. This is more focal right low back pain, with facet arthrosis on MRI.  We discussed the following: symptom treatment, activity modification/rest, imaging, rehab and injection therapy. Following discussion, plan:  He agreed with return to PT, one visit to start.  Interested in injection, referred to pain mgmt; anticipate facet target based on today.  Contact clinic with update 2-3 weeks after injection, sooner prn.  Questions answered. Discussed signs and symptoms that may indicate more serious issues; the patient was instructed to seek appropriate care if noted. Mina indicates understanding of these issues and agrees with the plan.        See Patient Instructions  Patient Instructions   Previous lumbar MRI demonstrated underlying degenerative change, including degenerative disc disease, as well as facet arthropathy.  With the more focal right-sided low back pain, and exam findings today, favor this coming more from the facet joint(s) source.  Icing, heat, over-the-counter medication as needed for soreness.  Referred to physical therapy; plan 1 visit to start for refresher, additional visits per therapist recommendation.  We discussed consideration of additional imaging of the affected area with repeat lumbar MRI, but this is not required currently given assessment and plan for other intervention.  Reviewed injection options through pain management.  Favor facet joint target given the more focal right low back pain, though repeat MINDY is additional  "consideration.  Referral placed to pain management for injection, anticipate facet target.  If needed, may contact clinic for letter for work, could place lifting restrictions if needed.  Contact clinic with update 2 to 3 weeks after injection, sooner if needed.    If you have any further questions for your physician or physician s care team you can call [phone] and use option 3 to leave a voice message. Calls received during business hours will be returned same day.        Geronimo Gomes [facility] [place]    SUBJECTIVE- Interim History January 3, 2022    Chief Complaint   Patient presents with     Lower Back - RECHECK       Mina Ames is a 66 year old male who is seen in f/u up for    Acute left-sided low back pain with left-sided sciatica  Lumbar radiculopathy. Since last visit on 1/5/21 patient has begun to have pain again in his low back.  Pain began after picking up some trash while at work.  Felt some soreness in his low back.  Pain increased the following few days.  It has since leveled out and is around the same area it was previously.  Does not currently have any radicular symptoms.   Feels he did have good relief from the injection.    Has been using ice, heat and ibuprofen.     2/17/21 Procedure performed: L3-4 interlaminar epidural steroid injection  With Dr. Daley; notes reviewed.    **  Pain flared ~1 week ago. Was diffuse, low back. Now is more right low back.  Able to walk, but still feeling pain.  No radiating symptoms. No N/T.        REVIEW OF SYSTEMS:  Review of Systems      OBJECTIVE:  /82   Ht 1.753 m (5' 9\")   Wt 102.1 kg (225 lb)   BMI 33.23 kg/m       GENERAL APPEARANCE: healthy, alert and no distress   GAIT: NORMAL  SKIN: no suspicious lesions or rashes  HEENT: Sclera clear, anicteric  CV: no lower extremity edema, good peripheral pulses  RESP: Breathing not labored  NEURO: Normal strength and tone, mentation intact and speech " normal  PSYCH:  mentation appears normal and affect normal/bright      Low back exam:    Inspection:     no visible deformity in the low back       normal skin       normal vascular       normal lymphatic    ROM:     Flexion hands to ankles, minimal change in pain  Some right low back pain with extension  Right low back pain with bilateral lateral bending    Tender:     paraspinal muscles right       SI joint right  Mild low midline    Non Tender:     remainder of lumbar spine    Strength:     hip flexion 5/5       knee extension 5/5       ankle dorsiflexion 5/5       ankle plantarflexion 5/5       dorsiflexion of the great toe 5/5    Reflexes:     patellar (L3, L4) symmetric        achilles tendons (S1) symmetric   intact    Sensation:    grossly intact throughout lower extremities    Special tests:      slump test neg               RADIOLOGY:  Final results and radiologist's interpretation, available in the [facility] health record.  Images were reviewed with the patient in the office today.  My personal interpretation of the performed imaging: previous lumbar MRI showed degenerative change, DDD and facet arthrosis.      MRI LUMBAR SPINE WITHOUT CONTRAST   12/9/2020 5:49 PM      HISTORY: Ongoing left lower extremity pain, posterior hip pain,  persistent symptoms despite conservative treatment. Acute left-sided  low back pain with left-sided sciatica.     TECHNIQUE: Multiplanar multisequence MRI of the lumbar spine without  contrast.      COMPARISON: Lumbar spine radiographs dated 11/17/2020.      FINDINGS:  Five lumbar vertebral bodies are presumed. Grade 1  anterolisthesis of L4 on L5 measuring approximately 3 mm. Sagittal  alignment is otherwise normal. Normal vertebral body heights. Mild  Modic type II degenerative endplate change at T11-T12 and T12-L1, and  also at L3-L4 and L4-L5. Small presumed intraosseous hemangioma or  focal fatty marrow in the L3 vertebral body. Normal appearance of the  distal spinal cord  with the conus terminating at L2. Visualized  paraspinous soft tissues and the visualized aspects of the bony pelvis  are unremarkable.     Segmental analysis:  T12-L1: Normal disc height and signal. Shallow symmetric disc bulge.  No herniation. Normal facets. No spinal canal or neural foraminal  stenosis.     L1-L2: Normal disc height and signal. No herniation. Normal facets. No  spinal canal or neural foraminal stenosis.     L2-L3: Normal disc height and signal. There is a small disc bulge  slightly eccentric to the left. Mild facet arthropathy and ligamentum  flavum thickening. No spinal canal stenosis. No significant neural  foraminal stenosis.     L3-L4: Mild disc height loss. There is a disc bulge eccentric to the  left with what appears to be a superimposed cranially migrating left  foraminal disc extrusion (series 5 image 25, series 2 image 12). Mild  to moderate facet arthropathy and mild ligamentum flavum thickening.  No spinal canal stenosis. Moderate left neural foraminal stenosis with  the anterior superior aspect of the extrusion at least contacting the  exiting left L3 nerve root. There is mild right neural foraminal  stenosis.     L4-L5: Mild disc height loss. Small symmetric disc bulge. Moderate to  severe bilateral facet arthropathy. No spinal canal stenosis. Mild to  moderate left and mild right neural foraminal stenosis.     L5-S1: Normal disc height and signal. No herniation. Moderate/moderate  to severe bilateral facet arthropathy. No spinal canal stenosis. No  significant neural foraminal stenosis.                                                                      IMPRESSION:  1. Multilevel degenerative disc disease and facet arthropathy of the  lumbar spine, as described.  2. Particular note is made of a disc bulge with superimposed cranially  migrating left foraminal disc extrusion at L3-L4, which in combination  with facet arthropathy results in moderate left L3-L4 neural  foraminal  stenosis. There are lesser degrees of neural foraminal narrowing  elsewhere.  3. No significant central spinal canal stenosis.  4. Please see the body of the report for additional details, including  level by level findings.     MARIO ROCHE MD        Note: Time spent in one-on-one evaluation and discussion with the patient regarding the nature of problem, course, prior treatments, and therapeutic options, along with review of medical record (including outside sources/documentation), and completing documentation: 30 minutes.

## 2022-01-03 NOTE — PATIENT INSTRUCTIONS
Previous lumbar MRI demonstrated underlying degenerative change, including degenerative disc disease, as well as facet arthropathy.  With the more focal right-sided low back pain, and exam findings today, favor this coming more from the facet joint(s) source.  Icing, heat, over-the-counter medication as needed for soreness.  Referred to physical therapy; plan 1 visit to start for refresher, additional visits per therapist recommendation.  We discussed consideration of additional imaging of the affected area with repeat lumbar MRI, but this is not required currently given assessment and plan for other intervention.  Reviewed injection options through pain management.  Favor facet joint target given the more focal right low back pain, though repeat MINDY is additional consideration.  Referral placed to pain management for injection, anticipate facet target.  If needed, may contact clinic for letter for work, could place lifting restrictions if needed.  Contact clinic with update 2 to 3 weeks after injection, sooner if needed.    If you have any further questions for your physician or physician s care team you can call [phone] and use option 3 to leave a voice message. Calls received during business hours will be returned same day.

## 2022-01-03 NOTE — LETTER
1/3/2022         RE: Mina Ames  [address]  [facility] [address]        Dear Colleague,    Thank you for referring your patient, Mina Ames, to the [facility] [place]. Please see a copy of my visit note below.    ASSESSMENT & PLAN    Mina was seen today for recheck.    Diagnoses and all orders for this visit:    Right-sided low back pain without sciatica, unspecified chronicity  -     Pain Management Referral; Future  -     [facility] PT and Hand Referral; Future    Lumbar facet arthropathy  -     Pain Management Referral; Future  -     [facility] PT and Hand Referral; Future      Lumbar degenerative change. Did well with previous MINDY, but that was for left low back pain and radicular symptoms. This is more focal right low back pain, with facet arthrosis on MRI.  We discussed the following: symptom treatment, activity modification/rest, imaging, rehab and injection therapy. Following discussion, plan:  He agreed with return to PT, one visit to start.  Interested in injection, referred to pain mgmt; anticipate facet target based on today.  Contact clinic with update 2-3 weeks after injection, sooner prn.  Questions answered. Discussed signs and symptoms that may indicate more serious issues; the patient was instructed to seek appropriate care if noted. Mina indicates understanding of these issues and agrees with the plan.        See Patient Instructions  Patient Instructions   Previous lumbar MRI demonstrated underlying degenerative change, including degenerative disc disease, as well as facet arthropathy.  With the more focal right-sided low back pain, and exam findings today, favor this coming more from the facet joint(s) source.  Icing, heat, over-the-counter medication as needed for soreness.  Referred to physical therapy; plan 1 visit to start for refresher, additional visits per therapist recommendation.  We discussed consideration of additional imaging of the affected  "area with repeat lumbar MRI, but this is not required currently given assessment and plan for other intervention.  Reviewed injection options through pain management.  Favor facet joint target given the more focal right low back pain, though repeat MINDY is additional consideration.  Referral placed to pain management for injection, anticipate facet target.  If needed, may contact clinic for letter for work, could place lifting restrictions if needed.  Contact clinic with update 2 to 3 weeks after injection, sooner if needed.    If you have any further questions for your physician or physician s care team you can call [phone] and use option 3 to leave a voice message. Calls received during business hours will be returned same day.        [facility] [place]    SUBJECTIVE- Interim History January 3, 2022    Chief Complaint   Patient presents with     Lower Back - RECHECK       Mina Ames is a 66 year old male who is seen in f/ up for    Acute left-sided low back pain with left-sided sciatica  Lumbar radiculopathy. Since last visit on 1/5/21 patient has begun to have pain again in his low back.  Pain began after picking up some trash while at work.  Felt some soreness in his low back.  Pain increased the following few days.  It has since leveled out and is around the same area it was previously.  Does not currently have any radicular symptoms.   Feels he did have good relief from the injection.    Has been using ice, heat and ibuprofen.     2/17/21 Procedure performed: L3-4 interlaminar epidural steroid injection  With Dr. Daley; notes reviewed.    **  Pain flared ~1 week ago. Was diffuse, low back. Now is more right low back.  Able to walk, but still feeling pain.  No radiating symptoms. No N/T.        REVIEW OF SYSTEMS:  Review of Systems      OBJECTIVE:  /82   Ht 1.753 m (5' 9\")   Wt 102.1 kg (225 lb)   BMI 33.23 kg/m       GENERAL " APPEARANCE: healthy, alert and no distress   GAIT: NORMAL  SKIN: no suspicious lesions or rashes  HEENT: Sclera clear, anicteric  CV: no lower extremity edema, good peripheral pulses  RESP: Breathing not labored  NEURO: Normal strength and tone, mentation intact and speech normal  PSYCH:  mentation appears normal and affect normal/bright      Low back exam:    Inspection:     no visible deformity in the low back       normal skin       normal vascular       normal lymphatic    ROM:     Flexion hands to ankles, minimal change in pain  Some right low back pain with extension  Right low back pain with bilateral lateral bending    Tender:     paraspinal muscles right       SI joint right  Mild low midline    Non Tender:     remainder of lumbar spine    Strength:     hip flexion 5/5       knee extension 5/5       ankle dorsiflexion 5/5       ankle plantarflexion 5/5       dorsiflexion of the great toe 5/5    Reflexes:     patellar (L3, L4) symmetric        achilles tendons (S1) symmetric   intact    Sensation:    grossly intact throughout lower extremities    Special tests:      slump test neg               RADIOLOGY:  Final results and radiologist's interpretation, available in the [facility] health record.  Images were reviewed with the patient in the office today.  My personal interpretation of the performed imaging: previous lumbar MRI showed degenerative change, DDD and facet arthrosis.      MRI LUMBAR SPINE WITHOUT CONTRAST   12/9/2020 5:49 PM      HISTORY: Ongoing left lower extremity pain, posterior hip pain,  persistent symptoms despite conservative treatment. Acute left-sided  low back pain with left-sided sciatica.     TECHNIQUE: Multiplanar multisequence MRI of the lumbar spine without  contrast.      COMPARISON: Lumbar spine radiographs dated 11/17/2020.      FINDINGS:  Five lumbar vertebral bodies are presumed. Grade 1  anterolisthesis of L4 on L5 measuring approximately 3 mm. Sagittal  alignment is otherwise  normal. Normal vertebral body heights. Mild  Modic type II degenerative endplate change at T11-T12 and T12-L1, and  also at L3-L4 and L4-L5. Small presumed intraosseous hemangioma or  focal fatty marrow in the L3 vertebral body. Normal appearance of the  distal spinal cord with the conus terminating at L2. Visualized  paraspinous soft tissues and the visualized aspects of the bony pelvis  are unremarkable.     Segmental analysis:  T12-L1: Normal disc height and signal. Shallow symmetric disc bulge.  No herniation. Normal facets. No spinal canal or neural foraminal  stenosis.     L1-L2: Normal disc height and signal. No herniation. Normal facets. No  spinal canal or neural foraminal stenosis.     L2-L3: Normal disc height and signal. There is a small disc bulge  slightly eccentric to the left. Mild facet arthropathy and ligamentum  flavum thickening. No spinal canal stenosis. No significant neural  foraminal stenosis.     L3-L4: Mild disc height loss. There is a disc bulge eccentric to the  left with what appears to be a superimposed cranially migrating left  foraminal disc extrusion (series 5 image 25, series 2 image 12). Mild  to moderate facet arthropathy and mild ligamentum flavum thickening.  No spinal canal stenosis. Moderate left neural foraminal stenosis with  the anterior superior aspect of the extrusion at least contacting the  exiting left L3 nerve root. There is mild right neural foraminal  stenosis.     L4-L5: Mild disc height loss. Small symmetric disc bulge. Moderate to  severe bilateral facet arthropathy. No spinal canal stenosis. Mild to  moderate left and mild right neural foraminal stenosis.     L5-S1: Normal disc height and signal. No herniation. Moderate/moderate  to severe bilateral facet arthropathy. No spinal canal stenosis. No  significant neural foraminal stenosis.                                                                      IMPRESSION:  1. Multilevel degenerative disc disease and  facet arthropathy of the  lumbar spine, as described.  2. Particular note is made of a disc bulge with superimposed cranially  migrating left foraminal disc extrusion at L3-L4, which in combination  with facet arthropathy results in moderate left L3-L4 neural foraminal  stenosis. There are lesser degrees of neural foraminal narrowing  elsewhere.  3. No significant central spinal canal stenosis.  4. Please see the body of the report for additional details, including  level by level findings.     MARIO ROCHE MD        Note: Time spent in one-on-one evaluation and discussion with the patient regarding the nature of problem, course, prior treatments, and therapeutic options, along with review of medical record (including outside sources/documentation), and completing documentation: 30 minutes.        Again, thank you for allowing me to participate in the care of your patient.        Sincerely,        Geronimo Gomes, DO

## 2022-01-07 ENCOUNTER — THERAPY VISIT (OUTPATIENT)
Dept: PHYSICAL THERAPY | Facility: CLINIC | Age: 67
End: 2022-01-07
Attending: PEDIATRICS
Payer: COMMERCIAL

## 2022-01-07 DIAGNOSIS — M47.816 LUMBAR FACET ARTHROPATHY: ICD-10-CM

## 2022-01-07 DIAGNOSIS — M54.50 RIGHT-SIDED LOW BACK PAIN WITHOUT SCIATICA, UNSPECIFIED CHRONICITY: ICD-10-CM

## 2022-01-07 PROCEDURE — 97161 PT EVAL LOW COMPLEX 20 MIN: CPT | Mod: GP | Performed by: PHYSICAL THERAPIST

## 2022-01-07 PROCEDURE — 97110 THERAPEUTIC EXERCISES: CPT | Mod: GP | Performed by: PHYSICAL THERAPIST

## 2022-01-07 NOTE — PROGRESS NOTES
Physical Therapy Initial Evaluation  Subjective:  The history is provided by the patient. No  was used.   Patient Health History  Mina Ames being seen for back, last week when bending over.     Date of Onset: 1/3/22 date of order.      Pain is reported as 3/10 on pain scale.  General health as reported by patient is excellent.  Health conditions: high blood pressure, overweight.   Red flags:  None as reported by patient.  Medical allergies: none.    Other surgery history details: wrist, appendectomy.    Current medications:  High blood pressure medication.    Current occupation is .   Primary job tasks include:  Lifting/carrying, driving, prolonged sitting and prolonged standing.                  Therapist Generated HPI Evaluation  Problem details: The pt presents today with right sided low back pain that happened a week ago when bending forward. He had a hard time walking last week. His back pain has much improved since last week..         Type of problem:  Lumbar.    This is a new condition.  Condition occurred with:  Bending.  Where condition occurred: at home.  Patient reports pain:  Lower lumbar spine and lumbar spine right.  Pain is described as aching and is constant.  Pain radiates to:  No radiation. Pain timing: worse with activity.  Since onset symptoms are gradually improving.  Associated with: none. Symptoms are exacerbated by bending, lifting and carrying  and relieved by rest, heat and NSAID's.  Special tests included:  X-ray.    Restrictions due to condition include:  Working in normal job without restrictions.  Barriers include:  None as reported by patient.                        Objective:  Standing Alignment:    Cervical/Thoracic:  Thoracic kyphosis increased    Lumbar:  Lordosis decr                Flexibility/Screens:       Lower Extremity:  Decreased left lower extremity flexibility:Hamstrings    Decreased right lower extremity flexibility:   Hamstrings               Lumbar/SI Evaluation  ROM:    AROM Lumbar:   Flexion:            Finger tips to toes  Ext:                    Mild loss, pain right low back   Side Bend:        Left:  Mild loss, pain right low back    Right:  Mild loss, pain right low back  Rotation:           Left:  Mild loss, pain right low back    Right:  Moderate loss, pain right low back  Side Glide:        Left:     Right:           Lumbar Myotomes:  normal                  Neural Tension/Mobility:      Left side:SLR or SLR w/DF  negative.     Right side:   SLR w/DF or SLR  negative.   Lumbar Palpation:      Tenderness present at Right: Quadratus Lumborum; Erector Spinae and Gluteus Medius                                                     General     ROS    Assessment/Plan:    Patient is a 66 year old male with lumbar complaints.    Patient has the following significant findings with corresponding treatment plan.                Diagnosis 1:  Acute right low back pain  Pain -  hot/cold therapy, US, electric stimulation, mechanical traction, manual therapy, STS, splint/taping/bracing/orthotics, self management, education, directional preference exercise and home program  Decreased ROM/flexibility - manual therapy, therapeutic exercise, therapeutic activity and home program  Decreased strength - therapeutic exercise, therapeutic activities and home program  Impaired posture - neuro re-education, therapeutic activities and home program    Therapy Evaluation Codes:   Cumulative Therapy Evaluation is: Low complexity.    Previous and current functional limitations:  (See Goal Flow Sheet for this information)    Short term and Long term goals: (See Goal Flow Sheet for this information)     Communication ability:  Patient appears to be able to clearly communicate and understand verbal and written communication and follow directions correctly.  Treatment Explanation - The following has been discussed with the patient:   RX ordered/plan of  care  Anticipated outcomes  Possible risks and side effects  This patient would benefit from PT intervention to resume normal activities.   Rehab potential is good.    Frequency:  2 X a month, once daily  Duration:  for 3 months  Discharge Plan:  Achieve all LTG.  Independent in home treatment program.  Reach maximal therapeutic benefit.    Please refer to the daily flowsheet for treatment today, total treatment time and time spent performing 1:1 timed codes.

## 2022-01-12 ENCOUNTER — RADIOLOGY INJECTION OFFICE VISIT (OUTPATIENT)
Dept: PALLIATIVE MEDICINE | Facility: CLINIC | Age: 67
End: 2022-01-12
Attending: PEDIATRICS
Payer: COMMERCIAL

## 2022-01-12 VITALS — HEART RATE: 75 BPM | DIASTOLIC BLOOD PRESSURE: 104 MMHG | SYSTOLIC BLOOD PRESSURE: 135 MMHG | OXYGEN SATURATION: 99 %

## 2022-01-12 DIAGNOSIS — M47.816 SPONDYLOSIS OF LUMBAR REGION WITHOUT MYELOPATHY OR RADICULOPATHY: ICD-10-CM

## 2022-01-12 DIAGNOSIS — M47.896 OTHER SPONDYLOSIS, LUMBAR REGION: Primary | ICD-10-CM

## 2022-01-12 PROCEDURE — 64493 INJ PARAVERT F JNT L/S 1 LEV: CPT | Mod: 50 | Performed by: PSYCHIATRY & NEUROLOGY

## 2022-01-12 PROCEDURE — 64494 INJ PARAVERT F JNT L/S 2 LEV: CPT | Mod: 50 | Performed by: PSYCHIATRY & NEUROLOGY

## 2022-01-12 RX ORDER — DEXAMETHASONE SODIUM PHOSPHATE 10 MG/ML
10 INJECTION, SOLUTION INTRAMUSCULAR; INTRAVENOUS ONCE
Status: COMPLETED | OUTPATIENT
Start: 2022-01-12 | End: 2022-01-12

## 2022-01-12 RX ADMIN — DEXAMETHASONE SODIUM PHOSPHATE 10 MG: 10 INJECTION, SOLUTION INTRAMUSCULAR; INTRAVENOUS at 14:45

## 2022-01-12 NOTE — PROGRESS NOTES
Pre procedure Diagnosis: facet arthropathy, lumbar spondylosis   Post procedure Diagnosis: Same  Procedure performed: bilateral L4/5 and L5/S1 facet joint injections  Anesthesia: none  Complications: none  Operators: Chen Daley MD     Indications:   Mina Ames is a 66 year old male was sent by Dr. Gomes for facet joint injections.  They have a history of low back pain.  Exam shows pain with extension/rotation, tilting.  Normal range of motion and they have tried conservative treatment including medications, PT.    Options/alternatives, benefits and risks were discussed with the patient including bleeding, infection, flared pain, tissue trauma, exposure to radiation, reaction to medications including seizure, spinal cord injury, paralysis, weakness, numbness and headache.   Questions were answered to his satisfaction and he agrees to proceed. Voluntary informed consent was obtained and signed.     Vitals were reviewed: Yes  Allergies were reviewed:  Yes   Medications were reviewed:  Yes   Pre-procedure pain score:3/10  Procedure:  After getting informed consent, patient was brought into the procedure suite and was placed in a prone position on the procedure table.   A Pause for the Cause was performed.  Patient was prepped and draped in sterile fashion.     Under AP fluoroscopic guidance the L4/5 and L5/S1 facet joints on the right, then left side were identified, and the C-arm was rotated obliquely to the affected side to open the joint space. A total of 6 ml of 1% lidocaine was injected at the needle entry point and needle tract. Then a 22 gauge 3.5 inch quincke type spinal needle was inserted and advanced under fluoroscopic guidance targeting the superior articular pillar of each joint. Once the needle made a contact with SAP, it was rotated and was then advanced into the joint.    AP fluoroscopic views were obtained to confirm the needle placement. Then, contrast was injected after negative  aspiration for heme and CSF in each joint, confirming appropriate placement.  A total of 1.5ml of Omnipaque was used, and 3.5ml was wasted.     The injection was then accomplished using a solution containing 3ml of 0.5% bupivacaine mixed with 10mg of dexamethasone, divided between the 4 joints. The needles were removed.     Hemostasis was achieved, the area was cleaned, and bandaids were placed when appropriate.  The patient tolerated the procedure well, and was taken to the recovery room.    Images were saved to PACS.    Post-procedure pain score: 0/10  Follow-up includes:   -f/u with referring provider  -discussed that if helpful but not long lasting, medial branch block/RFA could be considered    Chen Daley MD  [facility]

## 2022-01-12 NOTE — NURSING NOTE
Discharge Information    IV Discontiued Time:  NA    Amount of Fluid Infused:  NA    Discharge Criteria = When patient returns to baseline or as per MD order    Consciousness:  Pt is fully awake    Circulation:  BP +/- 20% of pre-procedure level    Respiration:  Patient is able to breathe deeply    O2 Sat:  Patient is able to maintain O2 Sat >92% on room air    Activity:  Moves 4 extremities on command    Ambulation:  Patient is able to stand and walk or stand and pivot into wheelchair    Dressing:  Clean/dry or No Dressing    Notes:   Discharge instructions and AVS given to patient    Patient meets criteria for discharge?  YES    Admitted to PCU?  No    Responsible adult present to accompany patient home?  Yes    Signature/Title:    deuce warner RN  RN Care Coordinator  [facility]

## 2022-01-12 NOTE — NURSING NOTE
Pre-procedure Intake    If YES to any questions or NO to having a   Please complete laminated checklist and leave on the computer keyboard for Provider, verbally inform provider if able.    For SCS Trial, RFA's or any sedation procedure: NA    If yes, for how long?         Are you taking any any blood thinners such as Coumadin, Warfarin, Jantoven, Pradaxa Xarelto, Eliquis, Edoxaban, Enoxaparin, Lovenox, Heparin, Arixtra, Fondaparinux, or Fragmin? OR Antiplatelet medication such as Plavix, Brilinta, or Effient?  NO     If yes, when did you take your last dose?        Do you take aspirin?   Yes 81 mg    If cervical procedure, have you held aspirin for 6 days?   NA    Do you have any allergies to contrast dye, iodine, steroid and/or numbing medications?  NO     Are you currently taking antibiotics or have an active infection?  NO     Have you had a fever/elevated temperature within the past week? NO     Are you currently taking oral steroids? NO     Do you have a ? Yes     Are you pregnant or breastfeeding? NA     Have you received the COVID-19 vaccine? Yes     If yes, was it your 1st, 2nd or only dose needed? Booster dose 10/26/21    Notify provider and RNs if systolic BP >170, diastolic BP >100, P >100 or O2 sats < 90%

## 2022-01-12 NOTE — PATIENT INSTRUCTIONS
[facility]   Procedure Discharge Instructions    Today you saw:    Dr. Monika Daley      You had an:    facet joint injection       Medications used:  Lidocaine   Bupivacaine   Dexamethasone Omnipaque            Be cautious when walking. Numbness and/or weakness in the lower extremities may occur for up to 6-8 hours after the procedure due to effect of the local anesthetic    Do not drive for 6 hours. The effect of the local anesthetic could slow your reflexes.     You may resume your regular activities after 24 hours    Avoid strenuous activity for the first 24 hours    You may shower, however avoid swimming, tub baths or hot tubs for 24 hours following your procedure    You may have a mild to moderate increase in pain for several days following the injection.    It may take up to 14 days for the steroid medication to start working although you may feel the effect as early as a few days after the procedure.       You may use ice packs for 10-15 minutes, 3 to 4 times a day at the injection site for comfort    Do not use heat to painful areas for 6 to 8 hours. This will give the local anesthetic time to wear off and prevent you from accidentally burning your skin.     Unless you have been directed to avoid the use of anti-inflammatory medications (NSAIDS), you may use medications such as ibuprofen, Aleve or Tylenol for pain control if needed.     If you were fasting, you may resume your normal diet and medications after the procedure    If you have diabetes, check your blood sugar more frequently than usual as your blood sugar may be higher than normal for 10-14 days following a steroid injection. Contact your doctor who manages your diabetes if your blood sugar is higher than usual    Possible side effects of steroids that you may experience include flushing, elevated blood pressure, increased appetite, mild headaches and restlessness.  All of these symptoms will get better with  time.    If you experience any of the following, call the Pain Clinic during work hours (Mon-Friday 8-4:30 pm) at [phone] or the Provider Line after hours at [phone]:  -Fever over 100 degree F  -Swelling, bleeding, redness, drainage, warmth at the injection site  -Progressive weakness or numbness in your legs or arms  -Loss of bowel or bladder function  -Unusual headache that is not relieved by Tylenol or other pain reliever  -Unusual new onset of pain that is not improving

## 2022-02-01 ENCOUNTER — LAB (OUTPATIENT)
Dept: LAB | Facility: CLINIC | Age: 67
End: 2022-02-01
Attending: FAMILY MEDICINE

## 2022-02-01 DIAGNOSIS — Z20.822 CLOSE EXPOSURE TO 2019 NOVEL CORONAVIRUS: ICD-10-CM

## 2022-02-01 PROCEDURE — U0005 INFEC AGEN DETEC AMPLI PROBE: HCPCS | Performed by: FAMILY MEDICINE

## 2022-02-02 LAB — SARS-COV-2 RNA RESP QL NAA+PROBE: NOT DETECTED

## 2022-02-21 DIAGNOSIS — E78.5 HYPERLIPIDEMIA LDL GOAL <160: ICD-10-CM

## 2022-02-21 NOTE — TELEPHONE ENCOUNTER
**Previous Vince Curry pt, seeing you for next visit 3/31/22    Routing refill request to provider for review/approval because:  Labs out of range:    LDL Cholesterol Calculated   Date Value Ref Range Status   02/02/2021 86 <100 mg/dL Final     Comment:     Desirable:       <100 mg/dl     Jenn Dixon RN

## 2022-02-22 RX ORDER — ATORVASTATIN CALCIUM 40 MG/1
40 TABLET, FILM COATED ORAL DAILY
Qty: 90 TABLET | Refills: 3 | Status: SHIPPED | OUTPATIENT
Start: 2022-02-22 | End: 2022-07-22

## 2022-03-10 NOTE — PROGRESS NOTES
Subjective:    Patient seen as a new patient consult from [facility],  and is seen today  for left ankle sprain.  Had inversion sprain 1 days ago.  Had pain and edema, but this is slowly getting better.  Seen in clinic, xrays taken, and wearing ankle brace he had from a broken foot on contralateral side last year.  Works as a hotel  and .  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  He says it is somewhat difficult for him to put weight on this.      ROS:  A 10-point review of systems was performed and is positive for that noted in the HPI and as seen above.  All other areas are negative.          Allergies   Allergen Reactions     Nkda [No Known Drug Allergies]        Current Outpatient Prescriptions   Medication Sig Dispense Refill     aspirin 81 MG tablet Take 81 mg by mouth daily       atorvastatin (LIPITOR) 20 MG tablet Take 1 tablet (20 mg) by mouth daily 90 tablet 3     Multiple Vitamin (MULTI VITAMIN  MENS) TABS        omeprazole 20 MG tablet Take 20 mg by mouth daily         Patient Active Problem List   Diagnosis     Hyperlipidemia     Adult BMI 30+     DJD (degenerative joint disease) of knee     HYPERLIPIDEMIA LDL GOAL <160       Past Medical History:   Diagnosis Date     Adult BMI 30+      DJD (degenerative joint disease) of knee     xray Oct., 2009     Hyperlipidemia      Squamous cell skin cancer 2007    chest       Past Surgical History:   Procedure Laterality Date     C APPENDECTOMY  1971     COLONOSCOPY  2016     SURGICAL HISTORY OF -   1995    wrist       Family History   Problem Relation Age of Onset     Hypertension Mother      Heart Disease Father      Hypertension Father      Lipids Father      Depression Father      Coronary Artery Disease Father      Bypass surgery - 3        Social History   Substance Use Topics     Smoking status: Never Smoker     Smokeless tobacco: Never Used     Alcohol use No          Exam:    Vitals: Pulse 86  Wt 210 lb (95.3 kg)  BMI 30.57 kg/m2  BMI: Body mass index is 30.57 kg/(m^2).  Height: Data Unavailable    Constitutional/ general:  Pt is in no apparent distress, appears well-nourished.  Cooperative with history and physical exam.  Patient seen with his wife    Psych:  The patient answered questions appropriately.  Normal affect.  Seems to have reasonable expectations, in terms of treatment.     Eyes:  Visual scanning/ tracking without deficit.     Ears:  Response to auditory stimuli is normal.  No hearing aid devices.  Auricles in proper alignment.     Lymphatic:  Popliteal lymph nodes not enlarged.     Lungs:  Non labored breathing, non labored speech. No cough.  No audible wheezing. Even, quiet breathing.       Vascular:  positive pedal pulses bilaterally for both the DP and PT arteries.  CFT < 3 sec.  negative ankle edema.  positive pedal hair growth.    Neuro:  Alert and oriented x 3. Coordinated gait.  Light touch sensation is intact to the L4, L5, S1 distributions. No obvious deficits.  No evidence of neurological-based weakness, spasticity, or contracture in the lower extremities.      Derm: Normal texture and turgor.  No erythema, ecchymosis, or cyanosis.      Musculoskeletal:    Lower extremity muscle strength is normal.   No gross deformities.   Normal arch with weight bearing.  Muscle strength 5/5 in all compartments.  No pain with stressing any tendons.  Normal ROM all forefoot and rearfoot joints.  Anterior drawer equal and symmetrical bilateral.  Inversion equal and symmetrical bilateral.         negative ecchymosis  negative erythema  negative pain at TMTJs or styloid process.  negative Achilles or calcaneal tubercle pain  negative medial ankle pain  negative pain AITF ligament  positive pain over ATFL/CFL, slight edema here  negative pain with stressing or palpation peroneal tendons  negative peroneal subluxation  negative pain over medial or lateral  malleoli  Pain dorsal central fourth metatarsal and fourth interspace.  With loading met heads no pain.  Slight edema here.    Radiographic Exam:  X-Ray Findings: Left foot x-rays unremarkable.  Ankle x-rays from [place] show no signs of bone fracture.    Assessment:  Left  ankle sprain       Plan:  X-rays personally reviewed from [place] ED.  X-rays taken of left foot today.  Discussed with patient there is no signs of any bone break or tendon pathology.  Discussed mechanism of injuries.  He has an ankle high Cam walker.  He will continue with this and I discussed that he can walk in this as tolerated.  Discussed RICE.  He believes he has a ankle brace.  When feeling better he will graduate into this.  We will have him return to clinic in 2 weeks for reevaluation.  25 minutes spent in total time taking care of this patient with half of this time face-to-face conversation.      Heri Cervantes DPM, FACFAS       Consent 3/Introductory Paragraph: I gave the patient a chance to ask questions they had about the procedure.  Following this I explained the Mohs procedure and consent was obtained. The risks, benefits and alternatives to therapy were discussed in detail. Specifically, the risks of infection, scarring, bleeding, prolonged wound healing, incomplete removal, allergy to anesthesia, nerve injury and recurrence were addressed. Prior to the procedure, the treatment site was clearly identified and confirmed by the patient. All components of Universal Protocol/PAUSE Rule completed.

## 2022-03-11 PROBLEM — M54.50 ACUTE RIGHT-SIDED LOW BACK PAIN WITHOUT SCIATICA: Status: RESOLVED | Noted: 2020-11-19 | Resolved: 2022-03-11

## 2022-04-09 ENCOUNTER — HEALTH MAINTENANCE LETTER (OUTPATIENT)
Age: 67
End: 2022-04-09

## 2022-04-11 DIAGNOSIS — G89.29 CHRONIC LOW BACK PAIN WITHOUT SCIATICA, UNSPECIFIED BACK PAIN LATERALITY: ICD-10-CM

## 2022-04-11 DIAGNOSIS — I10 HYPERTENSION GOAL BP (BLOOD PRESSURE) < 140/90: ICD-10-CM

## 2022-04-11 DIAGNOSIS — M54.50 CHRONIC LOW BACK PAIN WITHOUT SCIATICA, UNSPECIFIED BACK PAIN LATERALITY: ICD-10-CM

## 2022-04-11 NOTE — TELEPHONE ENCOUNTER
Routing refill request to provider for review/approval because:  Labs out of range:  creatinine, potassium, sodium, ALT, AST, and CBC.  Age  BP  Was previously prescribed by BELKYS Yanez PA-C   Patient is scheduled to see you on 6/3    Creatinine   Date Value Ref Range Status   02/02/2021 1.25 0.66 - 1.25 mg/dL Final      Potassium   Date Value Ref Range Status   02/02/2021 4.7 3.4 - 5.3 mmol/L Final      Sodium   Date Value Ref Range Status   02/02/2021 139 133 - 144 mmol/L Final      No results found for: WBC  No results found for: RBC  No results found for: HGB  No results found for: HCT  No components found for: MCT  No results found for: MCV  No results found for: MCH  No results found for: MCHC  No results found for: RDW  No results found for: PLT   No results found for: ALT   No results found for: AST   BP Readings from Last 3 Encounters:   01/12/22 (!) 135/104   01/03/22 132/82   02/17/21 (!) 149/100              Pending Prescriptions:                       Disp   Refills    hydrochlorothiazide (HYDRODIURIL) 25 MG ta*90 tab*1        Sig: Take 1 tablet (25 mg) by mouth in the morning.    celecoxib (CELEBREX) 200 MG capsule        90 cap*1        Sig: Take 1 capsule (200 mg) by mouth in the morning.        Sriram Wood RN

## 2022-04-12 RX ORDER — CELECOXIB 200 MG/1
200 CAPSULE ORAL DAILY
Qty: 90 CAPSULE | Refills: 0 | Status: SHIPPED | OUTPATIENT
Start: 2022-04-12 | End: 2022-07-22

## 2022-04-12 RX ORDER — HYDROCHLOROTHIAZIDE 25 MG/1
25 TABLET ORAL DAILY
Qty: 90 TABLET | Refills: 0 | Status: SHIPPED | OUTPATIENT
Start: 2022-04-12 | End: 2022-07-22

## 2022-05-26 ENCOUNTER — IMMUNIZATION (OUTPATIENT)
Dept: NURSING | Facility: CLINIC | Age: 67
End: 2022-05-26
Payer: COMMERCIAL

## 2022-05-26 PROCEDURE — 91305 COVID-19,PF,PFIZER (12+ YRS): CPT

## 2022-05-26 PROCEDURE — 0054A COVID-19,PF,PFIZER (12+ YRS): CPT

## 2022-06-17 ENCOUNTER — OFFICE VISIT (OUTPATIENT)
Dept: ORTHOPEDICS | Facility: CLINIC | Age: 67
End: 2022-06-17
Payer: COMMERCIAL

## 2022-06-17 VITALS
SYSTOLIC BLOOD PRESSURE: 132 MMHG | DIASTOLIC BLOOD PRESSURE: 70 MMHG | HEIGHT: 69 IN | WEIGHT: 225 LBS | BODY MASS INDEX: 33.33 KG/M2

## 2022-06-17 DIAGNOSIS — M54.50 RIGHT-SIDED LOW BACK PAIN WITHOUT SCIATICA, UNSPECIFIED CHRONICITY: Primary | ICD-10-CM

## 2022-06-17 DIAGNOSIS — M47.816 LUMBAR FACET ARTHROPATHY: ICD-10-CM

## 2022-06-17 PROCEDURE — 99213 OFFICE O/P EST LOW 20 MIN: CPT | Performed by: PEDIATRICS

## 2022-06-17 NOTE — PATIENT INSTRUCTIONS
Good to hear that you had good results from the previous facet joint steroid injection.  With this increased pain in the low back, it appears to be again pain coming from facet joint degenerative change.  Considerations include rehab, injection, potential for updating imaging, and referral on to spine.  Do not think any surgery is indicated currently, therefore no referral is required, and we do not need additional imaging currently.  Regarding rehab, keep up with home exercises from previous physical therapy.  Contact clinic if interested in return to PT.  Regarding injection options, we discussed potential for repeat steroid injection, versus medial branch block to radiofrequency ablation process.  With some recent improvement in the pain again, it is okay to continue with monitoring for now, and we will hold off with any additional injections.  If the pain flares up again in the next few weeks or few months, you may contact the clinic, and we can place a referral to pain management.  Favor going the MBB to RFA route based on today's discussion.  Otherwise, we will leave follow-up open-ended.  Contact clinic if any questions or concerns.    If you have any further questions for your physician or physician s care team you can call [phone] and use option 3 to leave a voice message. Calls received during business hours will be returned same day.

## 2022-06-17 NOTE — LETTER
6/17/2022         RE: Mina Ames  [address]  [facility] [address]        Dear Colleague,    Thank you for referring your patient, Mina Ames, to the [facility]. Please see a copy of my visit note below.    ASSESSMENT & PLAN    Mina was seen today for recheck.    Diagnoses and all orders for this visit:    Right-sided low back pain without sciatica, unspecified chronicity    Lumbar facet arthropathy          See Patient Instructions  Patient Instructions   Good to hear that you had good results from the previous facet joint steroid injection.  With this increased pain in the low back, it appears to be again pain coming from facet joint degenerative change.  Considerations include rehab, injection, potential for updating imaging, and referral on to spine.  Do not think any surgery is indicated currently, therefore no referral is required, and we do not need additional imaging currently.  Regarding rehab, keep up with home exercises from previous physical therapy.  Contact clinic if interested in return to PT.  Regarding injection options, we discussed potential for repeat steroid injection, versus medial branch block to radiofrequency ablation process.  With some recent improvement in the pain again, it is okay to continue with monitoring for now, and we will hold off with any additional injections.  If the pain flares up again in the next few weeks or few months, you may contact the clinic, and we can place a referral to pain management.  Favor going the MBB to RFA route based on today's discussion.  Otherwise, we will leave follow-up open-ended.  Contact clinic if any questions or concerns.    If you have any further questions for your physician or physician s care team you can call [phone] and use option 3 to leave a voice message. Calls received during business hours will be returned same day.        Geronimo Gomes,   [facility]    SUBJECTIVE- Interim History June 17, 2022    Chief Complaint   Patient presents with     Lower Back - RECHECK       Mina Ames is a 66 year old male who is seen in f/u up for    Right-sided low back pain without sciatica, unspecified chronicity  Lumbar facet arthropathy. Since last visit on 1/3/22 patient has undergone a  bilateral L4/5 and L5/S1 facet joint injections with Dr. Daley on 1/12/22.  Does feel he had good relief from the injection.   Did have a flare of pain a few weeks ago.  Tried to run, and this increased his pain.  Does feel that the pain has been improving.  Has tried to limit his flexion and heavy lifting.  Denies any pain down his back.    Pain stays in the right side of his low back.    Was using ibuprofen and acetaminophen when his pain did flare, but has not had to use much since the injection.    Pain is still better than it was prior to the injection.         REVIEW OF SYSTEMS:  Review of Systems      OBJECTIVE:  /70   Ht 1.753 m (5' 9\")   Wt 102.1 kg (225 lb)   BMI 33.23 kg/m       GENERAL APPEARANCE: healthy, alert and no distress     Low back:  Flexion with hands to ankles, no change in pain  Extension mild right low back pain  Right lateral bending mild right low back pain  Extension and right lateral bending with more right low back pain        RADIOLOGY:  Previous MRI:    MRI LUMBAR SPINE WITHOUT CONTRAST   12/9/2020 5:49 PM      HISTORY: Ongoing left lower extremity pain, posterior hip pain,  persistent symptoms despite conservative treatment. Acute left-sided  low back pain with left-sided sciatica.     TECHNIQUE: Multiplanar multisequence MRI of the lumbar spine without  contrast.      COMPARISON: Lumbar spine radiographs dated 11/17/2020.      FINDINGS:  Five lumbar vertebral bodies are presumed. Grade 1  anterolisthesis of L4 on L5 measuring approximately 3 mm. Sagittal  alignment is otherwise normal. Normal vertebral body heights. " Mild  Modic type II degenerative endplate change at T11-T12 and T12-L1, and  also at L3-L4 and L4-L5. Small presumed intraosseous hemangioma or  focal fatty marrow in the L3 vertebral body. Normal appearance of the  distal spinal cord with the conus terminating at L2. Visualized  paraspinous soft tissues and the visualized aspects of the bony pelvis  are unremarkable.     Segmental analysis:  T12-L1: Normal disc height and signal. Shallow symmetric disc bulge.  No herniation. Normal facets. No spinal canal or neural foraminal  stenosis.     L1-L2: Normal disc height and signal. No herniation. Normal facets. No  spinal canal or neural foraminal stenosis.     L2-L3: Normal disc height and signal. There is a small disc bulge  slightly eccentric to the left. Mild facet arthropathy and ligamentum  flavum thickening. No spinal canal stenosis. No significant neural  foraminal stenosis.     L3-L4: Mild disc height loss. There is a disc bulge eccentric to the  left with what appears to be a superimposed cranially migrating left  foraminal disc extrusion (series 5 image 25, series 2 image 12). Mild  to moderate facet arthropathy and mild ligamentum flavum thickening.  No spinal canal stenosis. Moderate left neural foraminal stenosis with  the anterior superior aspect of the extrusion at least contacting the  exiting left L3 nerve root. There is mild right neural foraminal  stenosis.     L4-L5: Mild disc height loss. Small symmetric disc bulge. Moderate to  severe bilateral facet arthropathy. No spinal canal stenosis. Mild to  moderate left and mild right neural foraminal stenosis.     L5-S1: Normal disc height and signal. No herniation. Moderate/moderate  to severe bilateral facet arthropathy. No spinal canal stenosis. No  significant neural foraminal stenosis.                                                                      IMPRESSION:  1. Multilevel degenerative disc disease and facet arthropathy of the  lumbar spine,  as described.  2. Particular note is made of a disc bulge with superimposed cranially  migrating left foraminal disc extrusion at L3-L4, which in combination  with facet arthropathy results in moderate left L3-L4 neural foraminal  stenosis. There are lesser degrees of neural foraminal narrowing  elsewhere.  3. No significant central spinal canal stenosis.  4. Please see the body of the report for additional details, including  level by level findings.     MARIO ROCHE MD      Note: Time spent in one-on-one evaluation and discussion with the patient regarding the nature of problem, course, prior treatments, and therapeutic options, along with review of medical record (including outside sources/documentation), and completing documentation: 20 minutes.        Again, thank you for allowing me to participate in the care of your patient.        Sincerely,        Geronimo Gomes, DO

## 2022-06-17 NOTE — PROGRESS NOTES
ASSESSMENT & PLAN    Mina was seen today for recheck.    Diagnoses and all orders for this visit:    Right-sided low back pain without sciatica, unspecified chronicity    Lumbar facet arthropathy          See Patient Instructions  Patient Instructions   Good to hear that you had good results from the previous facet joint steroid injection.  With this increased pain in the low back, it appears to be again pain coming from facet joint degenerative change.  Considerations include rehab, injection, potential for updating imaging, and referral on to spine.  Do not think any surgery is indicated currently, therefore no referral is required, and we do not need additional imaging currently.  Regarding rehab, keep up with home exercises from previous physical therapy.  Contact clinic if interested in return to PT.  Regarding injection options, we discussed potential for repeat steroid injection, versus medial branch block to radiofrequency ablation process.  With some recent improvement in the pain again, it is okay to continue with monitoring for now, and we will hold off with any additional injections.  If the pain flares up again in the next few weeks or few months, you may contact the clinic, and we can place a referral to pain management.  Favor going the MBB to RFA route based on today's discussion.  Otherwise, we will leave follow-up open-ended.  Contact clinic if any questions or concerns.    If you have any further questions for your physician or physician s care team you can call [phone] and use option 3 to leave a voice message. Calls received during business hours will be returned same day.        [facility] [place]    SUBJECTIVE- Interim History June 17, 2022    Chief Complaint   Patient presents with     Lower Back - RECHECK       Mina CLAYTON mAes is a 66 year old male who is seen in f/u up for    Right-sided low back pain without sciatica, unspecified  "chronicity  Lumbar facet arthropathy. Since last visit on 1/3/22 patient has undergone a  bilateral L4/5 and L5/S1 facet joint injections with Dr. Daley on 1/12/22.  Does feel he had good relief from the injection.   Did have a flare of pain a few weeks ago.  Tried to run, and this increased his pain.  Does feel that the pain has been improving.  Has tried to limit his flexion and heavy lifting.  Denies any pain down his back.    Pain stays in the right side of his low back.    Was using ibuprofen and acetaminophen when his pain did flare, but has not had to use much since the injection.    Pain is still better than it was prior to the injection.         REVIEW OF SYSTEMS:  Review of Systems      OBJECTIVE:  /70   Ht 1.753 m (5' 9\")   Wt 102.1 kg (225 lb)   BMI 33.23 kg/m       GENERAL APPEARANCE: healthy, alert and no distress     Low back:  Flexion with hands to ankles, no change in pain  Extension mild right low back pain  Right lateral bending mild right low back pain  Extension and right lateral bending with more right low back pain        RADIOLOGY:  Previous MRI:    MRI LUMBAR SPINE WITHOUT CONTRAST   12/9/2020 5:49 PM      HISTORY: Ongoing left lower extremity pain, posterior hip pain,  persistent symptoms despite conservative treatment. Acute left-sided  low back pain with left-sided sciatica.     TECHNIQUE: Multiplanar multisequence MRI of the lumbar spine without  contrast.      COMPARISON: Lumbar spine radiographs dated 11/17/2020.      FINDINGS:  Five lumbar vertebral bodies are presumed. Grade 1  anterolisthesis of L4 on L5 measuring approximately 3 mm. Sagittal  alignment is otherwise normal. Normal vertebral body heights. Mild  Modic type II degenerative endplate change at T11-T12 and T12-L1, and  also at L3-L4 and L4-L5. Small presumed intraosseous hemangioma or  focal fatty marrow in the L3 vertebral body. Normal appearance of the  distal spinal cord with the conus terminating at L2. " Visualized  paraspinous soft tissues and the visualized aspects of the bony pelvis  are unremarkable.     Segmental analysis:  T12-L1: Normal disc height and signal. Shallow symmetric disc bulge.  No herniation. Normal facets. No spinal canal or neural foraminal  stenosis.     L1-L2: Normal disc height and signal. No herniation. Normal facets. No  spinal canal or neural foraminal stenosis.     L2-L3: Normal disc height and signal. There is a small disc bulge  slightly eccentric to the left. Mild facet arthropathy and ligamentum  flavum thickening. No spinal canal stenosis. No significant neural  foraminal stenosis.     L3-L4: Mild disc height loss. There is a disc bulge eccentric to the  left with what appears to be a superimposed cranially migrating left  foraminal disc extrusion (series 5 image 25, series 2 image 12). Mild  to moderate facet arthropathy and mild ligamentum flavum thickening.  No spinal canal stenosis. Moderate left neural foraminal stenosis with  the anterior superior aspect of the extrusion at least contacting the  exiting left L3 nerve root. There is mild right neural foraminal  stenosis.     L4-L5: Mild disc height loss. Small symmetric disc bulge. Moderate to  severe bilateral facet arthropathy. No spinal canal stenosis. Mild to  moderate left and mild right neural foraminal stenosis.     L5-S1: Normal disc height and signal. No herniation. Moderate/moderate  to severe bilateral facet arthropathy. No spinal canal stenosis. No  significant neural foraminal stenosis.                                                                      IMPRESSION:  1. Multilevel degenerative disc disease and facet arthropathy of the  lumbar spine, as described.  2. Particular note is made of a disc bulge with superimposed cranially  migrating left foraminal disc extrusion at L3-L4, which in combination  with facet arthropathy results in moderate left L3-L4 neural foraminal  stenosis. There are lesser degrees of  neural foraminal narrowing  elsewhere.  3. No significant central spinal canal stenosis.  4. Please see the body of the report for additional details, including  level by level findings.     MARIO ROCHE MD      Note: Time spent in one-on-one evaluation and discussion with the patient regarding the nature of problem, course, prior treatments, and therapeutic options, along with review of medical record (including outside sources/documentation), and completing documentation: 20 minutes.

## 2022-06-20 ENCOUNTER — TELEPHONE (OUTPATIENT)
Dept: ORTHOPEDICS | Facility: CLINIC | Age: 67
End: 2022-06-20
Payer: MEDICARE

## 2022-06-20 DIAGNOSIS — M54.50 RIGHT-SIDED LOW BACK PAIN WITHOUT SCIATICA, UNSPECIFIED CHRONICITY: Primary | ICD-10-CM

## 2022-06-20 DIAGNOSIS — M47.816 LUMBAR FACET ARTHROPATHY: ICD-10-CM

## 2022-06-20 NOTE — TELEPHONE ENCOUNTER
Called and spoke with patient.  Did have another flare of pain.  Would like to try another injection.     Pain management order placed.     He will contact the clinic 2-3 weeks post injection with an update    Karina Lainez MS ATC

## 2022-06-20 NOTE — TELEPHONE ENCOUNTER
Patient is requesting another order for lumbar injection with pain management. Would like call to discuss.     [phone]    Jess

## 2022-06-27 ENCOUNTER — TELEPHONE (OUTPATIENT)
Dept: ORTHOPEDICS | Facility: CLINIC | Age: 67
End: 2022-06-27

## 2022-06-27 NOTE — LETTER
[facility] [place]  [address]  [place] [address]  Phone: [phone]  Fax: [phone]      June 28, 2022        RE: Mina Ames  [address] [facility] [address]        To whom it may concern:    Mina Ames is currently under my care.  The employee is UNABLE to return to work at this time through 7/29/22 due to a medical condition and additional treatment.         Sincerely,                Geronimo MARTINEZ/wesley

## 2022-06-27 NOTE — TELEPHONE ENCOUNTER
Last episode with this he was off work. Ok for letter for off work until procedure, and would actually add 1 week after procedure. If desiring to try to work with restrictions, I will write that; let me know if that is the case.  Thanks.  Geronimo Gomes, , CAQ

## 2022-06-27 NOTE — TELEPHONE ENCOUNTER
Patient called wanting a work restriction note until his injection with Dr. Eastman for a few weeks. He notes that he is currently unable to put on his shoes and socks due to a recent flare in symptoms. Has been referred for procedure with Dr. Eastman on 7/15/2022. Please advise. Melissa Daley ATC

## 2022-06-28 ENCOUNTER — MYC MEDICAL ADVICE (OUTPATIENT)
Dept: ORTHOPEDICS | Facility: CLINIC | Age: 67
End: 2022-06-28

## 2022-07-05 ENCOUNTER — TELEPHONE (OUTPATIENT)
Dept: ORTHOPEDICS | Facility: CLINIC | Age: 67
End: 2022-07-05

## 2022-07-05 NOTE — TELEPHONE ENCOUNTER
Bing from  insurance team called and LVM.  She would like to verify our fax number and confirm that we received paperwork from their office.  She is requesting a call back at [phone] referencing # [id number].    Jesusita Zapata ATC

## 2022-07-05 NOTE — TELEPHONE ENCOUNTER
Called [employer] and reported that paperwork came across very blurry. They will be refaxed to [phone]. No further questions. Melissa Daley, ATC

## 2022-07-06 NOTE — TELEPHONE ENCOUNTER
Forms awaiting provider signature, placed in provider bin for signature.     Karina Lainez MS ATC

## 2022-07-15 ENCOUNTER — RADIOLOGY INJECTION OFFICE VISIT (OUTPATIENT)
Dept: PALLIATIVE MEDICINE | Facility: CLINIC | Age: 67
End: 2022-07-15
Attending: PEDIATRICS
Payer: COMMERCIAL

## 2022-07-15 VITALS — HEART RATE: 73 BPM | SYSTOLIC BLOOD PRESSURE: 128 MMHG | DIASTOLIC BLOOD PRESSURE: 86 MMHG

## 2022-07-15 DIAGNOSIS — M54.50 RIGHT-SIDED LOW BACK PAIN WITHOUT SCIATICA, UNSPECIFIED CHRONICITY: ICD-10-CM

## 2022-07-15 DIAGNOSIS — M47.816 LUMBAR FACET ARTHROPATHY: ICD-10-CM

## 2022-07-15 DIAGNOSIS — M47.816 SPONDYLOSIS WITHOUT MYELOPATHY OR RADICULOPATHY, LUMBAR REGION: ICD-10-CM

## 2022-07-15 PROCEDURE — 64493 INJ PARAVERT F JNT L/S 1 LEV: CPT | Mod: RT | Performed by: PAIN MEDICINE

## 2022-07-15 PROCEDURE — 64494 INJ PARAVERT F JNT L/S 2 LEV: CPT | Mod: RT | Performed by: PAIN MEDICINE

## 2022-07-15 ASSESSMENT — ENCOUNTER SYMPTOMS
NERVOUS/ANXIOUS: 0
CHILLS: 0
NAUSEA: 0
SORE THROAT: 0
ARTHRALGIAS: 0
DYSURIA: 0
CONSTIPATION: 0
WEAKNESS: 0
JOINT SWELLING: 0
COUGH: 0
DIARRHEA: 0
DIZZINESS: 0
FREQUENCY: 0
HEADACHES: 0
HEMATURIA: 0
SHORTNESS OF BREATH: 0
FEVER: 0
PALPITATIONS: 0
ABDOMINAL PAIN: 0
MYALGIAS: 1
HEMATOCHEZIA: 0
PARESTHESIAS: 0
EYE PAIN: 0
HEARTBURN: 0

## 2022-07-15 ASSESSMENT — PAIN SCALES - GENERAL
PAINLEVEL: NO PAIN (1)
PAINLEVEL: MODERATE PAIN (4)

## 2022-07-15 ASSESSMENT — ACTIVITIES OF DAILY LIVING (ADL): CURRENT_FUNCTION: NO ASSISTANCE NEEDED

## 2022-07-15 NOTE — PATIENT INSTRUCTIONS
[facility]   Medial Branch Block Discharge Instructions  RLMBB#1    Your procedure was performed by: Dr. Luiz Eastman       Medications used include:  Lidocaine  Bupivicaine      You will need to complete the Pain Scale Log form and return it to us as soon as possible.  Once we have received the form, we will review it and call you to determine the next steps.     The form can be faxed to [phone] or mailed to:   [facility]   [address]   [place] [address]    You may resume your regular medications  If you were holding your blood thinning medication, please restart taking it: N/A  You may resume your regular activities  Be cautious with walking as numbness and/or weakness in the lower extremities may occur for up to 6-8 hours due to the effects of the anesthetic.  Avoid driving for 6 hours. The local anesthetic could slow your reflexes.   You may shower, however no swimming or tub baths or hot tubs for 24 hours following your procedure.  Your pain will return after the numbing medications have worn off.  You may use your current pain medications as needed.  Unless you have been directed to avoid the use of anti-inflammatory medications (NSAIDS), you may use medications such as ibuprofen, Aleve or Tylenol for pain control if needed.  Some people find it helpful to alternate ibuprofen and Tylenol every 3 hours for a couple of days.  You may use ice packs 10-15 minutes three to four times a day at the injection site for comfort.   Do not use heat to painful areas for 6 to 8 hours. This will give the local anesthetic time to wear off and prevent you from accidentally burning your skin.   If you experience any of the following, call the Pain Clinic during work hours (Monday through Friday 8 am-4:30 pm) at [phone] or the Provider Line after hours at [phone]:  -Fever over 100 degree F  -Swelling, bleeding, redness, drainage, warmth at the  injection site  -Progressive weakness or numbness in your legs   -If lumbar, call if you have a loss of bowel or bladder function  -If cervical, call if you have any unusual headache that is not relieved by Tylenol  -Unusual new onset of pain that is not improving

## 2022-07-15 NOTE — NURSING NOTE
Discharge Information    IV Discontiued Time:  NA    Amount of Fluid Infused:  NA    Discharge Criteria = When patient returns to baseline or as per MD order    Consciousness:  Pt is fully awake    Circulation:  BP +/- 20% of pre-procedure level    Respiration:  Patient is able to breathe deeply    O2 Sat:  Patient is able to maintain O2 Sat >92% on room air    Activity:  Moves 4 extremities on command    Ambulation:  Patient is able to stand and walk or stand and pivot into wheelchair    Dressing:  Clean/dry or No Dressing    Notes:   Discharge instructions and AVS given to patient    Patient meets criteria for discharge?  YES    Admitted to PCU?  No    Responsible adult present to accompany patient home?  Yes    Signature/Title:    Janeth King RN  RN Care Coordinator  [facility]

## 2022-07-15 NOTE — NURSING NOTE
Pre-procedure Intake  If YES to any questions or NO to having a   Please complete laminated checklist and leave on the computer keyboard for Provider, verbally inform provider if able.    For SCS Trial, RFA's or any sedation procedure:  Have you been fasting? NA    If yes, for how long?     Are you taking any any blood thinners such as Coumadin, Warfarin, Jantoven, Pradaxa Xarelto, Eliquis, Edoxaban, Enoxaparin, Lovenox, Heparin, Arixtra, Fondaparinux, or Fragmin? OR Antiplatelet medication such as Plavix, Brilinta, or Effient?   No     If yes, when did you take your last dose?     Do you take aspirin?  Yes    If cervical procedure, have you held aspirin for 6 days?   No     Do you have any allergies to contrast dye, iodine, steroid and/or numbing medications?  NO    Are you currently taking antibiotics or have an active infection?  NO    Have you had a fever/elevated temperature within the past week? NO    Are you currently taking oral steroids? NO    Do you have a ? Yes    Are you pregnant or breastfeeding?  Not Applicable    Have you received the COVID-19 vaccine? Yes    If yes, was it your 1st, 2nd or only dose needed?     Date of most recent vaccine: 5/26/22    Notify provider and RNs if systolic BP >170, diastolic BP >100, P >100 or O2 sats < 90%

## 2022-07-15 NOTE — PROGRESS NOTES
Pre procedure Diagnosis:lumbosacral spondylosis   Post procedure Diagnosis: Same  Procedure performed: right L4-S1 medial branch block  Indication:  Diagnostic   Anesthesia: none  Complications: none  Operators: Luiz Eastman MD   Indications:   Mina Ames is a 66 year old male. The patient has a history of right>left axial lbp.  Exam shows +++ and pain with extension/rotation, and they have tried conservative treatment including PHYSICAL THERAPY  and meds.    Options/alternatives, benefits and risks were discussed with the patient including but not limited to bleeding, infection, tissue trauma, exposure to radiation, reaction to medications, spinal cord injury, weakness, numbness and paralysis.  Questions were answered to her satisfaction and she agrees to proceed. Voluntary informed consent was obtained and signed.     Vitals were reviewed: Yes  Allergies were reviewed:  Yes   Medications were reviewed:  Yes   Pre-procedure pain score: 4/10    Procedure:  After obtaining signed informed consent, the patient was brought into the procedure suite and was placed in a prone position on the procedure table.   A Pause for the Cause was performed.  The patient was prepped and draped in the usual sterile fashion.     Under AP fluoroscopic guidance the L3, L4, L5 vertebral bodies were identified. The C-arm was rotated to the oblique view to afford optimal visualization the pedicles.  Lidocaine 1% was used to anesthetize the skin at each level.  Under intermittent fluoroscopy, 25G 3.5inch spinal needles were positioned inferior and lateral to the intersection of the transverse process and pedicle at the Right L4 & L5 levels sacral ala. The needle positions were verified and optimized from the AP view.    The anatomic targets for the L3 & L4 medial nerve and L5 dorsal ramus (which functionally incorporates the medial branch) were the  L4 & L5 transverse processes and sacral alar notch, with laterality as described  above, resulting in blockade of the L4/5 and L5/S1 facet joints.    Bupivacaine 0.25% 1 ml was injected at each location.  The needles were removed.      Hemostasis was achieved, the area was cleaned, and bandaids were placed when appropriate.  The patient tolerated the procedure well, and was taken to the recovery room.    Images were saved to PACS.     The patient will continue to monitor progress, and they were given a pain diary to complete at home.  They will either fax or mail this back to us or bring it to their next appointment. We will determine the treatment plan after we review the diary.      Post-procedure pain score: 1/10  Follow-up includes:   -f/u phone call in one week  -will await diary for further planning.    Luiz Eastman MD  [facility]

## 2022-07-22 ENCOUNTER — OFFICE VISIT (OUTPATIENT)
Dept: FAMILY MEDICINE | Facility: CLINIC | Age: 67
End: 2022-07-22
Payer: COMMERCIAL

## 2022-07-22 ENCOUNTER — MYC MEDICAL ADVICE (OUTPATIENT)
Dept: PALLIATIVE MEDICINE | Facility: CLINIC | Age: 67
End: 2022-07-22

## 2022-07-22 ENCOUNTER — TELEPHONE (OUTPATIENT)
Dept: PALLIATIVE MEDICINE | Facility: CLINIC | Age: 67
End: 2022-07-22

## 2022-07-22 VITALS
TEMPERATURE: 97.8 F | HEART RATE: 83 BPM | DIASTOLIC BLOOD PRESSURE: 82 MMHG | RESPIRATION RATE: 20 BRPM | BODY MASS INDEX: 33.47 KG/M2 | SYSTOLIC BLOOD PRESSURE: 130 MMHG | HEIGHT: 69 IN | OXYGEN SATURATION: 97 % | WEIGHT: 226 LBS

## 2022-07-22 DIAGNOSIS — I10 HYPERTENSION GOAL BP (BLOOD PRESSURE) < 140/90: ICD-10-CM

## 2022-07-22 DIAGNOSIS — Z00.00 ENCOUNTER FOR MEDICARE ANNUAL WELLNESS EXAM: Primary | ICD-10-CM

## 2022-07-22 DIAGNOSIS — M47.816 SPONDYLOSIS OF LUMBAR REGION WITHOUT MYELOPATHY OR RADICULOPATHY: Primary | ICD-10-CM

## 2022-07-22 DIAGNOSIS — E78.5 HYPERLIPIDEMIA LDL GOAL <160: ICD-10-CM

## 2022-07-22 DIAGNOSIS — M54.50 CHRONIC LOW BACK PAIN WITHOUT SCIATICA, UNSPECIFIED BACK PAIN LATERALITY: ICD-10-CM

## 2022-07-22 DIAGNOSIS — E66.811 CLASS 1 OBESITY DUE TO EXCESS CALORIES WITH SERIOUS COMORBIDITY AND BODY MASS INDEX (BMI) OF 33.0 TO 33.9 IN ADULT: ICD-10-CM

## 2022-07-22 DIAGNOSIS — Z13.1 SCREENING FOR DIABETES MELLITUS: ICD-10-CM

## 2022-07-22 DIAGNOSIS — Z20.822 ENCOUNTER FOR LABORATORY TESTING FOR COVID-19 VIRUS: ICD-10-CM

## 2022-07-22 DIAGNOSIS — G89.29 CHRONIC LOW BACK PAIN WITHOUT SCIATICA, UNSPECIFIED BACK PAIN LATERALITY: ICD-10-CM

## 2022-07-22 DIAGNOSIS — E66.09 CLASS 1 OBESITY DUE TO EXCESS CALORIES WITH SERIOUS COMORBIDITY AND BODY MASS INDEX (BMI) OF 33.0 TO 33.9 IN ADULT: ICD-10-CM

## 2022-07-22 DIAGNOSIS — R73.09 ELEVATED GLUCOSE: ICD-10-CM

## 2022-07-22 PROCEDURE — 99214 OFFICE O/P EST MOD 30 MIN: CPT | Mod: 25 | Performed by: NURSE PRACTITIONER

## 2022-07-22 PROCEDURE — 92551 PURE TONE HEARING TEST AIR: CPT | Performed by: NURSE PRACTITIONER

## 2022-07-22 PROCEDURE — 90471 IMMUNIZATION ADMIN: CPT | Performed by: NURSE PRACTITIONER

## 2022-07-22 PROCEDURE — 90677 PCV20 VACCINE IM: CPT | Performed by: NURSE PRACTITIONER

## 2022-07-22 PROCEDURE — 80048 BASIC METABOLIC PNL TOTAL CA: CPT | Performed by: NURSE PRACTITIONER

## 2022-07-22 PROCEDURE — 36415 COLL VENOUS BLD VENIPUNCTURE: CPT | Performed by: NURSE PRACTITIONER

## 2022-07-22 PROCEDURE — 80061 LIPID PANEL: CPT | Performed by: NURSE PRACTITIONER

## 2022-07-22 PROCEDURE — G0402 INITIAL PREVENTIVE EXAM: HCPCS | Performed by: NURSE PRACTITIONER

## 2022-07-22 RX ORDER — HYDROCHLOROTHIAZIDE 25 MG/1
25 TABLET ORAL DAILY
Qty: 90 TABLET | Refills: 3 | Status: SHIPPED | OUTPATIENT
Start: 2022-07-22 | End: 2023-03-06

## 2022-07-22 RX ORDER — ATORVASTATIN CALCIUM 40 MG/1
40 TABLET, FILM COATED ORAL DAILY
Qty: 90 TABLET | Refills: 3 | Status: SHIPPED | OUTPATIENT
Start: 2022-07-22 | End: 2023-03-06

## 2022-07-22 RX ORDER — CELECOXIB 200 MG/1
200 CAPSULE ORAL DAILY
Qty: 90 CAPSULE | Refills: 3 | Status: SHIPPED | OUTPATIENT
Start: 2022-07-22 | End: 2023-03-06

## 2022-07-22 ASSESSMENT — ACTIVITIES OF DAILY LIVING (ADL): CURRENT_FUNCTION: NO ASSISTANCE NEEDED

## 2022-07-22 ASSESSMENT — ENCOUNTER SYMPTOMS
SORE THROAT: 0
COUGH: 0
JOINT SWELLING: 0
HEARTBURN: 0
PALPITATIONS: 0
SHORTNESS OF BREATH: 0
PARESTHESIAS: 0
CONSTIPATION: 0
CHILLS: 0
HEMATOCHEZIA: 0
DIZZINESS: 0
MYALGIAS: 1
WEAKNESS: 0
EYE PAIN: 0
DIARRHEA: 0
HEADACHES: 0
DYSURIA: 0
HEMATURIA: 0
FEVER: 0
NERVOUS/ANXIOUS: 0
FREQUENCY: 0
ARTHRALGIAS: 0
NAUSEA: 0
ABDOMINAL PAIN: 0

## 2022-07-22 ASSESSMENT — PAIN SCALES - GENERAL: PAINLEVEL: NO PAIN (0)

## 2022-07-22 NOTE — PATIENT INSTRUCTIONS
Patient Education   Personalized Prevention Plan  You are due for the preventive services outlined below.  Your care team is available to assist you in scheduling these services.  If you have already completed any of these items, please share that information with your care team to update in your medical record.  Health Maintenance Due   Topic Date Due     ANNUAL REVIEW OF HM ORDERS  Never done     Zoster (Shingles) Vaccine (2 of 2) 03/30/2021     Pneumococcal Vaccine (1 - PCV) Never done     AORTIC ANEURYSM SCREENING (SYSTEM ASSIGNED)  Never done     Annual Wellness Visit  02/02/2022        Patient Education   Personalized Prevention Plan  You are due for the preventive services outlined below.  Your care team is available to assist you in scheduling these services.  If you have already completed any of these items, please share that information with your care team to update in your medical record.  Health Maintenance Due   Topic Date Due     ANNUAL REVIEW OF HM ORDERS  Never done     Zoster (Shingles) Vaccine (2 of 2) 03/30/2021     Pneumococcal Vaccine (1 - PCV) Never done     AORTIC ANEURYSM SCREENING (SYSTEM ASSIGNED)  Never done       Understanding USDA MyPlate  The USDA has guidelines to help you make healthy food choices. These are called MyPlate. MyPlate shows the food groups that make up healthy meals using the image of a place setting. Before you eat, think about the healthiest choices for what to put on your plate or in your cup or bowl. To learn more about building a healthy plate, visit www.choosemyplate.gov.    The food groups    Fruits. Any fruit or 100% fruit juice counts as part of the Fruit Group. Fruits may be fresh, canned, frozen, or dried, and may be whole, cut-up, or pureed. Make 1/2 of your plate fruits and vegetables.    Vegetables. Any vegetable or 100% vegetable juice counts as a member of the Vegetable Group. Vegetables may be fresh, frozen, canned, or dried. They can be served raw or  cooked and may be whole, cut-up, or mashed. Make 1/2 of your plate fruits and vegetables.    Grains. All foods made from grains are part of the Grains Group. These include wheat, rice, oats, cornmeal, and barley. Grains are often used to make foods such as bread, pasta, oatmeal, cereal, tortillas, and grits. Grains should be no more than 1/4 of your plate. At least half of your grains should be whole grains.    Protein. This group includes meat, poultry, seafood, beans and peas, eggs, processed soy products (such as tofu), nuts (including nut butters), and seeds. Make protein choices no more than 1/4 of your plate. Meat and poultry choices should be lean or low fat.    Dairy. The Dairy Group includes all fluid milk products and foods made from milk that contain calcium, such as yogurt and cheese. (Foods that have little calcium, such as cream, butter, and cream cheese, are not part of this group.) Most dairy choices should be low-fat or fat-free.    Oils. Oils aren't a food group, but they do contain essential nutrients. However it's important to watch your intake of oils. These are fats that are liquid at room temperature. They include canola, corn, olive, soybean, vegetable, and sunflower oil. Foods that are mainly oil include mayonnaise, certain salad dressings, and soft margarines. You likely already get your daily oil allowance from the foods you eat.  Things to limit  Eating healthy also means limiting these things in your diet:       Salt (sodium). Many processed foods have a lot of sodium. To keep sodium intake down, eat fresh vegetables, meats, poultry, and seafood when possible. Purchase low-sodium, reduced-sodium, or no-salt-added food products at the store. And don't add salt to your meals at home. Instead, season them with herbs and spices such as dill, oregano, cumin, and paprika. Or try adding flavor with lemon or lime zest and juice.    Saturated fat. Saturated fats are most often found in animal  products such as beef, pork, and chicken. They are often solid at room temperature, such as butter. To reduce your saturated fat intake, choose leaner cuts of meat and poultry. And try healthier cooking methods such as grilling, broiling, roasting, or baking. For a simple lower-fat swap, use plain nonfat yogurt instead of mayonnaise when making potato salad or macaroni salad.    Added sugars. These are sugars added to foods. They are in foods such as ice cream, candy, soda, fruit drinks, sports drinks, energy drinks, cookies, pastries, jams, and syrups. Cut down on added sugars by sharing sweet treats with a family member or friend. You can also choose fruit for dessert, and drink water or other unsweetened beverages.     [employer] last reviewed this educational content on 6/1/2020 2000-2021 [employer]. All rights reserved. This information is not intended as a substitute for professional medical care. Always follow your healthcare professional's instructions.

## 2022-07-22 NOTE — TELEPHONE ENCOUNTER
Pt had a right L4-S1 medial branch block # 1 on 7/15/22.  The post medial branch block form was received.      According to pain diary pt would like to proceed with medial branch block #2.    Max relief from block is:    Pt had significant on the day of the block. (80 to 10%)    Physical therapy:      Last done in?:  1/7/22.     How many sessions?: 4    Where was it  done?  [facility].      If pt has not done PT does it needs to be ordered/started in order to have the radiofrequency ablation?  DEVIN    Called Mina to double check no other PT was done in last 6 months & if willing to participate if not- left VM & sent [employer] message    Routed to Hanane to review. Does pt had enough relief insurance-wise to proceed to medial branch block #2?  If so please schedule.  Is PT required again?  MF

## 2022-07-22 NOTE — PROGRESS NOTES
"SUBJECTIVE:   Mina Ames is a 66 year old male who presents for Preventive Visit.      Patient has been advised of split billing requirements and indicates understanding: Yes  Are you in the first 12 months of your Medicare coverage?  Yes,  Visual Acuity:  Right Eye: 25   Left Eye: 25 -2  Both Eyes: 25 -2    Healthy Habits:     In general, how would you rate your overall health?  Good    Frequency of exercise:  4-5 days/week    Duration of exercise:  30-45 minutes    Do you usually eat at least 4 servings of fruit and vegetables a day, include whole grains    & fiber and avoid regularly eating high fat or \"junk\" foods?  No    Taking medications regularly:  Yes    Medication side effects:  None    Ability to successfully perform activities of daily living:  No assistance needed    Home Safety:  No safety concerns identified    Hearing Impairment:  No hearing concerns    In the past 6 months, have you been bothered by leaking of urine?  No    In general, how would you rate your overall mental or emotional health?  Good      PHQ-2 Total Score: 0    Additional concerns today:  No    Had recent lumbar spine injection 7/15/22 that helps  Followed by Orthopedic       Do you feel safe in your environment? YES    Have you ever done Advance Care Planning? (For example, a Health Directive, POLST, or a discussion with a medical provider or your loved ones about your wishes): Yes, advance care planning is on file.      Right Ear:      1000 Hz RESPONSE- on Level: 40 db (Conditioning sound)   1000 Hz: RESPONSE- on Level: 45 db   2000 Hz: RESPONSE- on Level: 40 db   4000 Hz: RESPONSE- on Level: 50 db    Left Ear:      4000 Hz: RESPONSE- on Level: 45 db   2000 Hz: RESPONSE- on Level: 30 db   1000 Hz: RESPONSE- on Level: 40 db    500 Hz: RESPONSE- on Level: 35 db    Right Ear:    500 Hz: RESPONSE- on Level: 45 db    Hearing Acuity: REFER    Hearing Assessment: abnormal--monitor   Fall risk  Fallen 2 or more times in the past " year?: No  Any fall with injury in the past year?: No    Cognitive Screening   1) Repeat 3 items (Leader, Season, Table)    2) Clock draw: NORMAL  3) 3 item recall: Recalls 2 objects   Results: NORMAL clock, 1-2 items recalled: COGNITIVE IMPAIRMENT LESS LIKELY    Mini-CogTM Copyright CHANDAN Alex. Licensed by the author for use in [facility]; reprinted with permission (lucille@[facility]). All rights reserved.      Do you have sleep apnea, excessive snoring or daytime drowsiness?: no    Reviewed and updated as needed this visit by clinical staff   Tobacco  Allergies  Meds  Problems  Med Hx  Surg Hx  Fam Hx            Reviewed and updated as needed this visit by Provider   Tobacco  Allergies  Meds  Problems  Med Hx  Surg Hx  Fam Hx           Social History     Tobacco Use     Smoking status: Never Smoker     Smokeless tobacco: Never Used   Substance Use Topics     Alcohol use: No     If you drink alcohol do you typically have >3 drinks per day or >7 drinks per week? No    No flowsheet data found.      Current providers sharing in care for this patient include:   Patient Care Team:  No Ref-Primary, Physician as PCP - Geronimo Kruse DO as Assigned Musculoskeletal Provider  Escobar Curry PA-C as Assigned PCP    The following health maintenance items are reviewed in Epic and correct as of today:  Health Maintenance Due   Topic Date Due     ZOSTER IMMUNIZATION (2 of 2) 03/30/2021     AORTIC ANEURYSM SCREENING (SYSTEM ASSIGNED)  Never done     BP Readings from Last 3 Encounters:   07/22/22 130/82   07/15/22 128/86   06/17/22 132/70    Wt Readings from Last 3 Encounters:   07/22/22 102.5 kg (226 lb)   06/17/22 102.1 kg (225 lb)   01/03/22 102.1 kg (225 lb)                  Patient Active Problem List   Diagnosis     Hyperlipidemia     DJD (degenerative joint disease) of knee     HYPERLIPIDEMIA LDL GOAL <160     Class 1 obesity due to excess calories with serious comorbidity and body  mass index (BMI) of 33.0 to 33.9 in adult     Past Surgical History:   Procedure Laterality Date     COLONOSCOPY  2016     SURGICAL HISTORY OF -   1995    wrist     ZZC APPENDECTOMY  1971       Social History     Tobacco Use     Smoking status: Never Smoker     Smokeless tobacco: Never Used   Substance Use Topics     Alcohol use: No     Family History   Problem Relation Age of Onset     Hypertension Mother      Heart Disease Father      Hypertension Father      Lipids Father      Depression Father      Coronary Artery Disease Father         Bypass surgery - 3          Current Outpatient Medications   Medication Sig Dispense Refill     aspirin 81 MG tablet Take 81 mg by mouth daily       atorvastatin (LIPITOR) 40 MG tablet Take 1 tablet (40 mg) by mouth daily 90 tablet 3     celecoxib (CELEBREX) 200 MG capsule Take 1 capsule (200 mg) by mouth daily 90 capsule 3     hydrochlorothiazide (HYDRODIURIL) 25 MG tablet Take 1 tablet (25 mg) by mouth daily 90 tablet 3     lidocaine (XYLOCAINE) 5 % external ointment Apply topically as needed for moderate pain 50 g 1     Multiple Vitamin (MULTI VITAMIN  MENS) TABS        omeprazole 20 MG tablet Take 20 mg by mouth daily       Allergies   Allergen Reactions     Nkda [No Known Drug Allergies]          Review of Systems   Constitutional: Negative for chills and fever.   HENT: Negative for congestion, ear pain, hearing loss and sore throat.    Eyes: Negative for pain and visual disturbance.   Respiratory: Negative for cough and shortness of breath.    Cardiovascular: Negative for chest pain, palpitations and peripheral edema.   Gastrointestinal: Negative for abdominal pain, constipation, diarrhea, heartburn, hematochezia and nausea.   Genitourinary: Negative for dysuria, frequency, genital sores, hematuria, impotence, penile discharge and urgency.   Musculoskeletal: Positive for myalgias. Negative for arthralgias and joint swelling.   Skin: Negative for rash.   Neurological:  "Negative for dizziness, weakness, headaches and paresthesias.   Psychiatric/Behavioral: Negative for mood changes. The patient is not nervous/anxious.      OBJECTIVE:   /82 (BP Location: Right arm)   Pulse 83   Temp 97.8  F (36.6  C) (Oral)   Resp 20   Ht 1.753 m (5' 9\")   Wt 102.5 kg (226 lb)   SpO2 97%   BMI 33.37 kg/m   Estimated body mass index is 33.37 kg/m  as calculated from the following:    Height as of this encounter: 1.753 m (5' 9\").    Weight as of this encounter: 102.5 kg (226 lb).  Physical Exam  GENERAL: healthy, alert and no distress  EYES: Eyes grossly normal to inspection, PERRL and conjunctivae and sclerae normal  HENT: ear canals and TM's normal, nose and mouth without ulcers or lesions  NECK: no adenopathy, no asymmetry, masses, or scars and thyroid normal to palpation  RESP: lungs clear to auscultation - no rales, rhonchi or wheezes  CV: regular rate and rhythm, normal S1 S2, no S3 or S4, no murmur, click or rub, no peripheral edema and peripheral pulses strong  ABDOMEN: soft, nontender, bowel sounds normal and scar RLQ, left lower abdomen protrudes more compared to right lower when standing   (male): normal male genitalia without lesions or urethral discharge, no hernia  RECTAL: normal sphincter tone, no rectal masses, prostate normal size, smooth, nontender without nodules or masses  MS: no gross musculoskeletal defects noted, no edema  SKIN: no suspicious lesions or rashes  NEURO: Normal strength and tone, mentation intact and speech normal  PSYCH: mentation appears normal, affect normal/bright    Diagnostic Test Results:  Labs reviewed in Epic    ASSESSMENT / PLAN:   Mina was seen today for physical.    Diagnoses and all orders for this visit:    Encounter for Medicare annual wellness exam    Hypertension goal BP (blood pressure) < 140/90  Chronic, stable, continue current treatment.   -     Basic metabolic panel  (Ca, Cl, CO2, Creat, Gluc, K, Na, BUN); Future  -     " "hydrochlorothiazide (HYDRODIURIL) 25 MG tablet; Take 1 tablet (25 mg) by mouth daily  -     Basic metabolic panel  (Ca, Cl, CO2, Creat, Gluc, K, Na, BUN)    Hyperlipidemia LDL goal <160  Chronic, stable, continue current treatment.   -     Lipid panel reflex to direct LDL Fasting; Future  -     atorvastatin (LIPITOR) 40 MG tablet; Take 1 tablet (40 mg) by mouth daily  -     Lipid panel reflex to direct LDL Fasting    Chronic low back pain without sciatica, unspecified back pain laterality  -     celecoxib (CELEBREX) 200 MG capsule; Take 1 capsule (200 mg) by mouth daily    Class 1 obesity due to excess calories with serious comorbidity and body mass index (BMI) of 33.0 to 33.9 in adult    Other orders  -     REVIEW OF HEALTH MAINTENANCE PROTOCOL ORDERS  -     PNEUMOCOCCAL 20 VALENT CONJUGATE (PREVNAR 20)          COUNSELING:  Reviewed preventive health counseling, as reflected in patient instructions       Regular exercise       Healthy diet/nutrition    Estimated body mass index is 33.37 kg/m  as calculated from the following:    Height as of this encounter: 1.753 m (5' 9\").    Weight as of this encounter: 102.5 kg (226 lb).    Weight management plan: Discussed healthy diet and exercise guidelines    He reports that he has never smoked. He has never used smokeless tobacco.      Appropriate preventive services were discussed with this patient, including applicable screening as appropriate for cardiovascular disease, diabetes, osteopenia/osteoporosis, and glaucoma.  As appropriate for age/gender, discussed screening for colorectal cancer, prostate cancer, breast cancer, and cervical cancer. Checklist reviewing preventive services available has been given to the patient.    Reviewed patients plan of care and provided an AVS. The Basic Care Plan (routine screening as documented in Health Maintenance) for Mina meets the Care Plan requirement. This Care Plan has been established and reviewed with the " Patient.    Counseling Resources:  ATP IV Guidelines  Pooled Cohorts Equation Calculator  Breast Cancer Risk Calculator  Breast Cancer: Medication to Reduce Risk  FRAX Risk Assessment  ICSI Preventive Guidelines  Dietary Guidelines for Americans, 2010  USDA's MyPlate  ASA Prophylaxis  Lung CA Screening    Madai Stevens NP  [facility]    Identified Health Risks:

## 2022-07-23 LAB
ANION GAP SERPL CALCULATED.3IONS-SCNC: 6 MMOL/L (ref 3–14)
BUN SERPL-MCNC: 24 MG/DL (ref 7–30)
CALCIUM SERPL-MCNC: 9.5 MG/DL (ref 8.5–10.1)
CHLORIDE BLD-SCNC: 107 MMOL/L (ref 94–109)
CHOLEST SERPL-MCNC: 151 MG/DL
CO2 SERPL-SCNC: 26 MMOL/L (ref 20–32)
CREAT SERPL-MCNC: 1.25 MG/DL (ref 0.66–1.25)
FASTING STATUS PATIENT QL REPORTED: YES
GFR SERPL CREATININE-BSD FRML MDRD: 64 ML/MIN/1.73M2
GLUCOSE BLD-MCNC: 134 MG/DL (ref 70–99)
HDLC SERPL-MCNC: 49 MG/DL
LDLC SERPL CALC-MCNC: 60 MG/DL
NONHDLC SERPL-MCNC: 102 MG/DL
POTASSIUM BLD-SCNC: 4.4 MMOL/L (ref 3.4–5.3)
SODIUM SERPL-SCNC: 139 MMOL/L (ref 133–144)
TRIGL SERPL-MCNC: 212 MG/DL

## 2022-07-25 NOTE — TELEPHONE ENCOUNTER
Okay to schedule.    Pt informed via mychart that PT is not needed and only 1 block was required before proceeding to the radiofrequency ablation.       NATALIA Mcneil-BSN  [facility]

## 2022-07-25 NOTE — TELEPHONE ENCOUNTER
See the 7/22/22 encounter for more information.  PT may not be needed.  A message was sent to Hanane to review.    Ewa Begum RN-BSN  [facility]

## 2022-07-25 NOTE — TELEPHONE ENCOUNTER
[facility] Medical Policy- No PA required        [facility] Commercial:  Thermal Radiofrequency Ablation of facet joint nerves is proven and medically necessary for the following:    Initial treatment of chronic cervical (C3 and below), thoracic and lumbar pain when:  - Clinical documentation shows a Functional Impairment (A physical or functional or physiological impairment causes deviation from the normal function of a tissue or organ. This results in a significantly limited, impaired or delayed capacity to move, coordinate actions or perform physical activities and is exhibited by difficulties in one or more of the following areas: physical and motor tasks; independent movement; performing basic life functions) due to facet pain; and  - Clinical documentation of a diagnostic *Facet Joint Injection and/or Facet Nerve Block (i.e., Medial Branch Block) to localize the source of spinal pain to the facet joint confirms the following:  o At least a 50% reduction in pain from baseline at the specific side and level of the proposed ablation; and  o The reduction in pain is sufficient to allow a measurable functional improvement; and  - The diagnostic procedure is not performed on the same day as the ablation procedure        No Patient does not need to do PT again, patient also does not need a 2nd MBB. No PA is required, he is okay to schedule Lumbar RFA      Hanane HANNAH    [facility]

## 2022-07-26 NOTE — TELEPHONE ENCOUNTER
Screening Questions for RFA Procedure      Procedure ordered? Lumbar RFA  What insurance are we billing for this procedure?  [facility]   IF SCHEDULING IN [place], IT IS OKAY TO SCHEDULE. [place] HANDLES THEIR OWN PA'S AFTER THE PATIENT IS SCHEDULED. PLEASE SCHEDULE AT LEAST 1 WEEK OUT SO A PA CAN BE OBTAINED.    Has patient had this injection before? No  This procedure requires that a COVID-19 lab test be done; Either a PCR test from [facility] or a Rapid Home Antigen test.  What test would Pt like to do?  PCR    If PCR test from [facility] - Pt must have within 4 days of procedure, let patient know that someone will call them to schedule the COVID-19 test and that they will only receive a call back if the result is positive. Route to nursing to enter order.     If Rapid Home Antigen Test - Pt must have within 2 days of procedure and bring a picture of the negative test with them to the appointment.  Any chance of pregnancy? Not Applicable   If YES, do NOT schedule and route to RN pool     Is  Needed?: No  Will patient have a ?  Yes   If pt is given sedation meds, no driving for 24 hours.  Is pt taking a cab or transportation service? NO        If so will need to be accompanied by an adult too (friend/family member) in order for IV sedation to be given.      Per [place] Policy:  Outpatients are to have responsible adult or family member to accompany them at discharge and drive them home. A service providing medically trained drivers or attendants would be acceptable. Public transportation would not be acceptable unless the patient is accompanied by a responsible adult or family member.  Is patient taking any blood thinners (i.e. plavix, coumadin, jantoven, warfarin, heparin, pradaxa or dabigatran, etc)? No   If YES, do NOT schedule, and route to RN pool    Is patient taking any aspirin products? Yes - Pt takes 81mg daily; instructed to hold 0 day(s) prior to procedure.      If more than  325mg/day, OK to schedule; Instruct pt to decrease to less than 325 mg for 7 days AND route to RN pool    For CERVICAL procedures, hold all aspirin products for 6 days.     Tell pt that if aspirin product is not held for 6 days, the procedure WILL BE cancelled.      Does the patient have a bleeding or clotting disorder? No     If YES, it it OKAY to schedule AND route to RN pool    **For any patients with platelet count <100, must be forwarded to provider**    Is patient diabetic? No If YES, have them bring their glucometer.    Does patient have an active infection or treated for one within the past week? No   If YES, do NOT schedule and route to RN nurse pool     Is patient currently taking any antibiotics?  No    For patients on chronic, preventative, or prophylactic antibiotics, procedures may be scheduled.     For patients on antibiotics for active or recent infection:antibiotic course must have been completed for 4 days    Is patient currently taking any steroid medications? (i.e. Prednisone, Medrol)  No     For patients on steroid medications, course must have been completed for 4 days    Is patient actively being treated for cancer or immunocompromised, including the spleen having been removed? No  If YES, do NOT schedule and route to RN pool     Any history of complications with sedation medications?  NO   If YES, OK to schedule AND route to RN pool     Any history of sleep apnea?  NO   If YES, OK to schedule AND route to RN pool     Any cardiac history?  NO   If YES, OK to schedule AND route to RN pool     Do you have an implanted pacemaker, ICD (implanted cardiac device) or AICD (automatic implanted cardiac device)?  NO    If YES, do NOT schedule AND route to RN pool.     Obtain name of device :       Obtain name of cardiologist:       Do you have an implanted stimulator?  NO    If YES, OK to schedule AND route to nursing.     Instruct patient to bring in the remote to the appointment and it  will need to be turned off.  reviewed and       Does patient have an allergy to contrast dye, iodine or shellfish?  No   If YES, OK to schedule. Route to RN pool AND add allergy information to appointment notes    Are you able to get on and off an exam table with minimal or no assistance? Yes   If NO, do NOT schedule and route to RN pool    Are you able to roll over and lay on your stomach with minimal or no assistance? Yes   If NO, do NOT schedule and route to RN pool    Reminders:    If you are started on any steroids or antibiotics between now and your appointment, you must contact us because it may affect our ability to perform your procedure.  Yes    Informed patient that s/he needs to fast for 6 hours before procedure?  YES    Informed patient that it is OK to take normal medications with sips of water, especially blood pressure medications, before the procedure and must hold blood thinners as instructed.  Yes    Informed patient to arrive 30 minutes before procedure time to have an IV inserted.  reviewed   Do NOT schedule at 0745, 0815 or 1245.  reviewed     All radiofrequency ablations are in a 90 minute time slot.  reviewed

## 2022-08-22 ENCOUNTER — LAB (OUTPATIENT)
Dept: LAB | Facility: CLINIC | Age: 67
End: 2022-08-22
Attending: PAIN MEDICINE
Payer: COMMERCIAL

## 2022-08-22 DIAGNOSIS — Z20.822 ENCOUNTER FOR LABORATORY TESTING FOR COVID-19 VIRUS: ICD-10-CM

## 2022-08-22 PROCEDURE — U0005 INFEC AGEN DETEC AMPLI PROBE: HCPCS

## 2022-08-22 PROCEDURE — U0003 INFECTIOUS AGENT DETECTION BY NUCLEIC ACID (DNA OR RNA); SEVERE ACUTE RESPIRATORY SYNDROME CORONAVIRUS 2 (SARS-COV-2) (CORONAVIRUS DISEASE [COVID-19]), AMPLIFIED PROBE TECHNIQUE, MAKING USE OF HIGH THROUGHPUT TECHNOLOGIES AS DESCRIBED BY CMS-2020-01-R: HCPCS

## 2022-08-23 LAB — SARS-COV-2 RNA RESP QL NAA+PROBE: NEGATIVE

## 2022-08-25 ENCOUNTER — RADIOLOGY INJECTION OFFICE VISIT (OUTPATIENT)
Dept: PALLIATIVE MEDICINE | Facility: CLINIC | Age: 67
End: 2022-08-25
Payer: COMMERCIAL

## 2022-08-25 VITALS — OXYGEN SATURATION: 96 % | DIASTOLIC BLOOD PRESSURE: 120 MMHG | SYSTOLIC BLOOD PRESSURE: 145 MMHG | HEART RATE: 87 BPM

## 2022-08-25 DIAGNOSIS — M47.816 SPONDYLOSIS OF LUMBAR REGION WITHOUT MYELOPATHY OR RADICULOPATHY: ICD-10-CM

## 2022-08-25 PROCEDURE — 64636 DESTROY L/S FACET JNT ADDL: CPT | Mod: RT | Performed by: PAIN MEDICINE

## 2022-08-25 PROCEDURE — 64635 DESTROY LUMB/SAC FACET JNT: CPT | Mod: RT | Performed by: PAIN MEDICINE

## 2022-08-25 PROCEDURE — 99153 MOD SED SAME PHYS/QHP EA: CPT | Performed by: PAIN MEDICINE

## 2022-08-25 PROCEDURE — 99152 MOD SED SAME PHYS/QHP 5/>YRS: CPT | Performed by: PAIN MEDICINE

## 2022-08-25 RX ORDER — FENTANYL CITRATE 50 UG/ML
12.5-5 INJECTION, SOLUTION INTRAMUSCULAR; INTRAVENOUS EVERY 5 MIN PRN
Status: DISCONTINUED | OUTPATIENT
Start: 2022-08-25 | End: 2023-07-21

## 2022-08-25 RX ORDER — KETOROLAC TROMETHAMINE 30 MG/ML
15-30 INJECTION, SOLUTION INTRAMUSCULAR; INTRAVENOUS ONCE
Status: DISCONTINUED | OUTPATIENT
Start: 2022-08-25 | End: 2023-07-21

## 2022-08-25 ASSESSMENT — PAIN SCALES - GENERAL: PAINLEVEL: MILD PAIN (2)

## 2022-08-25 NOTE — PATIENT INSTRUCTIONS
[facility]   Radiofrequency Ablation (RFA) Discharge Instructions     Today you saw:     Dr. Luiz Eastman,        You should anticipate procedural pain for up to 2 weeks.   You may receive a prescription for pain medication and you should take this as directed.   It may take up to 8 weeks to receive relief from the RFA   Follow up with your provider in 8 weeks.   If you received sedation before, during or after your procedure, for the next 24 hours you shall NOT:    -Drive    -Operate machinery    -Drink alcohol    -Sign any legal documents   You may resume your normal diet   You may resume your regular medications after the procedure   If you were holding your blood thinning medication, please restart taking it: N/A  Be cautious with walking. Numbness and/or weakness in the lower extremities may occur for up to 6-8 hours due to effect of local anesthetic  Avoid strenuous activity for the first 24 hours   You may resume your regular activities after 24 hours   You may shower, however no swimming, tub baths or hot tubs for 24 hours following your procedure   You may use ice packs 10-15 minutes three to four times a day at the injection site for comfort   Do not use heat to painful areas for 6 to 8 hours. This will give the local anesthetic time to wear off and prevent you from accidentally burning your skin.   Unless you have been directed to avoid the use of anti-inflammatory medications (NSAIDS), you may use medications such as ibuprofen, Aleve or Tylenol for pain control if needed.   If you experience any of the following, call the Pain Clinic during work hours (Monday through Friday 8 am-4:30 pm) at [phone] or the Provider Line after hours at [phone]:   -Fever over 100 degree F    -Swelling, bleeding, redness, drainage, warmth at the injection site    -Progressive weakness or numbness in your legs or arms    -Loss of bowel or bladder function    -Unusual headache that  is not relieved by Tylenol    -Unusual new onset of pain that is not improving

## 2022-08-25 NOTE — PROGRESS NOTES
Pre procedure Diagnosis: lumbosacral spondylosis  Post procedure Diagnosis: Same  Procedure performed: lumbosacral radiofrequency ablation at Right L4, L5, sacral ala, fluoroscopically guided  Anesthesia:      MD Time IN: 1050   Sedation start time:  1057  Sedation end time:  1127     Medications given:  versed 2 MG mg IV  Complications: none  Operators: Luiz Eastman MD     Indications:   Mina Ames is a 67 year old male. The patient has a history of right axial lbp.  Exam shows + kemps and they have tried conservative treatment including .    Patient had one diagnostic medial branch blocks showing appropriate pain relief, therefore radiofrequency ablation will be done.     Options/alternatives, benefits and risks were discussed with the patient including bleeding, infection, no pain relief, tissue trauma, exposure to radiation, reaction to medications including seizure, spinal cord injury,increased pain after the procedure, weakness, numbness or sensory changes and headache.   We also discussed risks of sedation, including reaction to medications and cardiovascular collapse.    Questions were answered to her satisfaction and she agrees to proceed. Voluntary informed consent was obtained and signed.     Vitals were reviewed: Yes  Allergies were reviewed:  Yes   Medications were reviewed:  Yes   Pre-procedure pain score: 3/10    Procedure:  After obtaining signed, informed consent, the patient was brought into the procedure suite and was placed in a prone position on the procedure table.   A Pause for the Cause was performed.  Patient was prepped and draped in sterile fashion.     PT had an IV line placed prior the procedure.  The C-arm was positioned in the right oblique view to afford optimal view of the L4-L5 vertebral bodies. Lidocaine 1% was used to anesthetize the skin at each level.  At each level, a 10cm, 20G curved radiofrequency cannula with a 10mm active tip was positioned, overlying the  intersection of the transverse process and pedicle at L4 & L5, and was advanced under intermittent fluoroscopy until it contacted the transverse process and notch, and the tip slightly overran that process, just lateral to the mamillary process.  The position of each cannula was verified and optimized in the oblique view and AP views.    In the AP view, another cannula was placed at the sacral alar notch.      Each position was tested for motor and sensory stimulation, and was positioned so that stimulation was negative for stimuli outside the immediate area of the desired lesion.  Sensory stimulation was completed at 50 Hz, with max stimulation up to 0.3V.  Motor stimulation was completed at 2Hz, up to 1.5V.   Bupivacaine 0.5 % 1ml was injected at each level.  After allowing the local anesthetic to set up, a 90 second, 80 degree Celsius lesion was generated at all three levels.    The needles were then rotated within the pathway of the medial branch, and locations were evaluated with repeat imaging.  A second lesion was then generated at each location.    The needles were flushed with the local anesthetic as they were withdrawn.        Hemostasis was achieved, the area was cleaned, and bandaids were placed when appropriate.  The patient tolerated the procedure well, and was taken to the recovery room.    Images were saved to PACS.      Post-procedure pain score: 0/10  Follow-up includes:   -f/u phone call in one week  -post-procedure pain medications: none  -f/u with referring provider 8 weeks    Luiz Eastman MD  [facility]

## 2022-08-25 NOTE — NURSING NOTE
22 gauge Peripheral IV inserted into left hand - attempts: 1    MD Time IN: 1050   Sedation start time:  1057  Sedation end time:  1127    Medications given:  versed 2 MG mg IV  Intravenous fluids were administered, normal saline 50 cc's.  Sedation Level Achieved:  Moderate (conscious) sedation    Discharge Information    IV Discontiued Time:  1145    Amount of Fluid Infused:  50 cc    Discharge Criteria = When patient returns to baseline or as per MD order    Consciousness:  Pt is fully awake    Circulation:  BP +/- 20% of pre-procedure level    Respiration:  Patient is able to breathe deeply    O2 Sat:  Patient is able to maintain O2 Sat >92% on room air    Activity:  Moves 4 extremities on command    Ambulation:  Patient is able to stand and walk or stand and pivot into wheelchair    Dressing:  Clean/dry or No Dressing    Notes:   Discharge instructions and AVS given to patient    Patient meets criteria for discharge?  YES    Admitted to PCU?  No    Responsible adult present to accompany patient home?  Yes    Signature/Title:    Akanksha Pastrana RN  RN Care Coordinator  [facility] [place]

## 2022-08-25 NOTE — NURSING NOTE
Pre-procedure Intake  If YES to any questions or NO to having a   Please complete laminated checklist and leave on the computer keyboard for Provider, verbally inform provider if able.    For SCS Trial, RFA's or any sedation procedure:  Have you been fasting? Yes    If yes, for how long? 6 hrs     Are you taking any any blood thinners such as Coumadin, Warfarin, Jantoven, Pradaxa Xarelto, Eliquis, Edoxaban, Enoxaparin, Lovenox, Heparin, Arixtra, Fondaparinux, or Fragmin? OR Antiplatelet medication such as Plavix, Brilinta, or Effient?   No     If yes, when did you take your last dose?     Do you take aspirin? Yes     If cervical procedure, have you held aspirin for 6 days?  No      Do you have any allergies to contrast dye, iodine, steroid and/or numbing medications?  NO    Are you currently taking antibiotics or have an active infection?  NO    Have you had a fever/elevated temperature within the past week? NO    Are you currently taking oral steroids? NO    Do you have a ? Yes    Are you pregnant or breastfeeding?  Not Applicable    Have you received the COVID-19 vaccine? Yes    If yes, was it your 1st, 2nd or only dose needed?     Date of most recent vaccine: 5/20/22    Notify provider and RNs if systolic BP >170, diastolic BP >100, P >100 or O2 sats < 90%

## 2022-10-09 ENCOUNTER — HEALTH MAINTENANCE LETTER (OUTPATIENT)
Age: 67
End: 2022-10-09

## 2023-03-03 ENCOUNTER — TELEPHONE (OUTPATIENT)
Dept: FAMILY MEDICINE | Facility: CLINIC | Age: 68
End: 2023-03-03
Payer: MEDICARE

## 2023-03-03 DIAGNOSIS — E78.5 HYPERLIPIDEMIA LDL GOAL <160: ICD-10-CM

## 2023-03-03 DIAGNOSIS — G89.29 CHRONIC LOW BACK PAIN WITHOUT SCIATICA, UNSPECIFIED BACK PAIN LATERALITY: ICD-10-CM

## 2023-03-03 DIAGNOSIS — M54.50 CHRONIC LOW BACK PAIN WITHOUT SCIATICA, UNSPECIFIED BACK PAIN LATERALITY: ICD-10-CM

## 2023-03-03 DIAGNOSIS — I10 HYPERTENSION GOAL BP (BLOOD PRESSURE) < 140/90: ICD-10-CM

## 2023-03-03 NOTE — TELEPHONE ENCOUNTER
Reason for Call:  Other     Detailed comments:  Patient is needing new scripts sent to a new mail order pharmacy.  [facility], [address], [place], [place].  1)hydrochlorothiazide (HYDRODIURIL) 25 MG tablet    2)atorvastatin (LIPITOR) 40 MG tablet   3)celecoxib (CELEBREX) 200 MG capsule    Phone Number Patient can be reached at: Home number on file [phone] (home)    Best Time: any    Can we leave a detailed message on this number? YES    Call taken on 3/3/2023 at 3:23 PM by Sherry Agosto

## 2023-03-06 RX ORDER — ATORVASTATIN CALCIUM 40 MG/1
40 TABLET, FILM COATED ORAL DAILY
Qty: 90 TABLET | Refills: 0 | Status: SHIPPED | OUTPATIENT
Start: 2023-03-06 | End: 2023-05-15

## 2023-03-06 RX ORDER — HYDROCHLOROTHIAZIDE 25 MG/1
25 TABLET ORAL DAILY
Qty: 90 TABLET | Refills: 0 | Status: SHIPPED | OUTPATIENT
Start: 2023-03-06 | End: 2023-07-21

## 2023-03-06 RX ORDER — CELECOXIB 200 MG/1
200 CAPSULE ORAL DAILY
Qty: 90 CAPSULE | Refills: 0 | Status: SHIPPED | OUTPATIENT
Start: 2023-03-06 | End: 2023-07-14

## 2023-03-06 NOTE — TELEPHONE ENCOUNTER
Attempt #1 to call patient.     RN left voicemail and requested return call to [facility] [place] at [phone].     Pricila Gavin RN  [facility]: [place]    Pt should have enough medication to last end of April?

## 2023-03-06 NOTE — TELEPHONE ENCOUNTER
"Wife calling on behalf of herself and this patient, her .   She says they need to switch mail order pharmacy.    Switching due to insurance, last Rx's were sent to [facility], now needs to use [facility] mail order.    I see this patient would have another 90 day Rx left on all requested meds.    Resent to [facility] as \"provider recommended\".    Karly Sneed RN  [facility]      "

## 2023-03-25 ENCOUNTER — HEALTH MAINTENANCE LETTER (OUTPATIENT)
Age: 68
End: 2023-03-25

## 2023-03-29 ENCOUNTER — OFFICE VISIT (OUTPATIENT)
Dept: URGENT CARE | Facility: URGENT CARE | Age: 68
End: 2023-03-29
Payer: COMMERCIAL

## 2023-03-29 VITALS
WEIGHT: 230.8 LBS | DIASTOLIC BLOOD PRESSURE: 89 MMHG | BODY MASS INDEX: 34.08 KG/M2 | OXYGEN SATURATION: 94 % | HEART RATE: 90 BPM | TEMPERATURE: 97.6 F | SYSTOLIC BLOOD PRESSURE: 143 MMHG

## 2023-03-29 DIAGNOSIS — K11.20 PAROTITIS: Primary | ICD-10-CM

## 2023-03-29 PROCEDURE — 99213 OFFICE O/P EST LOW 20 MIN: CPT | Performed by: NURSE PRACTITIONER

## 2023-03-29 ASSESSMENT — ENCOUNTER SYMPTOMS
SINUS PAIN: 0
APPETITE CHANGE: 0
FATIGUE: 0
CHILLS: 0
FACIAL SWELLING: 0
SINUS PRESSURE: 0
COUGH: 0
FEVER: 0
SORE THROAT: 0

## 2023-03-29 NOTE — PROGRESS NOTES
Assessment & Plan       ICD-10-CM    1. Parotitis  K11.20 amoxicillin-clavulanate (AUGMENTIN) 875-125 MG tablet           Patient instructions:  Start Augmentin today. If the swelling and pain decreases, finish full course of antibiotic. If symptoms worsen, please go to the ER for further evaluation.     Medical decision making:  Pt presents with pain behind left ear for past 24 hours. Ddx include cyst, parotitis, mastoiditis, AOM among others. I think this is most likely either an infected parotitis or mastoiditis, however as the swelling is very mild at this time and no CT capabilities at this site, opted for treating with Augmentin here today and strict follow up precautions. Pt notified that if symptoms do not improve or if they worsen, to please go the ER for imaging. Pt agrees with plan.     No follow-ups on file.    At the end of the encounter, I discussed results, diagnosis, medications. Discussed red flags for immediate return to clinic/ER, as well as indications for follow up if no improvement. Patient understood and agreed to plan. Patient was stable for discharge.    Sin Enriquez is a 67 year old male who presents to clinic today the following health issues:  Chief Complaint   Patient presents with     Ear Problem     Pain behind left ear. Onset- Tuesday (1 day)     Pt reports sudden onset of swelling and pain behind his left ear that started yesterday. Tender to palpation. Denies any fever, congestion, inner ear pain, ear discharge or other concerns.           Review of Systems   Constitutional: Negative for appetite change, chills, fatigue and fever.   HENT: Negative for congestion, ear discharge, ear pain, facial swelling, hearing loss, sinus pressure, sinus pain and sore throat.    Respiratory: Negative for cough.        Problem List:  2022-07: Class 1 obesity due to excess calories with serious   comorbidity and body mass index (BMI) of 33.0 to 33.9 in adult  2020-11: Acute right-sided low  back pain without sciatica  2019-02: Acute pain of right knee  2010-10: HYPERLIPIDEMIA LDL GOAL <160  Hyperlipidemia  Adult BMI 30+  DJD (degenerative joint disease) of knee      Past Medical History:   Diagnosis Date     Adult BMI 30+      DJD (degenerative joint disease) of knee     xray Oct., 2009     Hyperlipidemia      Squamous cell skin cancer 2007    chest       Social History     Tobacco Use     Smoking status: Never     Smokeless tobacco: Never   Substance Use Topics     Alcohol use: No           Objective    BP (!) 143/89 (BP Location: Left arm, Patient Position: Sitting, Cuff Size: Adult Regular)   Pulse 90   Temp 97.6  F (36.4  C) (Tympanic)   Wt 104.7 kg (230 lb 12.8 oz)   SpO2 94%   BMI 34.08 kg/m    Physical Exam  Constitutional:       General: He is not in acute distress.     Appearance: Normal appearance. He is not ill-appearing, toxic-appearing or diaphoretic.   HENT:      Head: Normocephalic and atraumatic.      Right Ear: Tympanic membrane, ear canal and external ear normal.      Left Ear: Tympanic membrane, ear canal and external ear normal.      Ears:      Comments: Slight tenderness and scant swelling to mastoid process behind left ear. No erythema.     Nose: Nose normal.      Mouth/Throat:      Mouth: Mucous membranes are moist.      Pharynx: No oropharyngeal exudate or posterior oropharyngeal erythema.   Cardiovascular:      Rate and Rhythm: Normal rate and regular rhythm.   Pulmonary:      Effort: Pulmonary effort is normal.      Breath sounds: Normal breath sounds.   Neurological:      Mental Status: He is alert.              BRIGID MAJOR CNP

## 2023-03-29 NOTE — PATIENT INSTRUCTIONS
Start Augmentin today. If the swelling and pain decreases, finish full course of antibiotic. If symptoms worsen, please go to the ER for further evaluation.

## 2023-04-21 NOTE — PROGRESS NOTES
[facility]  [id number]  [facility] [address]  [phone]      Krystle Cade : 2006 MRN: [id number]        2023  Time Session Began:  02:30PM  Time Session Ended:  03:15PM    Session Type:  Therapy 38-52 minutes (62437)    Others Present: None    Intervention:  Insight, Supportive, Dialectical    Patient Level of Functioning:  No Change    Suicide/Homicide/Violence Ideation:  No    If Yes, explain:      Current Outpatient Medications   Medication Sig   • escitalopram (LEXAPRO) 10 MG tablet TAKE 1 TABLET(10 MG) BY MOUTH DAILY   • doxycycline hyclate (VIBRAMYCIN) 100 MG capsule Take 100 mg by mouth daily.   • FLUoxetine (PROzac) 20 MG capsule Take 100 mg by mouth daily.      No current facility-administered medications for this visit.       Change in Medication(s) Reported:  No  If Yes, explain:      Patient/Family Education Provided:  Yes  Patient/Family Displays Understanding:  Yes    If No, explain:      Chief complaint in patient's own words:  \"I noticed I have been more irritable with my friends.  A lot has happened though.\"    Progress Note containing chief complaint and symptoms/problems related to the complaint:    Data. Action, Response, Plan:   Patient stated she will be moving back with her mother soon.  With this, she will be also transferring back to her old school.  Patient discussed frustration she feels with the dynamics at her current school and how it has impacted her personality and mood. She isn't sure how to manage any irritability and anger.   A:   Writer gave patient support regarding her concerns and experiences.  Writer utilized DBT strategies to discuss interpersonal effectiveness skills and acceptance of situations outside of her control.  R: Patient was receptive to feedback.  Patient mood was euthymic and affect was congruent.  Thought process was linear and goal orientated.  P: Patient to follow up  "  Sports Medicine Clinic Visit - Interim History February 25, 2019      PCP: Escobar Curry CLAYTON Ames is a 63 year old male who is seen in f/u up for Closed nondisplaced transverse fracture of right patella with routine healing. Since last visit on 2/4/19, patient has reports of no pain with any activity and has been walking better.  He reports he continues to use the knee brace. 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  - Now ~ 6.5 weeks from initial injury    Social History:  at [employer]    Review of Systems  Skin: no bruising, mild swelling  Musculoskeletal: as above  Neurologic: no numbness, paresthesias  Remainder of review of systems is negative including constitutional, CV, pulmonary, GI, except as noted in HPI or medical history.    Patient's current problem list, past medical and surgical history, and family history were reviewed.    Patient Active Problem List   Diagnosis     Hyperlipidemia     Adult BMI 30+     DJD (degenerative joint disease) of knee     HYPERLIPIDEMIA LDL GOAL <160     Past Medical History:   Diagnosis Date     Adult BMI 30+      DJD (degenerative joint disease) of knee     xray Oct., 2009     Hyperlipidemia      Squamous cell skin cancer 2007    chest     Past Surgical History:   Procedure Laterality Date     C APPENDECTOMY  1971     COLONOSCOPY  2016     SURGICAL HISTORY OF -   1995    wrist     Family History   Problem Relation Age of Onset     Hypertension Mother      Heart Disease Father      Hypertension Father      Lipids Father      Depression Father      Coronary Artery Disease Father         Bypass surgery - 3        Objective  /86 (BP Location: Left arm, Patient Position: Chair, Cuff Size: Adult Regular)   Ht 1.765 m (5' 9.5\")   Wt 95.3 kg (210 lb)   BMI 30.57 kg/m      GENERAL APPEARANCE: healthy, alert and no distress   GAIT: NORMAL  SKIN: no suspicious lesions or rashes  HEENT: Sclera clear, anicteric  CV: good peripheral pulses  RESP: " Breathing not labored  NEURO: Normal strength and tone, mentation intact and speech normal  PSYCH:  mentation appears normal and affect normal/bright    Bilateral Knee exam  Inspection:             mild swelling right     Patella:      Mobility -       hypomobile right     Tender:      Anterior patella and with compression (mild)     Non Tender:      remainder of knee area right     Knee ROM:      Flexion 120 degrees right       Extension 5 degrees right  (similar to left     Strength:      SLR intact, 5/5 strength with knee flexion     Special Tests:     neg (-) Jessica's, otherwise difficult special testing due to guarding     Gait:      normal     Lower Extremity Alignment:      Normal     Neurovascular:      2+ peripheral pulses bilaterally and brisk capillary refill       sensation grossly intact    Radiology  I ordered, visualized and reviewed these images with the patient  2 XR views of right knee reviewed: significant degenerative changes, healing transverse fracture  - will follow official read      Assessment:  1. Closed nondisplaced transverse fracture of right patella with routine healing      Start PT for strengthening, follow up in 2 weeks.    Plan:  - Today's Plan of Care:  Rehab: Physical Therapy: [facility] - [phone]  Letter for work provided    Follow Up: 2-3 weeks with no x ray    Concerning signs and symptoms were reviewed.  The patient expressed understanding of this management plan and all questions were answered at this time.    Mirolsava Parks MD [facility]  Primary Care Sports Medicine  [facility]   in 3 weeks.  Patient to practice skills learned in session.      Need for Community Resources Assessed:  Yes    Resources Needed:  No    If Yes, what resources:      Primary Diagnosis:  F41.1 Generalized anxiety disorder; F40.10 Social anxiety disorder; R/O Depressive Disorder    Treatment Plan:  Unchanged    Discharge Plan:  Strategies Discussed to Maintain Gains, Continue with [facility]    Next Appointment:  05.12.2023      Flora Araiza LPC [facility]

## 2023-05-15 DIAGNOSIS — E78.5 HYPERLIPIDEMIA LDL GOAL <160: ICD-10-CM

## 2023-05-15 RX ORDER — ATORVASTATIN CALCIUM 40 MG/1
40 TABLET, FILM COATED ORAL DAILY
Qty: 90 TABLET | Refills: 0 | Status: SHIPPED | OUTPATIENT
Start: 2023-05-15 | End: 2023-07-21

## 2023-06-28 ENCOUNTER — IMMUNIZATION (OUTPATIENT)
Dept: FAMILY MEDICINE | Facility: CLINIC | Age: 68
End: 2023-06-28
Payer: COMMERCIAL

## 2023-06-28 DIAGNOSIS — Z23 ENCOUNTER FOR IMMUNIZATION: Primary | ICD-10-CM

## 2023-06-28 PROCEDURE — 0124A COVID-19 BIVALENT 12+ (PFIZER): CPT

## 2023-06-28 PROCEDURE — 91312 COVID-19 BIVALENT 12+ (PFIZER): CPT

## 2023-06-28 PROCEDURE — 99207 PR NO CHARGE NURSE ONLY: CPT

## 2023-07-14 DIAGNOSIS — M54.50 CHRONIC LOW BACK PAIN WITHOUT SCIATICA, UNSPECIFIED BACK PAIN LATERALITY: ICD-10-CM

## 2023-07-14 DIAGNOSIS — G89.29 CHRONIC LOW BACK PAIN WITHOUT SCIATICA, UNSPECIFIED BACK PAIN LATERALITY: ICD-10-CM

## 2023-07-14 RX ORDER — CELECOXIB 200 MG/1
CAPSULE ORAL
Qty: 90 CAPSULE | Refills: 3 | Status: SHIPPED | OUTPATIENT
Start: 2023-07-14 | End: 2024-06-13

## 2023-07-14 ASSESSMENT — ENCOUNTER SYMPTOMS
DIZZINESS: 0
DIARRHEA: 0
HEMATURIA: 0
COUGH: 0
JOINT SWELLING: 0
ABDOMINAL PAIN: 0
MYALGIAS: 1
WEAKNESS: 0
NAUSEA: 0
SHORTNESS OF BREATH: 0
FREQUENCY: 1
NERVOUS/ANXIOUS: 0
EYE PAIN: 0
FEVER: 0
CHILLS: 0
HEADACHES: 0
ARTHRALGIAS: 0
PALPITATIONS: 0
HEMATOCHEZIA: 0
DYSURIA: 0
SORE THROAT: 0
PARESTHESIAS: 0
CONSTIPATION: 0
HEARTBURN: 0

## 2023-07-14 ASSESSMENT — ACTIVITIES OF DAILY LIVING (ADL): CURRENT_FUNCTION: NO ASSISTANCE NEEDED

## 2023-07-21 ENCOUNTER — OFFICE VISIT (OUTPATIENT)
Dept: FAMILY MEDICINE | Facility: CLINIC | Age: 68
End: 2023-07-21
Payer: COMMERCIAL

## 2023-07-21 VITALS
TEMPERATURE: 97.8 F | SYSTOLIC BLOOD PRESSURE: 126 MMHG | HEIGHT: 69 IN | OXYGEN SATURATION: 96 % | HEART RATE: 95 BPM | BODY MASS INDEX: 34 KG/M2 | DIASTOLIC BLOOD PRESSURE: 60 MMHG | WEIGHT: 229.6 LBS | RESPIRATION RATE: 22 BRPM

## 2023-07-21 DIAGNOSIS — I10 HYPERTENSION GOAL BP (BLOOD PRESSURE) < 140/90: ICD-10-CM

## 2023-07-21 DIAGNOSIS — Z12.5 SCREENING FOR PROSTATE CANCER: ICD-10-CM

## 2023-07-21 DIAGNOSIS — Z00.00 ENCOUNTER FOR MEDICARE ANNUAL WELLNESS EXAM: Primary | ICD-10-CM

## 2023-07-21 DIAGNOSIS — E66.09 CLASS 1 OBESITY DUE TO EXCESS CALORIES WITH SERIOUS COMORBIDITY AND BODY MASS INDEX (BMI) OF 33.0 TO 33.9 IN ADULT: ICD-10-CM

## 2023-07-21 DIAGNOSIS — E66.811 CLASS 1 OBESITY DUE TO EXCESS CALORIES WITH SERIOUS COMORBIDITY AND BODY MASS INDEX (BMI) OF 33.0 TO 33.9 IN ADULT: ICD-10-CM

## 2023-07-21 DIAGNOSIS — E78.5 HYPERLIPIDEMIA LDL GOAL <160: ICD-10-CM

## 2023-07-21 LAB
ERYTHROCYTE [DISTWIDTH] IN BLOOD BY AUTOMATED COUNT: 13.1 % (ref 10–15)
HCT VFR BLD AUTO: 47.8 % (ref 40–53)
HGB BLD-MCNC: 16.2 G/DL (ref 13.3–17.7)
MCH RBC QN AUTO: 30.1 PG (ref 26.5–33)
MCHC RBC AUTO-ENTMCNC: 33.9 G/DL (ref 31.5–36.5)
MCV RBC AUTO: 89 FL (ref 78–100)
PLATELET # BLD AUTO: 244 10E3/UL (ref 150–450)
RBC # BLD AUTO: 5.39 10E6/UL (ref 4.4–5.9)
WBC # BLD AUTO: 9.2 10E3/UL (ref 4–11)

## 2023-07-21 PROCEDURE — G0439 PPPS, SUBSEQ VISIT: HCPCS | Performed by: NURSE PRACTITIONER

## 2023-07-21 PROCEDURE — 80053 COMPREHEN METABOLIC PANEL: CPT | Performed by: NURSE PRACTITIONER

## 2023-07-21 PROCEDURE — 80061 LIPID PANEL: CPT | Performed by: NURSE PRACTITIONER

## 2023-07-21 PROCEDURE — 36415 COLL VENOUS BLD VENIPUNCTURE: CPT | Performed by: NURSE PRACTITIONER

## 2023-07-21 PROCEDURE — 99214 OFFICE O/P EST MOD 30 MIN: CPT | Mod: 25 | Performed by: NURSE PRACTITIONER

## 2023-07-21 PROCEDURE — 85027 COMPLETE CBC AUTOMATED: CPT | Performed by: NURSE PRACTITIONER

## 2023-07-21 PROCEDURE — G0103 PSA SCREENING: HCPCS | Performed by: NURSE PRACTITIONER

## 2023-07-21 RX ORDER — ATORVASTATIN CALCIUM 40 MG/1
40 TABLET, FILM COATED ORAL DAILY
Qty: 90 TABLET | Refills: 3 | Status: SHIPPED | OUTPATIENT
Start: 2023-07-21 | End: 2024-05-12

## 2023-07-21 RX ORDER — HYDROCHLOROTHIAZIDE 25 MG/1
25 TABLET ORAL DAILY
Qty: 90 TABLET | Refills: 3 | Status: SHIPPED | OUTPATIENT
Start: 2023-07-21 | End: 2024-03-08

## 2023-07-21 ASSESSMENT — ENCOUNTER SYMPTOMS
CONSTIPATION: 0
HEADACHES: 0
DIZZINESS: 0
SHORTNESS OF BREATH: 0
JOINT SWELLING: 0
ABDOMINAL PAIN: 0
NAUSEA: 0
HEMATOCHEZIA: 0
MYALGIAS: 1
HEARTBURN: 0
DYSURIA: 0
COUGH: 0
ARTHRALGIAS: 0
PALPITATIONS: 0
WEAKNESS: 0
NERVOUS/ANXIOUS: 0
FREQUENCY: 1
PARESTHESIAS: 0
SORE THROAT: 0
EYE PAIN: 0
HEMATURIA: 0
FEVER: 0
DIARRHEA: 0
CHILLS: 0

## 2023-07-21 ASSESSMENT — PAIN SCALES - GENERAL: PAINLEVEL: NO PAIN (0)

## 2023-07-21 ASSESSMENT — ACTIVITIES OF DAILY LIVING (ADL): CURRENT_FUNCTION: NO ASSISTANCE NEEDED

## 2023-07-21 NOTE — PROGRESS NOTES
"SUBJECTIVE:   Mina is a 67 year old who presents for Preventive Visit.      7/21/2023    12:06 PM   Additional Questions   Roomed by William HILLMAN.     Are you in the first 12 months of your Medicare coverage?  No    Healthy Habits:     In general, how would you rate your overall health?  Good    Frequency of exercise:  4-5 days/week    Duration of exercise:  30-45 minutes    Do you usually eat at least 4 servings of fruit and vegetables a day, include whole grains    & fiber and avoid regularly eating high fat or \"junk\" foods?  No    Taking medications regularly:  Yes    Medication side effects:  None    Ability to successfully perform activities of daily living:  No assistance needed    Home Safety:  No safety concerns identified    Hearing Impairment:  No hearing concerns    In the past 6 months, have you been bothered by leaking of urine? Yes    In general, how would you rate your overall mental or emotional health?  Good    Additional concerns today:  No    Fast foods often 3-4 days per week.     Working 2 pm to midnight.  Snack when at work- cheese sticks, granola bar    Have you ever done Advance Care Planning? (For example, a Health Directive, POLST, or a discussion with a medical provider or your loved ones about your wishes): Yes, advance care planning is on file.       Fall risk  Fallen 2 or more times in the past year?: No  Any fall with injury in the past year?: No    Cognitive Screening   1) Repeat 3 items (Leader, Season, Table)    2) Clock draw: abnormal  3) 3 item recall: Recalls 3 objects  Results: 3 items recalled: COGNITIVE IMPAIRMENT LESS LIKELY    Mini-CogTM Copyright CHANDAN Alex. Licensed by the author for use in [facility]; reprinted with permission (lucille@.[facility]). All rights reserved.      Do you have sleep apnea, excessive snoring or daytime drowsiness?: no    Reviewed and updated as needed this visit by clinical staff   Tobacco  Allergies  Meds  Problems  Med Hx  Surg Hx  Fam Hx   "        Reviewed and updated as needed this visit by Provider    Allergies  Meds  Problems  Med Hx  Surg Hx  Fam Hx         Social History     Tobacco Use     Smoking status: Never     Smokeless tobacco: Never   Substance Use Topics     Alcohol use: No             7/14/2023     1:32 PM   Alcohol Use   Prescreen: >3 drinks/day or >7 drinks/week? Not Applicable          No data to display              Do you have a current opioid prescription? No  Do you use any other controlled substances or medications that are not prescribed by a provider? None      Hyperlipidemia Follow-Up      Are you regularly taking any medication or supplement to lower your cholesterol?   Yes- atorvastatin    Are you having muscle aches or other side effects that you think could be caused by your cholesterol lowering medication?  No    Hypertension Follow-up      Do you check your blood pressure regularly outside of the clinic? No     Are you following a low salt diet? No    Are your blood pressures ever more than 140 on the top number (systolic) OR more   than 90 on the bottom number (diastolic), for example 140/90? n/a      Current providers sharing in care for this patient include: \  Patient Care Team:  Madai Stevens NP as PCP - General  Geronimo Gomes DO as Assigned Musculoskeletal Provider  Madai Stevens NP as Assigned PCP    The following health maintenance items are reviewed in Epic and correct as of today:  Health Maintenance   Topic Date Due     A1C  Never done     BMP  07/22/2023     LIPID  07/22/2023     INFLUENZA VACCINE (1) 09/01/2023     MEDICARE ANNUAL WELLNESS VISIT  07/21/2024     ANNUAL REVIEW OF HM ORDERS  07/21/2024     FALL RISK ASSESSMENT  07/21/2024     DTAP/TDAP/TD IMMUNIZATION (2 - Td or Tdap) 06/28/2026     COLORECTAL CANCER SCREENING  07/26/2026     ADVANCE CARE PLANNING  07/21/2028     HEPATITIS C SCREENING  Completed     PHQ-2 (once per calendar year)  Completed     Pneumococcal Vaccine: 65+  Years  Completed     ZOSTER IMMUNIZATION  Completed     COVID-19 Vaccine  Completed     IPV IMMUNIZATION  Aged Out     MENINGITIS IMMUNIZATION  Aged Out     AORTIC ANEURYSM SCREENING (SYSTEM ASSIGNED)  Discontinued     BP Readings from Last 3 Encounters:   07/21/23 126/60   03/29/23 (!) 143/89   08/25/22 (!) 145/120    Wt Readings from Last 3 Encounters:   07/21/23 104.1 kg (229 lb 9.6 oz)   03/29/23 104.7 kg (230 lb 12.8 oz)   07/22/22 102.5 kg (226 lb)                  Patient Active Problem List   Diagnosis     DJD (degenerative joint disease) of knee     HYPERLIPIDEMIA LDL GOAL <160     Class 1 obesity due to excess calories with serious comorbidity and body mass index (BMI) of 33.0 to 33.9 in adult     Hypertension goal BP (blood pressure) < 140/90     Past Surgical History:   Procedure Laterality Date     COLONOSCOPY  2016     SURGICAL HISTORY OF -   1995    wrist     ZZC APPENDECTOMY  1971       Social History     Tobacco Use     Smoking status: Never     Smokeless tobacco: Never   Substance Use Topics     Alcohol use: No     Family History   Problem Relation Age of Onset     Hypertension Mother      Heart Disease Father      Hypertension Father      Lipids Father      Depression Father      Coronary Artery Disease Father         Bypass surgery - 3          Current Outpatient Medications   Medication Sig Dispense Refill     aspirin 81 MG tablet Take 81 mg by mouth daily       atorvastatin (LIPITOR) 40 MG tablet Take 1 tablet (40 mg) by mouth daily 90 tablet 3     celecoxib (CELEBREX) 200 MG capsule TAKE 1 CAPSULE BY MOUTH DAILY 90 capsule 3     hydrochlorothiazide (HYDRODIURIL) 25 MG tablet Take 1 tablet (25 mg) by mouth daily 90 tablet 3     Multiple Vitamin (MULTI VITAMIN  MENS) TABS        omeprazole 20 MG tablet Take 20 mg by mouth daily       Allergies   Allergen Reactions     Nkda [No Known Drug Allergy]              Review of Systems   Constitutional: Negative for chills and fever.   HENT: Negative for  "congestion, ear pain, hearing loss and sore throat.    Eyes: Negative for pain and visual disturbance.   Respiratory: Negative for cough and shortness of breath.    Cardiovascular: Negative for chest pain, palpitations and peripheral edema.   Gastrointestinal: Negative for abdominal pain, constipation, diarrhea, heartburn, hematochezia and nausea.   Genitourinary: Positive for frequency, impotence and urgency. Negative for dysuria, genital sores, hematuria and penile discharge.   Musculoskeletal: Positive for myalgias. Negative for arthralgias and joint swelling.   Skin: Negative for rash.   Neurological: Negative for dizziness, weakness, headaches and paresthesias.   Psychiatric/Behavioral: Negative for mood changes. The patient is not nervous/anxious.          OBJECTIVE:   /60 (BP Location: Left arm, Patient Position: Sitting, Cuff Size: Adult Large)   Pulse 95   Temp 97.8  F (36.6  C) (Oral)   Resp 22   Ht 1.753 m (5' 9\")   Wt 104.1 kg (229 lb 9.6 oz)   SpO2 96%   BMI 33.91 kg/m   Estimated body mass index is 33.91 kg/m  as calculated from the following:    Height as of this encounter: 1.753 m (5' 9\").    Weight as of this encounter: 104.1 kg (229 lb 9.6 oz).  Physical Exam  GENERAL: healthy, alert, no distress and obese  EYES: Eyes grossly normal to inspection, PERRL and conjunctivae and sclerae normal  HENT: ear canals and TM's normal, nose and mouth without ulcers or lesions  NECK: no adenopathy, no asymmetry, masses, or scars and thyroid normal to palpation  RESP: lungs clear to auscultation - no rales, rhonchi or wheezes  CV: regular rate and rhythm, normal S1 S2, no S3 or S4, no murmur, click or rub, no peripheral edema and peripheral pulses strong  ABDOMEN: soft, nontender, no hepatosplenomegaly, no masses and bowel sounds normal   (male): normal male genitalia without lesions or urethral discharge, no hernia  RECTAL: normal sphincter tone, no rectal masses, prostate normal size, smooth, " nontender without nodules or masses  MS: no gross musculoskeletal defects noted, no edema  SKIN: no suspicious lesions or rashes  NEURO: Normal strength and tone, mentation intact and speech normal  PSYCH: mentation appears normal, affect normal/bright    Diagnostic Test Results:  Labs reviewed in Epic  Results for orders placed or performed in visit on 07/21/23 (from the past 24 hour(s))   CBC with platelets   Result Value Ref Range    WBC Count 9.2 4.0 - 11.0 10e3/uL    RBC Count 5.39 4.40 - 5.90 10e6/uL    Hemoglobin 16.2 13.3 - 17.7 g/dL    Hematocrit 47.8 40.0 - 53.0 %    MCV 89 78 - 100 fL    MCH 30.1 26.5 - 33.0 pg    MCHC 33.9 31.5 - 36.5 g/dL    RDW 13.1 10.0 - 15.0 %    Platelet Count 244 150 - 450 10e3/uL       ASSESSMENT / PLAN:   Mina was seen today for wellness visit.    Diagnoses and all orders for this visit:    Encounter for Medicare annual wellness exam  -     CBC with platelets; Future  -     PSA, screen; Future  -     CBC with platelets  -     PSA, screen    Hypertension goal BP (blood pressure) < 140/90  Chronic, stable, continue current treatment.   -     hydrochlorothiazide (HYDRODIURIL) 25 MG tablet; Take 1 tablet (25 mg) by mouth daily  -     Comprehensive metabolic panel (BMP + Alb, Alk Phos, ALT, AST, Total. Bili, TP); Future  -     Comprehensive metabolic panel (BMP + Alb, Alk Phos, ALT, AST, Total. Bili, TP)    Hyperlipidemia LDL goal <160  Chronic, stable, continue current treatment.   -     atorvastatin (LIPITOR) 40 MG tablet; Take 1 tablet (40 mg) by mouth daily  -     Lipid panel reflex to direct LDL Fasting; Future  -     Lipid panel reflex to direct LDL Fasting    Screening for prostate cancer  -     PSA, screen; Future  -     PSA, screen    Class 1 obesity due to excess calories with serious comorbidity and body mass index (BMI) of 33.0 to 33.9 in adult    Other orders  -     REVIEW OF HEALTH MAINTENANCE PROTOCOL ORDERS  -     PRIMARY CARE FOLLOW-UP SCHEDULING;  "Future          COUNSELING:  Reviewed preventive health counseling, as reflected in patient instructions       Regular exercise       Healthy diet/nutrition      BMI:   Estimated body mass index is 33.91 kg/m  as calculated from the following:    Height as of this encounter: 1.753 m (5' 9\").    Weight as of this encounter: 104.1 kg (229 lb 9.6 oz).   Weight management plan: Discussed healthy diet and exercise guidelines      He reports that he has never smoked. He has never used smokeless tobacco.      Appropriate preventive services were discussed with this patient, including applicable screening as appropriate for cardiovascular disease, diabetes, osteopenia/osteoporosis, and glaucoma.  As appropriate for age/gender, discussed screening for colorectal cancer, prostate cancer, breast cancer, and cervical cancer. Checklist reviewing preventive services available has been given to the patient.    Reviewed patients plan of care and provided an AVS. The Basic Care Plan (routine screening as documented in Health Maintenance) for Mina meets the Care Plan requirement. This Care Plan has been established and reviewed with the Patient.          Madai Stevens NP  [facility]    Identified Health Risks:    "

## 2023-07-21 NOTE — PATIENT INSTRUCTIONS
You are due for the Pneumovax (PPSV23) now- can go the Pharmacy next door for this    Patient Education   Personalized Prevention Plan  You are due for the preventive services outlined below.  Your care team is available to assist you in scheduling these services.  If you have already completed any of these items, please share that information with your care team to update in your medical record.  Health Maintenance Due   Topic Date Due    A1C Lab  Never done    AORTIC ANEURYSM SCREENING (SYSTEM ASSIGNED)  Never done    Annual Wellness Visit  07/22/2023    Basic Metabolic Panel  07/22/2023    Cholesterol Lab  07/22/2023    ANNUAL REVIEW OF HM ORDERS  07/22/2023     Learning About Dietary Guidelines  What are the Dietary Guidelines for Americans?     Dietary Guidelines for Americans provide tips for eating well and staying healthy. This helps reduce the risk for long-term (chronic) diseases.  These guidelines recommend that you:  Eat and drink the right amount for you. The U.[place] government's food guide is called MyPlate. It can help you make your own well-balanced eating plan.  Try to balance your eating with your activity. This helps you stay at a healthy weight.  Drink alcohol in moderation, if at all.  Limit foods high in salt, saturated fat, trans fat, and added sugar.  These guidelines are from the U.S. Department of Agriculture and the U.S. Department of Health and Human Services. They are updated every 5 years.  What is MyPlate?  MyPlate is the U.[place] government's food guide. It can help you make your own well-balanced eating plan. A balanced eating plan means that you eat enough, but not too much, and that your food gives you the nutrients you need to stay healthy.  MyPlate focuses on eating plenty of whole grains, fruits, and vegetables, and on limiting fat and sugar. It is available online at www.ChooseMyPlate.gov.  How can you get started?  If you're trying to eat healthier, you can slowly change your eating  "habits over time. You don't have to make big changes all at once. Start by adding one or two healthy foods to your meals each day.  Grains  Choose whole-grain breads and cereals and whole-wheat pasta and whole-grain crackers.  Vegetables  Eat a variety of vegetables every day. They have lots of nutrients and are part of a heart-healthy diet.  Fruits  Eat a variety of fruits every day. Fruits contain lots of nutrients. Choose fresh fruit instead of fruit juice.  Protein foods  Choose fish and lean poultry more often. Eat red meat and fried meats less often. Dried beans, tofu, and nuts are also good sources of protein.  Dairy  Choose low-fat or fat-free products from this food group. If you have problems digesting milk, try eating cheese or yogurt instead.  Fats and oils  Limit fats and oils if you're trying to cut calories. Choose healthy fats when you cook. These include canola oil and olive oil.  Where can you learn more?  Go to https://www.[employer].net/patiented  Enter D676 in the search box to learn more about \"Learning About Dietary Guidelines.\"  Current as of: March 1, 2023               Content Version: 13.7    9394-4135 [employer].   Care instructions adapted under license by your healthcare professional. If you have questions about a medical condition or this instruction, always ask your healthcare professional. [employer] disclaims any warranty or liability for your use of this information.        Understanding USDA MyPlate  The USDA has guidelines to help you make healthy food choices. These are called MyPlate. MyPlate shows the food groups that make up healthy meals using the image of a place setting. Before you eat, think about the healthiest choices for what to put on your plate or in your cup or bowl. To learn more about building a healthy plate, visit www.myplate.gov.     The food groups  Fruits. Any fruit or 100% fruit juice counts as part of the Fruit Group. Fruits may " be fresh, canned, frozen, or dried, and may be whole, cut-up, or pureed. Make 1/2 of your plate fruits and vegetables.  Vegetables. Any vegetable or 100% vegetable juice counts as a member of the Vegetable Group. Vegetables may be fresh, frozen, canned, or dried. They can be served raw or cooked and may be whole, cut-up, or mashed. Make 1/2 of your plate fruits and vegetables.  Grains. All foods made from grains are part of the Grains Group. These include wheat, rice, oats, cornmeal, and barley. Grains are often used to make foods such as bread, pasta, oatmeal, cereal, tortillas, and grits. Grains should be no more than 1/4 of your plate. At least half of your grains should be whole grains.  Protein. This group includes meat, poultry, seafood, beans and peas, eggs, processed soy products (such as tofu), nuts (including nut butters), and seeds. Make protein choices no more than 1/4 of your plate. Meat and poultry choices should be lean or low fat.  Dairy. The Dairy Group includes all fluid milk products and foods made from milk that contain calcium, such as yogurt and cheese. (Foods that have little calcium, such as cream, butter, and cream cheese, are not part of this group.) Most dairy choices should be low-fat or fat-free.  Things to limit  Eating healthy also means limiting these things in your diet:  Oils. Oils aren't a food group, but they do contain essential nutrients. These are fats that are liquid at room temperature. Including small amounts of oils in your diet is fine and can even be good for you. But it's important to watch how much oil you include in your diet. Oils include avocado, canola, corn, olive, soybean, vegetable, and sunflower. Foods that are mainly oil include mayonnaise, certain salad dressings, and soft margarines. You likely already get your daily oil allowance from the foods you eat.  Salt (sodium).  Many processed foods have a lot of sodium. To keep sodium intake down, eat fresh  vegetables, meats, poultry, and seafood when possible. Buy low-sodium, reduced-sodium, or no-salt-added food products at the store. And don't add salt to your meals at home. Instead, season them with herbs and spices such as dill, oregano, cumin, and paprika. Or try adding flavor with vinegar, onion, garlic, or lemon or lime zest and juice.  Saturated fat . Saturated fats are most often found in animal products such as beef, pork, and chicken. They are often solid at room temperature, such as butter. To reduce your saturated fat intake, choose leaner cuts of meat and poultry. And try healthier cooking methods such as grilling, broiling, roasting, or baking. For a simple lower-fat swap, use plain nonfat yogurt instead of mayonnaise when making potato salad or macaroni salad.  Added sugars.  These are sugars added to foods. They are in foods such as ice cream, candy, soda, fruit drinks, sports drinks, energy drinks, cookies, pastries, jams, and syrups. Cut down on added sugars by sharing sweet treats with a family member or friend. You can also choose fruit for dessert, and drink water or other unsweetened beverages.  Alcohol. Alcohol contains calories but few nutrients. If you don't drink alcohol, don't start drinking for any reason. But if you do choose to drink alcohol, do so only in moderation. This means no more than 2 drinks in a day for men and no more than 1 drink per day for women, on days when you have alcohol.  66. com last reviewed this educational content on 1/1/2023 2000-2023 [employer]. All rights reserved. This information is not intended as a substitute for professional medical care. Always follow your healthcare professional's instructions.        Bladder Training: Care Instructions  Your Care Instructions     Bladder training is used to treat urge incontinence and stress incontinence. Urge incontinence means that the need to urinate comes on so fast that you can't get to a toilet  in time. Stress incontinence means that you leak urine because of pressure on your bladder. For example, it may happen when you laugh, cough, or lift something heavy.  Bladder training can increase how long you can wait before you have to urinate. It can also help your bladder hold more urine. And it can give you better control over the urge to urinate.  It is important to remember that bladder training takes a few weeks to a few months to make a difference. You may not see results right away, but don't give up.  Follow-up care is a key part of your treatment and safety. Be sure to make and go to all appointments, and call your doctor if you are having problems. It's also a good idea to know your test results and keep a list of the medicines you take.  How can you care for yourself at home?  Work with your doctor to come up with a bladder training program that is right for you. You may use one or more of the following methods.  Delayed urination  In the beginning, try to keep from urinating for 5 minutes after you first feel the need to go.  While you wait, take deep, slow breaths to relax. Kegel exercises can also help you delay the need to go to the bathroom.  After some practice, when you can easily wait 5 minutes to urinate, try to wait 10 minutes before you urinate.  Slowly increase the waiting period until you are able to control when you have to urinate.  Scheduled urination  Empty your bladder when you first wake up in the morning.  Schedule times throughout the day when you will urinate.  Start by going to the bathroom every hour, even if you don't need to go.  Slowly increase the time between trips to the bathroom.  When you have found a schedule that works well for you, keep doing it.  If you wake up during the night and have to urinate, do it. Apply your schedule to waking hours only.  Kegel exercises  These tighten and strengthen pelvic muscles, which can help you control the flow of urine. (If doing  "these exercises causes pain, stop doing them and talk with your doctor.) To do Kegel exercises:  Squeeze your muscles as if you were trying not to pass gas. Or squeeze your muscles as if you were stopping the flow of urine. Your belly, legs, and buttocks shouldn't move.  Hold the squeeze for 3 seconds, then relax for 5 to 10 seconds.  Start with 3 seconds, then add 1 second each week until you are able to squeeze for 10 seconds.  Repeat the exercise 10 times a session. Do 3 to 8 sessions a day.  When should you call for help?  Watch closely for changes in your health, and be sure to contact your doctor if:    Your incontinence is getting worse.     You do not get better as expected.   Where can you learn more?  Go to https://www.[employer].net/patiented  Enter V684 in the search box to learn more about \"Bladder Training: Care Instructions.\"  Current as of: March 1, 2023               Content Version: 13.7    6847-6408 [employer].   Care instructions adapted under license by your healthcare professional. If you have questions about a medical condition or this instruction, always ask your healthcare professional. [employer] disclaims any warranty or liability for your use of this information.        Urinary Incontinence (Male)  We understand that gender is a spectrum. We may use gendered terms to talk about anatomy and health risk. Please use this sheet in a way that works best for you and your provider as you talk about your care.     Urinary incontinence means not being able to control the release of urine from the bladder.   Causes  Common causes of urinary incontinence in men include:  Infection  Certain medicines  Aging  Poor pelvic muscle tone  Bladder spasms  Obesity  Enlarged prostate  Trouble urinating and fully emptying the bladder (urinary retention)  Other things that can cause incontinence are:   Nervous system diseases  Diabetes  Sleep apnea  Urinary tract infections  Prostate " surgery  Pelvic injury  Constipation and smoking have also been identified as risk factors.   Symptoms  Urge incontinence (overactive bladder). This is a sudden urge to urinate. It occurs even though there may not be much urine in the bladder. The need to urinate often during the night is common. It's due to overactive bladder muscles.  Stress incontinence. This is urine leakage that you can't control. It can occur with sneezing, coughing, and other actions that put stress on the bladder.    Treatment  Treatment depends on what is causing the condition. Bladder infections are treated with antibiotics. Urinary retention is treated with a bladder catheter.   Home care  Follow these guidelines when caring for yourself at home:  Don't have any foods and drinks that may irritate the bladder. This includes:  Chocolate  Alcohol  Caffeine  Carbonated drinks  Acidic fruits and juices  Limit fluids to 6 to 8 cups a day.  Lose weight if you are overweight. This may reduce your symptoms.  If advised, do regular pelvic muscle-strengthening exercises such as Kegel exercises.  If needed, wear absorbent pads to catch urine. Change the pads often. This is for good hygiene and to prevent skin and bladder infections.  Bathe daily for good hygiene.  If an antibiotic was prescribed to treat a bladder infection, take it until it's finished. Keep taking it even if you are feeling better. This is to make sure your infection has cleared.  If a catheter was left in place, keep bacteria from getting into the collection bag. Don't disconnect the catheter from the collection bag.  Use a leg band to secure the catheter drainage tube, so it does not pull on the catheter. Drain the collection bag when it becomes full. To do this, use the drain spout at the bottom of the bag. Don't disconnect the bag from the catheter.  Don't pull on or try to remove a catheter. The catheter must be removed by a healthcare provider.  If you smoke, stop. Ask your  provider for help if you can't do this on your own.  Follow-up care  Follow up with your healthcare provider, or as advised.  When to get medical advice  Call your healthcare provider right away if any of these occur:   Fever over 100.4 F (38 C), or as directed by your provider  Bladder pain or fullness  Belly swelling, nausea, or vomiting  Back pain  Weakness, dizziness, or fainting  If a catheter was left in place, return if:  The catheter falls out  The catheter stops draining for 6 hours  Your urine gets cloudy or smells bad  Luís last reviewed this educational content on 6/1/2022 2000-2022 [employer]. All rights reserved. This information is not intended as a substitute for professional medical care. Always follow your healthcare professional's instructions.

## 2023-07-22 LAB
ALBUMIN SERPL BCG-MCNC: 4.5 G/DL (ref 3.5–5.2)
ALP SERPL-CCNC: 111 U/L (ref 40–129)
ALT SERPL W P-5'-P-CCNC: 20 U/L (ref 0–70)
ANION GAP SERPL CALCULATED.3IONS-SCNC: 13 MMOL/L (ref 7–15)
AST SERPL W P-5'-P-CCNC: 31 U/L (ref 0–45)
BILIRUB SERPL-MCNC: 0.7 MG/DL
BUN SERPL-MCNC: 21.8 MG/DL (ref 8–23)
CALCIUM SERPL-MCNC: 9.8 MG/DL (ref 8.8–10.2)
CHLORIDE SERPL-SCNC: 101 MMOL/L (ref 98–107)
CHOLEST SERPL-MCNC: 143 MG/DL
CREAT SERPL-MCNC: 1.38 MG/DL (ref 0.67–1.17)
DEPRECATED HCO3 PLAS-SCNC: 25 MMOL/L (ref 22–29)
GFR SERPL CREATININE-BSD FRML MDRD: 56 ML/MIN/1.73M2
GLUCOSE SERPL-MCNC: 124 MG/DL (ref 70–99)
HDLC SERPL-MCNC: 46 MG/DL
LDLC SERPL CALC-MCNC: 54 MG/DL
NONHDLC SERPL-MCNC: 97 MG/DL
POTASSIUM SERPL-SCNC: 4 MMOL/L (ref 3.4–5.3)
PROT SERPL-MCNC: 7.3 G/DL (ref 6.4–8.3)
PSA SERPL DL<=0.01 NG/ML-MCNC: 0.6 NG/ML (ref 0–4.5)
SODIUM SERPL-SCNC: 139 MMOL/L (ref 136–145)
TRIGL SERPL-MCNC: 217 MG/DL

## 2023-10-19 ENCOUNTER — E-VISIT (OUTPATIENT)
Dept: URGENT CARE | Facility: CLINIC | Age: 68
End: 2023-10-19
Payer: COMMERCIAL

## 2023-10-19 DIAGNOSIS — J06.9 VIRAL URI WITH COUGH: Primary | ICD-10-CM

## 2023-10-19 PROCEDURE — 99421 OL DIG E/M SVC 5-10 MIN: CPT | Performed by: NURSE PRACTITIONER

## 2023-10-19 RX ORDER — GUAIFENESIN 1200 MG/1
1200 TABLET, EXTENDED RELEASE ORAL 2 TIMES DAILY
Qty: 60 TABLET | Refills: 0 | Status: SHIPPED | OUTPATIENT
Start: 2023-10-19 | End: 2024-08-01

## 2023-10-19 NOTE — PATIENT INSTRUCTIONS
Viral Respiratory Infection: Care Instructions  Overview     A viral respiratory infection is an infection of the nose, sinuses, or throat caused by a virus. Colds and the flu are common types of viral respiratory infections.  The symptoms of a viral respiratory infection often start quickly. They include a fever, sore throat, and runny nose. You may also just not feel well. Or you may not want to eat much.  Most viral infections can be treated with home care. This may include drinking lots of fluids and taking over-the-counter pain medicine. You will probably feel better in 4 to 10 days.  Antibiotics are not used to treat a viral infection. Antibiotics don't kill viruses, so they won't help cure a viral illness.  In some cases, a doctor may prescribe antiviral medicine to help your body fight a serious viral infection.  Follow-up care is a key part of your treatment and safety. Be sure to make and go to all appointments, and call your doctor if you are having problems. It's also a good idea to know your test results and keep a list of the medicines you take.  How can you care for yourself at home?  To prevent dehydration, drink plenty of fluids. Choose water and other clear liquids until you feel better. If you have kidney, heart, or liver disease and have to limit fluids, talk with your doctor before you increase the amount of fluids you drink.  Ask your doctor if you can take an over-the-counter pain medicine, such as acetaminophen (Tylenol), ibuprofen (Advil, Motrin), or naproxen (Aleve). Be safe with medicines. Read and follow all instructions on the label. No one younger than 20 should take aspirin. It has been linked to Reye syndrome, a serious illness.  Be careful when taking over-the-counter cold or flu medicines and Tylenol at the same time. Many of these medicines have acetaminophen, which is Tylenol. Read the labels to make sure that you are not taking more than the recommended dose. Too much  "acetaminophen (Tylenol) can be harmful.  Get plenty of rest.  Use saline (saltwater) nasal washes to help keep your nasal passages open and wash out mucus and allergens. You can buy saline nose sprays at a grocery store or drugstore. Follow the instructions on the package. Or you can make your own at home. Add 1 teaspoon of non-iodized salt and 1 teaspoon of baking soda to 2 cups of distilled or boiled and cooled water. Fill a squeeze bottle or neti pot with the nasal wash. Then put the tip into your nostril, and lean over the sink. With your mouth open, gently squirt the liquid. Repeat on the other side.  Use a vaporizer or humidifier to add moisture to your bedroom. Follow the instructions for cleaning the machine.  Do not smoke or allow others to smoke around you. If you need help quitting, talk to your doctor about stop-smoking programs and medicines. These can increase your chances of quitting for good.  When should you call for help?   Call 911 anytime you think you may need emergency care. For example, call if:    You have severe trouble breathing.   Call your doctor now or seek immediate medical care if:    You have a new or higher fever.     Your fever lasts more than 48 hours.     You have trouble breathing.     You have a fever with a stiff neck or a severe headache.     You are sensitive to light.     You feel very sleepy or confused.   Watch closely for changes in your health, and be sure to contact your doctor if:    You do not get better as expected.   Where can you learn more?  Go to https://www.[employer].net/patiented  Enter Q795 in the search box to learn more about \"Viral Respiratory Infection: Care Instructions.\"  Current as of: October 31, 2022               Content Version: 13.7    2819-2203 [employer].   Care instructions adapted under license by your healthcare professional. If you have questions about a medical condition or this instruction, always ask your healthcare " professional. [employer] disclaims any warranty or liability for your use of this information.      Thank you for choosing us for your care. I have placed an order for Mucinex so that you can start treatment on your symptoms. This sounds like a viral upper respiratory illness that does not need antibiotics at this time. View your full visit summary for details by clicking on the link below. Your pharmacist will able to address any questions you may have about the medication.     If you're not feeling better within 5-7 days, please schedule an appointment.  You can schedule an appointment right here in [facility], or call [phone]  If the visit is for the same symptoms as your eVisit, we'll refund the cost of your eVisit if seen within seven days.

## 2023-10-28 ENCOUNTER — HEALTH MAINTENANCE LETTER (OUTPATIENT)
Age: 68
End: 2023-10-28

## 2023-11-01 ENCOUNTER — IMMUNIZATION (OUTPATIENT)
Dept: NURSING | Facility: CLINIC | Age: 68
End: 2023-11-01
Payer: COMMERCIAL

## 2023-11-01 PROCEDURE — G0008 ADMIN INFLUENZA VIRUS VAC: HCPCS

## 2023-11-01 PROCEDURE — 91320 SARSCV2 VAC 30MCG TRS-SUC IM: CPT

## 2023-11-01 PROCEDURE — 90480 ADMN SARSCOV2 VAC 1/ONLY CMP: CPT

## 2023-11-01 PROCEDURE — 90662 IIV NO PRSV INCREASED AG IM: CPT

## 2023-11-10 ENCOUNTER — TRANSFERRED RECORDS (OUTPATIENT)
Dept: HEALTH INFORMATION MANAGEMENT | Facility: CLINIC | Age: 68
End: 2023-11-10

## 2024-03-08 ENCOUNTER — MYC MEDICAL ADVICE (OUTPATIENT)
Dept: FAMILY MEDICINE | Facility: CLINIC | Age: 69
End: 2024-03-08
Payer: COMMERCIAL

## 2024-03-08 DIAGNOSIS — I10 HYPERTENSION GOAL BP (BLOOD PRESSURE) < 140/90: ICD-10-CM

## 2024-03-08 RX ORDER — HYDROCHLOROTHIAZIDE 25 MG/1
25 TABLET ORAL DAILY
Qty: 90 TABLET | Refills: 3 | Status: SHIPPED | OUTPATIENT
Start: 2024-03-08

## 2024-03-08 RX ORDER — HYDROCHLOROTHIAZIDE 25 MG/1
25 TABLET ORAL DAILY
Qty: 100 TABLET | Refills: 2 | OUTPATIENT
Start: 2024-03-08

## 2024-05-12 DIAGNOSIS — E78.5 HYPERLIPIDEMIA LDL GOAL <160: ICD-10-CM

## 2024-05-12 RX ORDER — ATORVASTATIN CALCIUM 40 MG/1
40 TABLET, FILM COATED ORAL DAILY
Qty: 100 TABLET | Refills: 0 | Status: SHIPPED | OUTPATIENT
Start: 2024-05-12 | End: 2024-07-22

## 2024-05-17 ENCOUNTER — IMMUNIZATION (OUTPATIENT)
Dept: FAMILY MEDICINE | Facility: CLINIC | Age: 69
End: 2024-05-17
Payer: COMMERCIAL

## 2024-05-17 DIAGNOSIS — Z23 ENCOUNTER FOR IMMUNIZATION: Primary | ICD-10-CM

## 2024-05-17 PROCEDURE — 99207 PR NO CHARGE NURSE ONLY: CPT

## 2024-05-17 PROCEDURE — 91320 SARSCV2 VAC 30MCG TRS-SUC IM: CPT

## 2024-05-17 PROCEDURE — 90480 ADMN SARSCOV2 VAC 1/ONLY CMP: CPT

## 2024-05-17 NOTE — PROGRESS NOTES
Prior to immunization administration, verified patients identity using patient s name and date of birth. Please see Immunization Activity for additional information.     Screening Questionnaire for Adult Immunization    Are you sick today?   No   Do you have allergies to medications, food, a vaccine component or latex?   No   Have you ever had a serious reaction after receiving a vaccination?   No   Do you have a long-term health problem with heart, lung, kidney, or metabolic disease (e.g., diabetes), asthma, a blood disorder, no spleen, complement component deficiency, a cochlear implant, or a spinal fluid leak?  Are you on long-term aspirin therapy?   No   Do you have cancer, leukemia, HIV/AIDS, or any other immune system problem?   No   Do you have a parent, brother, or sister with an immune system problem?   No   In the past 3 months, have you taken medications that affect  your immune system, such as prednisone, other steroids, or anticancer drugs; drugs for the treatment of rheumatoid arthritis, Crohn s disease, or psoriasis; or have you had radiation treatments?   No   Have you had a seizure, or a brain or other nervous system problem?   No   During the past year, have you received a transfusion of blood or blood    products, or been given immune (gamma) globulin or antiviral drug?   No   For women: Are you pregnant or is there a chance you could become       pregnant during the next month?   No   Have you received any vaccinations in the past 4 weeks?   No     Immunization questionnaire answers were all negative.    I have reviewed the following standing orders:   This patient is due and qualifies for the Covid-19 vaccine.     Click here for COVID-19 Standing Order    I have reviewed the vaccines inclusion and exclusion criteria; No concerns regarding eligibility.     Patient instructed to remain in clinic for 15 minutes afterwards, and to report any adverse reactions.     Screening performed by Leesa Orozco  CMA on 5/17/2024 at 8:22 AM.

## 2024-05-25 ENCOUNTER — HEALTH MAINTENANCE LETTER (OUTPATIENT)
Age: 69
End: 2024-05-25

## 2024-06-11 DIAGNOSIS — G89.29 CHRONIC LOW BACK PAIN WITHOUT SCIATICA, UNSPECIFIED BACK PAIN LATERALITY: Primary | ICD-10-CM

## 2024-06-11 DIAGNOSIS — M54.50 CHRONIC LOW BACK PAIN WITHOUT SCIATICA, UNSPECIFIED BACK PAIN LATERALITY: Primary | ICD-10-CM

## 2024-06-13 RX ORDER — CELECOXIB 200 MG/1
CAPSULE ORAL
Qty: 100 CAPSULE | Refills: 0 | Status: SHIPPED | OUTPATIENT
Start: 2024-06-13 | End: 2024-08-25

## 2024-06-26 ENCOUNTER — TRANSFERRED RECORDS (OUTPATIENT)
Dept: HEALTH INFORMATION MANAGEMENT | Facility: CLINIC | Age: 69
End: 2024-06-26

## 2024-07-21 DIAGNOSIS — E78.5 HYPERLIPIDEMIA LDL GOAL <160: ICD-10-CM

## 2024-07-22 RX ORDER — ATORVASTATIN CALCIUM 40 MG/1
40 TABLET, FILM COATED ORAL DAILY
Qty: 100 TABLET | Refills: 0 | Status: SHIPPED | OUTPATIENT
Start: 2024-07-22 | End: 2024-09-30

## 2024-07-27 SDOH — HEALTH STABILITY: PHYSICAL HEALTH: ON AVERAGE, HOW MANY DAYS PER WEEK DO YOU ENGAGE IN MODERATE TO STRENUOUS EXERCISE (LIKE A BRISK WALK)?: 5 DAYS

## 2024-07-27 SDOH — HEALTH STABILITY: PHYSICAL HEALTH: ON AVERAGE, HOW MANY MINUTES DO YOU ENGAGE IN EXERCISE AT THIS LEVEL?: 40 MIN

## 2024-07-27 ASSESSMENT — SOCIAL DETERMINANTS OF HEALTH (SDOH): HOW OFTEN DO YOU GET TOGETHER WITH FRIENDS OR RELATIVES?: ONCE A WEEK

## 2024-07-29 ENCOUNTER — PATIENT OUTREACH (OUTPATIENT)
Dept: CARE COORDINATION | Facility: CLINIC | Age: 69
End: 2024-07-29
Payer: COMMERCIAL

## 2024-07-31 NOTE — PATIENT INSTRUCTIONS
Patient Education   Preventive Care Advice   This is general advice given by our system to help you stay healthy. However, your care team may have specific advice just for you. Please talk to your care team about your preventive care needs.  Nutrition  Eat 5 or more servings of fruits and vegetables each day.  Try wheat bread, brown rice and whole grain pasta (instead of white bread, rice, and pasta).  Get enough calcium and vitamin D. Check the label on foods and aim for 100% of the RDA (recommended daily allowance).  Lifestyle  Exercise at least 150 minutes each week  (30 minutes a day, 5 days a week).  Do muscle strengthening activities 2 days a week. These help control your weight and prevent disease.  No smoking.  Wear sunscreen to prevent skin cancer.  Have a dental exam and cleaning every 6 months.  Yearly exams  See your health care team every year to talk about:  Any changes in your health.  Any medicines your care team has prescribed.  Preventive care, family planning, and ways to prevent chronic diseases.  Shots (vaccines)   HPV shots (up to age 26), if you've never had them before.  Hepatitis B shots (up to age 59), if you've never had them before.  COVID-19 shot: Get this shot when it's due.  Flu shot: Get a flu shot every year.  Tetanus shot: Get a tetanus shot every 10 years.  Pneumococcal, hepatitis A, and RSV shots: Ask your care team if you need these based on your risk.  Shingles shot (for age 50 and up)  General health tests  Diabetes screening:  Starting at age 35, Get screened for diabetes at least every 3 years.  If you are younger than age 35, ask your care team if you should be screened for diabetes.  Cholesterol test: At age 39, start having a cholesterol test every 5 years, or more often if advised.  Bone density scan (DEXA): At age 50, ask your care team if you should have this scan for osteoporosis (brittle bones).  Hepatitis C: Get tested at least once in your life.  STIs (sexually  transmitted infections)  Before age 24: Ask your care team if you should be screened for STIs.  After age 24: Get screened for STIs if you're at risk. You are at risk for STIs (including HIV) if:  You are sexually active with more than one person.  You don't use condoms every time.  You or a partner was diagnosed with a sexually transmitted infection.  If you are at risk for HIV, ask about PrEP medicine to prevent HIV.  Get tested for HIV at least once in your life, whether you are at risk for HIV or not.  Cancer screening tests  Cervical cancer screening: If you have a cervix, begin getting regular cervical cancer screening tests starting at age 21.  Breast cancer scan (mammogram): If you've ever had breasts, begin having regular mammograms starting at age 40. This is a scan to check for breast cancer.  Colon cancer screening: It is important to start screening for colon cancer at age 45.  Have a colonoscopy test every 10 years (or more often if you're at risk) Or, ask your provider about stool tests like a FIT test every year or Cologuard test every 3 years.  To learn more about your testing options, visit:   .  For help making a decision, visit:   https://bit.ly/ra13985.  Prostate cancer screening test: If you have a prostate, ask your care team if a prostate cancer screening test (PSA) at age 55 is right for you.  Lung cancer screening: If you are a current or former smoker ages 50 to 80, ask your care team if ongoing lung cancer screenings are right for you.  For informational purposes only. Not to replace the advice of your health care provider. Copyright   2023 [employer]. All rights reserved. Clinically reviewed by the [facility]. [employer] 726988 - REV 01/24.

## 2024-08-01 ENCOUNTER — OFFICE VISIT (OUTPATIENT)
Dept: FAMILY MEDICINE | Facility: CLINIC | Age: 69
End: 2024-08-01
Attending: NURSE PRACTITIONER
Payer: COMMERCIAL

## 2024-08-01 VITALS
WEIGHT: 228.6 LBS | OXYGEN SATURATION: 96 % | DIASTOLIC BLOOD PRESSURE: 78 MMHG | RESPIRATION RATE: 22 BRPM | SYSTOLIC BLOOD PRESSURE: 124 MMHG | HEIGHT: 69 IN | BODY MASS INDEX: 33.86 KG/M2 | TEMPERATURE: 97.8 F | HEART RATE: 94 BPM

## 2024-08-01 DIAGNOSIS — I10 HYPERTENSION GOAL BP (BLOOD PRESSURE) < 140/90: ICD-10-CM

## 2024-08-01 DIAGNOSIS — Z00.00 ENCOUNTER FOR MEDICARE ANNUAL WELLNESS EXAM: Primary | ICD-10-CM

## 2024-08-01 DIAGNOSIS — E66.09 CLASS 1 OBESITY DUE TO EXCESS CALORIES WITH SERIOUS COMORBIDITY AND BODY MASS INDEX (BMI) OF 33.0 TO 33.9 IN ADULT: ICD-10-CM

## 2024-08-01 DIAGNOSIS — E78.5 HYPERLIPIDEMIA LDL GOAL <160: ICD-10-CM

## 2024-08-01 DIAGNOSIS — R73.9 ELEVATED SERUM GLUCOSE: ICD-10-CM

## 2024-08-01 DIAGNOSIS — E66.811 CLASS 1 OBESITY DUE TO EXCESS CALORIES WITH SERIOUS COMORBIDITY AND BODY MASS INDEX (BMI) OF 33.0 TO 33.9 IN ADULT: ICD-10-CM

## 2024-08-01 DIAGNOSIS — Z12.5 ENCOUNTER FOR SCREENING FOR MALIGNANT NEOPLASM OF PROSTATE: ICD-10-CM

## 2024-08-01 DIAGNOSIS — R73.03 PREDIABETES: ICD-10-CM

## 2024-08-01 LAB
ERYTHROCYTE [DISTWIDTH] IN BLOOD BY AUTOMATED COUNT: 13.4 % (ref 10–15)
HBA1C MFR BLD: 6.2 % (ref 0–5.6)
HCT VFR BLD AUTO: 47.1 % (ref 40–53)
HGB BLD-MCNC: 15.4 G/DL (ref 13.3–17.7)
MCH RBC QN AUTO: 29.8 PG (ref 26.5–33)
MCHC RBC AUTO-ENTMCNC: 32.7 G/DL (ref 31.5–36.5)
MCV RBC AUTO: 91 FL (ref 78–100)
PLATELET # BLD AUTO: 212 10E3/UL (ref 150–450)
RBC # BLD AUTO: 5.17 10E6/UL (ref 4.4–5.9)
WBC # BLD AUTO: 6.7 10E3/UL (ref 4–11)

## 2024-08-01 PROCEDURE — 83036 HEMOGLOBIN GLYCOSYLATED A1C: CPT | Performed by: NURSE PRACTITIONER

## 2024-08-01 PROCEDURE — 36415 COLL VENOUS BLD VENIPUNCTURE: CPT | Performed by: NURSE PRACTITIONER

## 2024-08-01 PROCEDURE — 80061 LIPID PANEL: CPT | Performed by: NURSE PRACTITIONER

## 2024-08-01 PROCEDURE — 99214 OFFICE O/P EST MOD 30 MIN: CPT | Mod: 25 | Performed by: NURSE PRACTITIONER

## 2024-08-01 PROCEDURE — 85027 COMPLETE CBC AUTOMATED: CPT | Performed by: NURSE PRACTITIONER

## 2024-08-01 PROCEDURE — G0439 PPPS, SUBSEQ VISIT: HCPCS | Performed by: NURSE PRACTITIONER

## 2024-08-01 PROCEDURE — G0103 PSA SCREENING: HCPCS | Performed by: NURSE PRACTITIONER

## 2024-08-01 PROCEDURE — 80053 COMPREHEN METABOLIC PANEL: CPT | Performed by: NURSE PRACTITIONER

## 2024-08-01 ASSESSMENT — PAIN SCALES - GENERAL: PAINLEVEL: NO PAIN (0)

## 2024-08-01 NOTE — PROGRESS NOTES
Called patient to convey message Below no answer sent a e-mail through my hossein to callback also. Patient did not answer left voicemail to call back at [phone].      Preventive Care Visit  [facility]  Madai Stevens, NP, Nurse Practitioner - Family  Aug 1, 2024      Assessment & Plan     Encounter for Medicare annual wellness exam  - PSA, screen  - CBC with platelets  - Comprehensive metabolic panel (BMP + Alb, Alk Phos, ALT, AST, Total. Bili, TP)    Class 1 obesity due to excess calories with serious comorbidity and body mass index (BMI) of 33.0 to 33.9 in adult  Discussed with patient the indication and use of medication(s), risks/benefits, and potential adverse side effects.  Patient/guardian verbalized understanding and agreement with the plan.   Patient interested in weight management referral and interested in starting Wegovy for weight loss.  He has comorbid conditions of hypertension, hyperlipidemia, and prediabetes that could also improve with weight loss treatment.    - HEMOGLOBIN A1C; Future  - Adult Comprehensive Weight Management  Referral; Future  - HEMOGLOBIN A1C  - Semaglutide-Weight Management (WEGOVY) 0.25 MG/0.5ML pen; Inject 0.25 mg subcutaneously once a week for 4 doses For weeks 1-4  - Semaglutide-Weight Management (WEGOVY) 0.5 MG/0.5ML pen; Inject 0.5 mg subcutaneously once a week for 4 doses For weeks 5-8  - Semaglutide-Weight Management (WEGOVY) 1 MG/0.5ML pen; Inject 1 mg subcutaneously once a week For weeks 9 and beyond  - Follow-up in 3 months for recheck    Hypertension goal BP (blood pressure) < 140/90  Chronic, stable, continue current treatment.   - Comprehensive metabolic panel (BMP + Alb, Alk Phos, ALT, AST, Total. Bili, TP); Future  - Comprehensive metabolic panel (BMP + Alb, Alk Phos, ALT, AST, Total. Bili, TP)    Hyperlipidemia LDL goal <160  Chronic, stable, continue current treatment.   - Lipid panel reflex to direct LDL Non-fasting; Future  - Lipid panel reflex to direct LDL Non-fasting    Elevated serum glucose  monitor  - HEMOGLOBIN A1C; Future  - HEMOGLOBIN A1C    Prediabetes  monitor    Encounter  "for screening for malignant neoplasm of prostate    - PSA, screen; Future  - PSA, screen          BMI  Estimated body mass index is 33.76 kg/m  as calculated from the following:    Height as of this encounter: 1.753 m (5' 9\").    Weight as of this encounter: 103.7 kg (228 lb 9.6 oz).   Weight management plan: Discussed healthy diet and exercise guidelines Patient has [employer] plan and was referred to the [facility] GLP-1 Weight Loss RX Criteria    Counseling  Appropriate preventive services were addressed with this patient via screening, questionnaire, or discussion as appropriate for fall prevention, nutrition, physical activity, Tobacco-use cessation, weight loss and cognition.  Checklist reviewing preventive services available has been given to the patient.  Reviewed patient's diet, addressing concerns and/or questions.   He is at risk for psychosocial distress and has been provided with information to reduce risk.         Sin Enriquez is a 68 year old, presenting for the following:  Wellness Visit (Fasting )        8/1/2024    10:19 AM   Additional Questions   Roomed by Haylie CESAR         Health Care Directive  Patient has a Health Care Directive on file  Advance care planning document is on file and is current.    HPI    Wife Poly    Hyperlipidemia Follow-Up    Are you regularly taking any medication or supplement to lower your cholesterol?   Yes  Are you having muscle aches or other side effects that you think could be caused by your cholesterol lowering medication?  No    Hypertension Follow-up        7/27/2024   General Health   How would you rate your overall physical health? Good   Feel stress (tense, anxious, or unable to sleep) Only a little      (!) STRESS CONCERN      7/27/2024   Nutrition   Diet: Regular (no restrictions)            7/27/2024   Exercise   Days per week of moderate/strenous exercise 5 days   Average minutes spent exercising at this level 40 " min            7/27/2024   Social Factors   Frequency of gathering with friends or relatives Once a week   Worry food won't last until get money to buy more No   Food not last or not have enough money for food? No   Do you have housing? (Housing is defined as stable permanent housing and does not include staying ouside in a car, in a tent, in an abandoned building, in an overnight shelter, or couch-surfing.) Yes   Are you worried about losing your housing? No   Lack of transportation? No   Unable to get utilities (heat,electricity)? No            7/27/2024   Fall Risk   Fallen 2 or more times in the past year? No    No   Trouble with walking or balance? No    No       Multiple values from one day are sorted in reverse-chronological order          7/27/2024   Activities of Daily Living- Home Safety   Needs help with the following daily activites None of the above   Safety concerns in the home None of the above            7/27/2024   Dental   Dentist two times every year? Yes            7/27/2024   Hearing Screening   Hearing concerns? None of the above            7/27/2024   Driving Risk Screening   Patient/family members have concerns about driving No            7/27/2024   General Alertness/Fatigue Screening   Have you been more tired than usual lately? No            7/27/2024   Urinary Incontinence Screening   Bothered by leaking urine in past 6 months No            7/27/2024   TB Screening   Were you born outside of the [place]? No            Today's PHQ-2 Score:       8/1/2024    10:06 AM   PHQ-2 ( 1999 [employer])   Q1: Little interest or pleasure in doing things 0   Q2: Feeling down, depressed or hopeless 0   PHQ-2 Score 0   Q1: Little interest or pleasure in doing things Not at all   Q2: Feeling down, depressed or hopeless Not at all   PHQ-2 Score 0           7/27/2024   Substance Use   Alcohol more than 3/day or more than 7/wk Not Applicable   Do you have a current opioid prescription? No   How severe/bad is pain  from 1 to 10? 1/10   Do you use any other substances recreationally? No        Social History     Tobacco Use    Smoking status: Never    Smokeless tobacco: Never   Vaping Use    Vaping status: Never Used   Substance Use Topics    Alcohol use: No    Drug use: No           7/27/2024   AAA Screening   Family history of Abdominal Aortic Aneurysm (AAA)? No      Last PSA:   PSA   Date Value Ref Range Status   02/02/2021 0.84 0 - 4 ug/L Final     Comment:     Assay Method:  Chemiluminescence using Siemens Vista analyzer     Prostate Specific Antigen Screen   Date Value Ref Range Status   07/21/2023 0.60 0.00 - 4.50 ng/mL Final     ASCVD Risk   The 10-year ASCVD risk score (Juan COFFMAN, et al., 2019) is: 13.8%    Values used to calculate the score:      Age: 68 years      Sex: Male      Is Non- : No      Diabetic: No      Tobacco smoker: No      Systolic Blood Pressure: 124 mmHg      Is BP treated: Yes      HDL Cholesterol: 46 mg/dL      Total Cholesterol: 143 mg/dL            Reviewed and updated as needed this visit by Provider     Meds   Med Hx  Surg Hx  Fam Hx            BP Readings from Last 3 Encounters:   08/01/24 124/78   07/21/23 126/60   03/29/23 (!) 143/89    Wt Readings from Last 3 Encounters:   08/01/24 103.7 kg (228 lb 9.6 oz)   07/21/23 104.1 kg (229 lb 9.6 oz)   03/29/23 104.7 kg (230 lb 12.8 oz)                  Patient Active Problem List   Diagnosis    DJD (degenerative joint disease) of knee    HYPERLIPIDEMIA LDL GOAL <160    Class 1 obesity due to excess calories with serious comorbidity and body mass index (BMI) of 33.0 to 33.9 in adult    Hypertension goal BP (blood pressure) < 140/90    Elevated serum glucose    Prediabetes     Past Surgical History:   Procedure Laterality Date    COLONOSCOPY  2016    SURGICAL HISTORY OF -   1995    wrist    ZZC APPENDECTOMY  1971       Social History     Tobacco Use    Smoking status: Never    Smokeless tobacco: Never   Substance  Use Topics    Alcohol use: No     Family History   Problem Relation Age of Onset    Hypertension Mother     Heart Disease Father     Hypertension Father     Lipids Father     Depression Father     Coronary Artery Disease Father         Bypass surgery - 3          Current Outpatient Medications   Medication Sig Dispense Refill    atorvastatin (LIPITOR) 40 MG tablet TAKE 1 TABLET BY MOUTH DAILY 100 tablet 0    celecoxib (CELEBREX) 200 MG capsule TAKE 1 CAPSULE BY MOUTH DAILY 100 capsule 0    hydrochlorothiazide (HYDRODIURIL) 25 MG tablet Take 1 tablet (25 mg) by mouth daily 90 tablet 3    Multiple Vitamin (MULTI VITAMIN  MENS) TABS       omeprazole 20 MG tablet Take 20 mg by mouth daily                           aspirin 81 MG tablet Take 81 mg by mouth daily       Allergies   Allergen Reactions    Nkda [No Known Drug Allergy]      Current providers sharing in care for this patient include:  Patient Care Team:  Madai Stevens NP as PCP - General  Madai Stevens NP as Assigned PCP    The following health maintenance items are reviewed in Epic and correct as of today:  Health Maintenance   Topic Date Due    BMP  07/21/2024    LIPID  07/21/2024    INFLUENZA VACCINE (1) 09/01/2024    A1C  02/01/2025    MEDICARE ANNUAL WELLNESS VISIT  08/01/2025    ANNUAL REVIEW OF HM ORDERS  08/01/2025    FALL RISK ASSESSMENT  08/01/2025    DTAP/TDAP/TD IMMUNIZATION (2 - Td or Tdap) 06/28/2026    GLUCOSE  07/21/2026    COLORECTAL CANCER SCREENING  07/26/2026    ADVANCE CARE PLANNING  08/01/2029    HEPATITIS C SCREENING  Completed    PHQ-2 (once per calendar year)  Completed    Pneumococcal Vaccine: 65+ Years  Completed    ZOSTER IMMUNIZATION  Completed    RSV VACCINE (Pregnancy & 60+)  Completed    COVID-19 Vaccine  Completed    IPV IMMUNIZATION  Aged Out    HPV IMMUNIZATION  Aged Out    MENINGITIS IMMUNIZATION  Aged Out    RSV MONOCLONAL ANTIBODY  Aged Out          Objective    Exam  /78 (BP Location: Right arm, Patient  "Position: Sitting, Cuff Size: Adult Large)   Pulse 94   Temp 97.8  F (36.6  C) (Oral)   Resp 22   Ht 1.753 m (5' 9\")   Wt 103.7 kg (228 lb 9.6 oz)   SpO2 96%   BMI 33.76 kg/m     Estimated body mass index is 33.76 kg/m  as calculated from the following:    Height as of this encounter: 1.753 m (5' 9\").    Weight as of this encounter: 103.7 kg (228 lb 9.6 oz).    Physical Exam  GENERAL: healthy, alert, no distress, and obese  EYES: Eyes grossly normal to inspection, PERRL and conjunctivae and sclerae normal  HENT: ear canals and TM's normal, nose and mouth without ulcers or lesions  NECK: no adenopathy, no asymmetry, masses, or scars  RESP: lungs clear to auscultation - no rales, rhonchi or wheezes  CV: regular rate and rhythm, normal S1 S2, no S3 or S4, no murmur, click or rub, no peripheral edema  SKIN: no suspicious lesions or rashes  NEURO: Normal strength and tone, mentation intact and speech normal  PSYCH: mentation appears normal, affect normal/bright        8/1/2024   Mini Cog   Clock Draw Score 2 Normal   3 Item Recall 3 objects recalled   Mini Cog Total Score 5                 Signed Electronically by: Madai Stevens NP    "

## 2024-08-02 LAB
ALBUMIN SERPL BCG-MCNC: 4.3 G/DL (ref 3.5–5.2)
ALP SERPL-CCNC: 112 U/L (ref 40–150)
ALT SERPL W P-5'-P-CCNC: 24 U/L (ref 0–70)
ANION GAP SERPL CALCULATED.3IONS-SCNC: 11 MMOL/L (ref 7–15)
AST SERPL W P-5'-P-CCNC: 31 U/L (ref 0–45)
BILIRUB SERPL-MCNC: 0.8 MG/DL
BUN SERPL-MCNC: 24.6 MG/DL (ref 8–23)
CALCIUM SERPL-MCNC: 9.1 MG/DL (ref 8.8–10.4)
CHLORIDE SERPL-SCNC: 102 MMOL/L (ref 98–107)
CHOLEST SERPL-MCNC: 138 MG/DL
CREAT SERPL-MCNC: 1.25 MG/DL (ref 0.67–1.17)
EGFRCR SERPLBLD CKD-EPI 2021: 63 ML/MIN/1.73M2
FASTING STATUS PATIENT QL REPORTED: YES
FASTING STATUS PATIENT QL REPORTED: YES
GLUCOSE SERPL-MCNC: 141 MG/DL (ref 70–99)
HCO3 SERPL-SCNC: 26 MMOL/L (ref 22–29)
HDLC SERPL-MCNC: 50 MG/DL
LDLC SERPL CALC-MCNC: 57 MG/DL
NONHDLC SERPL-MCNC: 88 MG/DL
POTASSIUM SERPL-SCNC: 4.2 MMOL/L (ref 3.4–5.3)
PROT SERPL-MCNC: 6.9 G/DL (ref 6.4–8.3)
PSA SERPL DL<=0.01 NG/ML-MCNC: 0.76 NG/ML (ref 0–4.5)
SODIUM SERPL-SCNC: 139 MMOL/L (ref 135–145)
TRIGL SERPL-MCNC: 155 MG/DL

## 2024-08-20 DIAGNOSIS — G89.29 CHRONIC LOW BACK PAIN WITHOUT SCIATICA, UNSPECIFIED BACK PAIN LATERALITY: ICD-10-CM

## 2024-08-20 DIAGNOSIS — M54.50 CHRONIC LOW BACK PAIN WITHOUT SCIATICA, UNSPECIFIED BACK PAIN LATERALITY: ICD-10-CM

## 2024-08-25 RX ORDER — CELECOXIB 200 MG/1
CAPSULE ORAL
Qty: 100 CAPSULE | Refills: 2 | Status: SHIPPED | OUTPATIENT
Start: 2024-08-25

## 2024-09-29 DIAGNOSIS — E78.5 HYPERLIPIDEMIA LDL GOAL <160: ICD-10-CM

## 2024-09-30 RX ORDER — ATORVASTATIN CALCIUM 40 MG/1
40 TABLET, FILM COATED ORAL DAILY
Qty: 100 TABLET | Refills: 0 | Status: SHIPPED | OUTPATIENT
Start: 2024-09-30

## 2024-11-08 ENCOUNTER — IMMUNIZATION (OUTPATIENT)
Dept: FAMILY MEDICINE | Facility: CLINIC | Age: 69
End: 2024-11-08
Payer: COMMERCIAL

## 2024-11-08 DIAGNOSIS — Z23 HIGH PRIORITY FOR 2019-NCOV VACCINE: ICD-10-CM

## 2024-11-08 DIAGNOSIS — Z23 NEED FOR PROPHYLACTIC VACCINATION AND INOCULATION AGAINST INFLUENZA: Primary | ICD-10-CM

## 2024-11-08 PROCEDURE — 90480 ADMN SARSCOV2 VAC 1/ONLY CMP: CPT

## 2024-11-08 PROCEDURE — 90662 IIV NO PRSV INCREASED AG IM: CPT

## 2024-11-08 PROCEDURE — 91320 SARSCV2 VAC 30MCG TRS-SUC IM: CPT

## 2024-11-08 PROCEDURE — G0008 ADMIN INFLUENZA VIRUS VAC: HCPCS

## 2024-11-08 NOTE — PROGRESS NOTES
Prior to immunization administration, verified patients identity using patient s name and date of birth. Please see Immunization Activity for additional information.     Screening Questionnaire for Adult Immunization    Are you sick today?   No   Do you have allergies to medications, food, a vaccine component or latex?   No   Have you ever had a serious reaction after receiving a vaccination?   No   Do you have a long-term health problem with heart, lung, kidney, or metabolic disease (e.g., diabetes), asthma, a blood disorder, no spleen, complement component deficiency, a cochlear implant, or a spinal fluid leak?  Are you on long-term aspirin therapy?   No   Do you have cancer, leukemia, HIV/AIDS, or any other immune system problem?   No   Do you have a parent, brother, or sister with an immune system problem?   No   In the past 3 months, have you taken medications that affect  your immune system, such as prednisone, other steroids, or anticancer drugs; drugs for the treatment of rheumatoid arthritis, Crohn s disease, or psoriasis; or have you had radiation treatments?   No   Have you had a seizure, or a brain or other nervous system problem?   No   During the past year, have you received a transfusion of blood or blood    products, or been given immune (gamma) globulin or antiviral drug?   No   For women: Are you pregnant or is there a chance you could become       pregnant during the next month?   No   Have you received any vaccinations in the past 4 weeks?   No     Immunization questionnaire answers were all negative.    I have reviewed the following standing orders:   This patient is due and qualifies for the Covid-19 vaccine.     Click here for COVID-19 Standing Order    I have reviewed the vaccines inclusion and exclusion criteria; No concerns regarding eligibility.     This patient is due and qualifies for the Influenza vaccine.    Click here for Influenza Vaccine Standing Order    I have reviewed the  vaccines inclusion and exclusion criteria; No concerns regarding eligibility.     Patient instructed to remain in clinic for 15 minutes afterwards, and to report any adverse reactions.     Screening performed by Asuncion Rawls MA on 11/8/2024 at 9:17 AM.

## 2024-12-07 ASSESSMENT — SLEEP AND FATIGUE QUESTIONNAIRES
HOW LIKELY ARE YOU TO NOD OFF OR FALL ASLEEP WHILE LYING DOWN TO REST IN THE AFTERNOON WHEN CIRCUMSTANCES PERMIT: SLIGHT CHANCE OF DOZING
HOW LIKELY ARE YOU TO NOD OFF OR FALL ASLEEP WHILE SITTING QUIETLY AFTER LUNCH WITHOUT ALCOHOL: WOULD NEVER DOZE
HOW LIKELY ARE YOU TO NOD OFF OR FALL ASLEEP WHILE SITTING AND READING: WOULD NEVER DOZE
HOW LIKELY ARE YOU TO NOD OFF OR FALL ASLEEP WHEN YOU ARE A PASSENGER IN A CAR FOR AN HOUR WITHOUT A BREAK: WOULD NEVER DOZE
HOW LIKELY ARE YOU TO NOD OFF OR FALL ASLEEP IN A CAR, WHILE STOPPED FOR A FEW MINUTES IN TRAFFIC: WOULD NEVER DOZE
HOW LIKELY ARE YOU TO NOD OFF OR FALL ASLEEP WHILE SITTING INACTIVE IN A PUBLIC PLACE: WOULD NEVER DOZE
HOW LIKELY ARE YOU TO NOD OFF OR FALL ASLEEP WHILE WATCHING TV: WOULD NEVER DOZE
HOW LIKELY ARE YOU TO NOD OFF OR FALL ASLEEP WHILE SITTING AND TALKING TO SOMEONE: WOULD NEVER DOZE

## 2024-12-08 DIAGNOSIS — E78.5 HYPERLIPIDEMIA LDL GOAL <160: ICD-10-CM

## 2024-12-08 NOTE — PROGRESS NOTES
"      { E&M time:352480}    New Medical Weight Management Consult    PATIENT:  Mina Ames  MRN:         [id number]  :         1955  RIK:         2024    Dear ***,    I had the pleasure of seeing your patient, Mina Ames. Full intake/assessment was done to determine barriers to weight loss success and develop a treatment plan. Mina Ames is a 69 year old male interested in treatment of medical problems associated with excess weight. He has a height of 5' 9\", a weight of 233 lbs 0 oz, and the calculated Body mass index is 34.41 kg/m .            Assessment & Plan   Problem List Items Addressed This Visit    None       Plan  Start Naltrexone 50mg - take 1/2 tablet daily for 7 days, then increase to 1 tablet daily. If lab levels are consistent with type 2 diabetes, will start ozempic (last fasting glucose 140, last A1c 6.2)  Please talk to your ophthalmologist about topiramate given concerns about glaucoma   Goals we discussed today:   90g protein daily   Explore zero sugar water flavoring- goal of minimum 48oz water daily    Labs ordered today: A1c, pth, vitamin d, cmp   Follow up with Mindi in 3 months   Dietician appointment not covered due to medicare status, Mina will consider canceling this          Anti-obesity medication started today for this patient   {Obesity Med Options (Optional):307415}     Potential anti-obesity medications for this patient the future if there are issues with cost or side effects  {Obesity Med Options (Optional):073121}    The following medications could be considered with caution for this patient   {Obesity Med Options (Optional):234173}     Contraindicated/Failed Weight loss medications for this patient   {Obesity Med Contraindications:227591}          Mina Ames is a 69 year old male who presents to clinic today for the following health issues.     He has the following co-morbidities:        2024     6:40 PM   --   I have the following health " "issues associated with obesity Pre-Diabetes    High Blood Pressure    High Cholesterol    GERD (Reflux)   I have the following symptoms associated with obesity Back Pain            No data to display                    12/7/2024     6:40 PM   Referring Provider   Please name the provider who referred you to Medical Weight Management  If you do not know, please answer \"I Don't Know\" Madai Stevens     Overweight onset as child. In college was able to lose from 198 to 170lbs with increasing physical activity, cutting out sweets. Weight regain with moving to [place] in his 30s, dating his wife. This weight gain has been gradual. Was able to lose 10lbs age 63 with using an exercise bike, but has since seen weight regain despite continuing to exercise.     Highest weight in life is current weight of 233lbs       Comorbidities associated with weight gain include hyperlipidemia, HTN, knee pain, back pain, pre-diabetes, GERD (managed with daily omeprazole).     Motivators for weight loss include feel healthier, improve self esteem.     He is possibly interested in starting a medication as a tool for working towards sustainable weight loss.    Regarding eating patterns and diet, he typically eats 2 meals a day. Doesn't generally get cravings. Is able to get full. Can stay full until next meal. Does struggle with portion control. Does not experience food noise. Does not experience emotional eating. Does not experience a loss of control around eating.    Eats out/ gets take out 3 times a week. Drinks pop a lot of the day- diet mountain dew, 2 cans a day. Milk at lunch, 1%. Milk before bed. Doesn't drink water often due to the lack of flavor. No ETOH. Juice rarely.     Quick meal recall:   B: typically skips, might have [place] toast and eggs on the weekends   L: healthy choice frozen meal or hot dogs and bun + chips (doritos)   D: If working will have granola bar ([place] peanut butter + apple + diet mountain dew during " work, then will have cottage cheese and grapes when he gets home. If not working, will get [employer], taco bell. Neither cooks very often.     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 Works from 2pm to midnight twice a week. Outside of work, spends time at home. Rides his exercise bike 50 min 5x weekly. On days he's not working, will walk 60-70 minutes outside. No weight training- is concerned about doing this due to his back pain. Has also considered joining a gym.       Past AOMs   Wegovy- prescribed by primary care, not covered by medicare           12/7/2024     6:40 PM   Weight History   How concerned are you about your weight? Somewhat Concerned   I became overweight As an Adult   The following factors have contributed to my weight gain Change in Schedule    Eating Wrong Types of Food    Eating Too Much   I have tried the following methods to lose weight Exercise   My lowest weight since age 19 was 170   My highest weight since age 19 was 228   The most weight I have ever lost was (lbs) 28   I have the following family history of obesity/being overweight I am the only one in my immediate family who is overweight   How has your weight changed over the last year? Gained   How many pounds? 5           12/7/2024     6:40 PM   Diet Recall Review with Patient   If you do eat lunch, what types of food do you typically eat? [place] with potato salad or healthy choice with a salad   If you do eat supper, what types of food do you typically eat? Pizza , burgers, pasta   How many glasses of juice do you drink in a typical day? 0   How many of glasses of milk do you drink in a typical day? 1   If you do drink milk, what type? 1%   How many 8oz glasses of sugar containing drinks such as Christ-A[employer]/sweet tea do you drink in a day? 0   How many cans/bottles of sugar pop/soda/tea/sports drinks do you drink in a day? 0   How many cans/bottles of diet  pop/soda/tea or sports drink do you drink in a day? 2   How often do you have a drink of alcohol? Never           12/7/2024     6:40 PM   Eating Habits   Generally, my meals include foods like these bread, pasta, rice, potatoes, corn, crackers, sweet dessert, pop, or juice A Few Times a Week   Generally, my meals include foods like these fried meats, brats, burgers, french fries, pizza, cheese, chips, or ice cream A Few Times a Week   Eat fast food (like McDonalds, [employer], Taco Bell) A Few Times a Week   Eat at a buffet or sit-down restaurant Less Than Weekly   Eat most of my meals in front of the TV or computer A Few Times a Week   Often skip meals, eat at random times, have no regular eating times Never   Rarely sit down for a meal but snack or graze throughout Never   Eat extra snacks between meals Never   Eat most of my food at the end of the day Less Than Weekly   Eat in the middle of the night or wake up at night to eat Once a Week   Eat extra snacks to prevent or correct low blood sugar Never   Eat to prevent acid reflux or stomach pain Never   Worry about not having enough food to eat Never   I eat when I am depressed Never   I eat when I am stressed Never   I eat when I am bored Never   I eat when I am anxious Never   I eat when I am happy or as a reward Never   I feel hungry all the time even if I just have eaten Never   Feeling full is important to me Never   I finish all the food on my plate even if I am already full Never   I can't resist eating delicious food or walk past the good food/smell Once a Week   I eat/snack without noticing that I am eating Never   I eat when I am preparing the meal Never   I eat more than usual when I see others eating Never   I have trouble not eating sweets, ice cream, cookies, or chips if they are around the house Never   I think about food all day Never   What foods, if any, do you crave? Cheese           12/7/2024     6:40 PM   Amount of Food   I feel out of  control when eating Never   I eat a large amount of food, like a loaf of bread, a box of cookies, a pint/quart of ice cream, all at once Never   I eat a large amount of food even when I am not hungry Never   I eat rapidly Never   I eat alone because I feel embarrassed and do not want others to see how much I have eaten Never   I eat until I am uncomfortably full Never   I feel bad, disgusted, or guilty after I overeat Never           12/7/2024     6:40 PM   Activity/Exercise History   How much of a typical 12 hour day do you spend sitting? Most of the Day   How much of a typical 12 hour day do you spend lying down? Less Than Half the Day   How much of a typical day do you spend walking/standing? Less Than Half the Day   How many hours (not including work) do you spend on the TV/Video Games/Computer/Tablet/Phone? 2-3 Hours   How many times a week are you active for the purpose of exercise? 4-5 TImes a Week   What keeps you from being more active? Lack of Time   How many total minutes do you spend doing some activity for the purpose of exercising when you exercise? More Than 30 Minutes       PAST MEDICAL HISTORY:  Past Medical History:   Diagnosis Date    Adult BMI 30+     Class 1 obesity due to excess calories with serious comorbidity and body mass index (BMI) of 33.0 to 33.9 in adult 07/22/2022    DJD (degenerative joint disease) of knee     xray Oct., 2009    Hyperlipidemia     Hyperlipidemia LDL goal <160 10/31/2010    Hypertension goal BP (blood pressure) < 140/90 07/21/2023    Squamous cell skin cancer 2007    chest           12/7/2024     6:40 PM   Work/Social History Reviewed With Patient   My employment status is Part-Time   My job is    How much of your job is spent on the computer or phone? Less Than 50%   How many hours do you spend commuting to work daily? 1   What is your marital status? /In a Relationship   If in a relationship, is your significant other overweight? Yes   Who do you  "live with? My wife   Who does the food shopping? I do       Marijuana use- none   Alcohol use - none   Caffeine use - 2 can diet mountain dew             12/7/2024     6:40 PM   Mental Health History Reviewed With Patient   Have you ever been physically or sexually abused? No   How often in the past 2 weeks have you felt little interest or pleasure in doing things? Not at all   Over the past 2 weeks how often have you felt down, depressed, or hopeless? Not at all             12/7/2024     6:40 PM   Sleep History Reviewed With Patient   How many hours do you sleep at night? 8   2 nights weekly of working 2pm to midnight throw off his sleep schedule- typically sleeps from 2-9:30am on those days , but on nights he doesn't work goes to bed 11:00pm- 9:00am         MEDICATIONS:   Current Outpatient Medications   Medication Sig Dispense Refill    atorvastatin (LIPITOR) 40 MG tablet TAKE 1 TABLET BY MOUTH DAILY 100 tablet 0    celecoxib (CELEBREX) 200 MG capsule TAKE 1 CAPSULE BY MOUTH DAILY 100 capsule 2    hydrochlorothiazide (HYDRODIURIL) 25 MG tablet Take 1 tablet (25 mg) by mouth daily 90 tablet 3    Multiple Vitamin (MULTI VITAMIN  MENS) TABS       omeprazole 20 MG tablet Take 20 mg by mouth daily             ALLERGIES:   Allergies   Allergen Reactions    Nkda [No Known Drug Allergy]            12/7/2024     6:21 PM   CYNTHIA Score (Last Two)   CYNTHIA Raw Score 29    Activation Score 52.9    CYNTHIA Level 2        Patient-reported         {Diagnostic Test Results (Optional):876782}      Objective    /86 (BP Location: Left arm, Patient Position: Sitting, Cuff Size: Adult Regular)   Pulse 83   Ht 1.753 m (5' 9\")   Wt 105.7 kg (233 lb)   SpO2 97%   BMI 34.41 kg/m    /86 (BP Location: Left arm, Patient Position: Sitting, Cuff Size: Adult Regular)   Pulse 83   Ht 1.753 m (5' 9\")   Wt 105.7 kg (233 lb)   SpO2 97%   BMI 34.41 kg/m    Body mass index is 34.41 kg/m .  Physical Exam   GENERAL: alert and no " distress  EYES: Eyes grossly normal to inspection.  No discharge or erythema, or obvious scleral/conjunctival abnormalities.  RESP: No audible wheeze, cough, or visible cyanosis.    SKIN: Visible skin clear. No significant rash, abnormal pigmentation or lesions.  NEURO: Cranial nerves grossly intact.  Mentation and speech appropriate for age.  PSYCH: Appropriate affect, tone, and pace of words     Anti-obesity medication ROS:    HEENT  Hx of glaucoma: Yes glaucoma suspect, sees eye doctor once a year.     Cardiovascular  CAD:No  HTN:Yes      Gastrointestinal  GERD:Yes  Constipation:No  Liver Dz:No  H/O Pancreatitis:No    Psychiatric  Bipolar: No  Anxiety:No  Depression:No  History of alcohol/drug abuse: No  Hx of eating disorder:No    Endocrine  Personal or family hx of MTC or MEN2:No  Diabetes/prediabetes: Yes prediabetes     Neurologic:  Hx of seizures: No  Hx of migraines: No  Memory Impairment: No  CVA history: No      History of kidney stones: No  Kidney disease: No    Taking Opioid/Narcotic: No        Sincerely,    Mindi Breen PA-C     The longitudinal plan of care for the diagnosis(es)/condition(s) as documented were addressed during this visit. Due to the added complexity in care, I will continue to support Mina in the subsequent management and with ongoing continuity of care.    "Pulse 83   Ht 1.753 m (5' 9\")   Wt 105.7 kg (233 lb)   SpO2 97%   BMI 34.41 kg/m    /86 (BP Location: Left arm, Patient Position: Sitting, Cuff Size: Adult Regular)   Pulse 83   Ht 1.753 m (5' 9\")   Wt 105.7 kg (233 lb)   SpO2 97%   BMI 34.41 kg/m    Body mass index is 34.41 kg/m .  Physical Exam   GENERAL: alert and no distress  EYES: Eyes grossly normal to inspection.  No discharge or erythema, or obvious scleral/conjunctival abnormalities.  RESP: No audible wheeze, cough, or visible cyanosis.    SKIN: Visible skin clear. No significant rash, abnormal pigmentation or lesions.  NEURO: Cranial nerves grossly intact.  Mentation and speech appropriate for age.  PSYCH: Appropriate affect, tone, and pace of words     Anti-obesity medication ROS:    HEENT  Hx of glaucoma: Yes glaucoma suspect, sees eye doctor once a year.     Cardiovascular  CAD:No  HTN:Yes      Gastrointestinal  GERD:Yes  Constipation:No  Liver Dz:No  H/O Pancreatitis:No    Psychiatric  Bipolar: No  Anxiety:No  Depression:No  History of alcohol/drug abuse: No  Hx of eating disorder:No    Endocrine  Personal or family hx of MTC or MEN2:No  Diabetes/prediabetes: Yes prediabetes     Neurologic:  Hx of seizures: No  Hx of migraines: No  Memory Impairment: No  CVA history: No      History of kidney stones: No  Kidney disease: No    Taking Opioid/Narcotic: No        Sincerely,    Mindi Breen PA-C     The longitudinal plan of care for the diagnosis(es)/condition(s) as documented were addressed during this visit. Due to the added complexity in care, I will continue to support Mina in the subsequent management and with ongoing continuity of care.   "

## 2024-12-09 ENCOUNTER — OFFICE VISIT (OUTPATIENT)
Dept: ENDOCRINOLOGY | Facility: CLINIC | Age: 69
End: 2024-12-09
Payer: COMMERCIAL

## 2024-12-09 ENCOUNTER — APPOINTMENT (OUTPATIENT)
Dept: LAB | Facility: CLINIC | Age: 69
End: 2024-12-09
Payer: COMMERCIAL

## 2024-12-09 VITALS
SYSTOLIC BLOOD PRESSURE: 127 MMHG | HEIGHT: 69 IN | BODY MASS INDEX: 34.51 KG/M2 | OXYGEN SATURATION: 97 % | HEART RATE: 83 BPM | DIASTOLIC BLOOD PRESSURE: 86 MMHG | WEIGHT: 233 LBS

## 2024-12-09 DIAGNOSIS — E78.5 HYPERLIPIDEMIA LDL GOAL <160: ICD-10-CM

## 2024-12-09 DIAGNOSIS — E66.811 CLASS 1 OBESITY WITH SERIOUS COMORBIDITY AND BODY MASS INDEX (BMI) OF 34.0 TO 34.9 IN ADULT, UNSPECIFIED OBESITY TYPE: Primary | ICD-10-CM

## 2024-12-09 DIAGNOSIS — R73.03 PREDIABETES: ICD-10-CM

## 2024-12-09 LAB
EST. AVERAGE GLUCOSE BLD GHB EST-MCNC: 137 MG/DL
HBA1C MFR BLD: 6.4 % (ref 0–5.6)

## 2024-12-09 PROCEDURE — 83970 ASSAY OF PARATHORMONE: CPT

## 2024-12-09 PROCEDURE — 99000 SPECIMEN HANDLING OFFICE-LAB: CPT | Performed by: PATHOLOGY

## 2024-12-09 PROCEDURE — 36415 COLL VENOUS BLD VENIPUNCTURE: CPT

## 2024-12-09 PROCEDURE — 80053 COMPREHEN METABOLIC PANEL: CPT

## 2024-12-09 PROCEDURE — 82306 VITAMIN D 25 HYDROXY: CPT

## 2024-12-09 RX ORDER — NALTREXONE HYDROCHLORIDE 50 MG/1
TABLET, FILM COATED ORAL
Qty: 30 TABLET | Refills: 3 | Status: SHIPPED | OUTPATIENT
Start: 2024-12-09

## 2024-12-09 RX ORDER — ATORVASTATIN CALCIUM 40 MG/1
40 TABLET, FILM COATED ORAL DAILY
Qty: 90 TABLET | Refills: 2 | Status: SHIPPED | OUTPATIENT
Start: 2024-12-09

## 2024-12-09 ASSESSMENT — PAIN SCALES - GENERAL: PAINLEVEL_OUTOF10: NO PAIN (0)

## 2024-12-09 NOTE — NURSING NOTE
"(   Chief Complaint   Patient presents with    New Patient     New MWM    )    ( Weight: 105.7 kg (233 lb) )  ( Height: 175.3 cm (5' 9\") )  ( BMI (Calculated): 34.41 )  (   )  ( Cumulative weight loss (lbs): 0 )  (   )  (   )  ( Waist Circumference (cm): 117 cm )  (   )    ( BP: 127/86 )  (   )  (   )  (   )  ( Pulse: 83 )  (   )  ( SpO2: 97 % )    (   Patient Active Problem List   Diagnosis    DJD (degenerative joint disease) of knee    HYPERLIPIDEMIA LDL GOAL <160    Class 1 obesity due to excess calories with serious comorbidity and body mass index (BMI) of 33.0 to 33.9 in adult    Hypertension goal BP (blood pressure) < 140/90    Elevated serum glucose    Prediabetes    )  (   Current Outpatient Medications   Medication Sig Dispense Refill    atorvastatin (LIPITOR) 40 MG tablet TAKE 1 TABLET BY MOUTH DAILY 100 tablet 0    celecoxib (CELEBREX) 200 MG capsule TAKE 1 CAPSULE BY MOUTH DAILY 100 capsule 2    hydrochlorothiazide (HYDRODIURIL) 25 MG tablet Take 1 tablet (25 mg) by mouth daily 90 tablet 3    Multiple Vitamin (MULTI VITAMIN  MENS) TABS       omeprazole 20 MG tablet Take 20 mg by mouth daily      Semaglutide-Weight Management (WEGOVY) 1 MG/0.5ML pen Inject 1 mg subcutaneously once a week For weeks 9 and beyond 2 mL 0    )  ( Diabetes Eval:    )    ( Pain Eval:  No Pain (0) )    ( Wound Eval:       )    (   History   Smoking Status    Never   Smokeless Tobacco    Never    )    ( Signed By:  Cruz Patten, EMT; December 9, 2024; 9:52 AM )    "

## 2024-12-09 NOTE — LETTER
"2024       RE: Mina Ames  [address]  [facility] [address]     Dear Colleague,    Thank you for referring your patient, Mina Ames, to the [facility]NAG[facility]. Please see a copy of my visit note below.          48 minutes spent by me on the date of the encounter doing chart review, history and exam, documentation and further activities per the note    New Medical Weight Management Consult    PATIENT:  Mina Ames  MRN:         [id number]  :         1955  RIK:         2024    Dear PCP,    I had the pleasure of seeing your patient, Mina Ames. Full intake/assessment was done to determine barriers to weight loss success and develop a treatment plan. Mina Ames is a 69 year old male interested in treatment of medical problems associated with excess weight. He has a height of 5' 9\", a weight of 233 lbs 0 oz, and the calculated Body mass index is 34.41 kg/m .            Assessment & Plan  Problem List Items Addressed This Visit       HYPERLIPIDEMIA LDL GOAL <160    Relevant Medications    naltrexone (DEPADE/REVIA) 50 MG tablet    Other Relevant Orders    Comprehensive metabolic panel (Completed)    Vitamin D Deficiency (Completed)    Parathyroid Hormone Intact (Completed)    Hemoglobin A1c (Completed)    Prediabetes    Relevant Medications    naltrexone (DEPADE/REVIA) 50 MG tablet    Other Relevant Orders    Comprehensive metabolic panel (Completed)    Vitamin D Deficiency (Completed)    Parathyroid Hormone Intact (Completed)    Hemoglobin A1c (Completed)     Other Visit Diagnoses       Class 1 obesity with serious comorbidity and body mass index (BMI) of 34.0 to 34.9 in adult, unspecified obesity type    -  Primary    Relevant Medications    naltrexone (DEPADE/REVIA) 50 MG tablet    Other Relevant Orders    Comprehensive metabolic panel (Completed)    Vitamin D Deficiency " (Completed)    Parathyroid Hormone Intact (Completed)    Hemoglobin A1c (Completed)             Plan  Start Naltrexone 50mg - take 1/2 tablet daily for 7 days, then increase to 1 tablet daily. If lab levels are consistent with type 2 diabetes, will start ozempic (last fasting glucose 140, last A1c 6.2)  Please talk to your ophthalmologist about topiramate given concerns about glaucoma   Goals we discussed today:   90g protein daily   Explore zero sugar water flavoring- goal of minimum 48oz water daily    Labs ordered today: A1c, pth, vitamin d, cmp   Follow up with Mindi in 3 months   Dietician appointment not covered due to medicare status, Mina will consider canceling this          Anti-obesity medication started today for this patient   NALTREXONE  No history of liver disease  No chronic pain   No current opioid use   .     Potential anti-obesity medications for this patient the future if there are issues with cost or side effects  OZEMPIC  No history of pancreatitis   No personal or family history of medullary thyroid carcinoma  No personal or family history of Multiple Endocrine Neoplasia Type 2  .    The following medications could be considered with caution for this patient   TOPIRAMATE  No history of kidney stones  No history of glaucoma   No issues with memory  Caution would be needed with this medication due to age, has been told he is a glaucoma suspect- would want clearance from optho. recently elevated creatinine. Would want to confirm stable gfr prior to starting   .  BUPROPION  Did not discuss, could consider in the future   No history of bipolar disorder  No history of cardiovascular disease   No history of cardiac arrhythmias   No history of seizures  No history of bulimia nervosa  No history of anorexia nervosa  Caution would be needed with this medication due to age, HTN currently within goal   .  METFORMIN  Did not discuss, could consider in the future   Has diagnosis of prediabetes   No  "history of chronic kidney disease  GFR >45  .     Contraindicated/Failed Weight loss medications for this patient   PHENTERMINE  Contraindicated due to age   .          Mina Ames is a 69 year old male who presents to clinic today for the following health issues.     He has the following co-morbidities:        12/7/2024     6:40 PM   --   I have the following health issues associated with obesity Pre-Diabetes    High Blood Pressure    High Cholesterol    GERD (Reflux)   I have the following symptoms associated with obesity Back Pain            No data to display                    12/7/2024     6:40 PM   Referring Provider   Please name the provider who referred you to Medical Weight Management  If you do not know, please answer \"I Don't Know\" Madai Stevens     Overweight onset as child. In college was able to lose from 198 to 170lbs with increasing physical activity, cutting out sweets. Weight regain with moving to [place] in his 30s, dating his wife. This weight gain has since been overall gradual. Was able to lose 10lbs age 63 with using an exercise bike, but has since seen weight regain despite continuing to exercise.     Highest weight in life is current weight of 233lbs.      Comorbidities associated with weight gain include hyperlipidemia, HTN, knee pain, back pain, pre-diabetes, GERD (managed with daily omeprazole).     Motivators for weight loss include feel healthier, improve self esteem.     He is possibly interested in starting a medication as a tool for working towards sustainable weight loss.    Regarding eating patterns and diet, he typically eats 2 meals a day. Doesn't generally get cravings. Is able to get full. Can stay full until next meal. Does struggle with portion control. Does not experience food noise. Does not experience emotional eating. Does not experience a loss of control around eating.    Eats out/ gets take out 3 times a week. Drinks pop a lot of the day- diet mountain dew, 2 " cans a day. Milk at lunch, 1%. Milk before bed. Doesn't drink water often due to the lack of flavor. No ETOH. Juice rarely.     Quick meal recall:   B: typically skips, might have Bengali toast and eggs on the weekends   L: healthy choice frozen meal or hot dogs and bun + chips (doritos)   D: If working will have granola bar ([place] peanut butter + apple + diet mountain dew during work, then will have cottage cheese and grapes when he gets home. If not working, will get dominos pizza, noodles and company, taco bell. Neither cooks very often.     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 Outside of work, spends time at home. Rides his exercise bike 50 min 5x weekly. On days he's not working, will walk 60-70 minutes outside. No weight training- is concerned about doing this due to his back pain. Has also considered joining a gym.       Past AOMs   Wegovy- prescribed by primary care, not covered by medicare, never got to start it           12/7/2024     6:40 PM   Weight History   How concerned are you about your weight? Somewhat Concerned   I became overweight As an Adult   The following factors have contributed to my weight gain Change in Schedule    Eating Wrong Types of Food    Eating Too Much   I have tried the following methods to lose weight Exercise   My lowest weight since age 19 was 170   My highest weight since age 19 was 228   The most weight I have ever lost was (lbs) 28   I have the following family history of obesity/being overweight I am the only one in my immediate family who is overweight   How has your weight changed over the last year? Gained   How many pounds? 5           12/7/2024     6:40 PM   Diet Recall Review with Patient   If you do eat lunch, what types of food do you typically eat? [place] with potato salad or healthy choice with a salad   If you do eat supper, what types of food do you typically eat?  Pizza , burgers, pasta   How many glasses of juice do you drink in a typical day? 0   How many of glasses of milk do you drink in a typical day? 1   If you do drink milk, what type? 1%   How many 8oz glasses of sugar containing drinks such as Christ-Aid/sweet tea do you drink in a day? 0   How many cans/bottles of sugar pop/soda/tea/sports drinks do you drink in a day? 0   How many cans/bottles of diet pop/soda/tea or sports drink do you drink in a day? 2   How often do you have a drink of alcohol? Never           12/7/2024     6:40 PM   Eating Habits   Generally, my meals include foods like these bread, pasta, rice, potatoes, corn, crackers, sweet dessert, pop, or juice A Few Times a Week   Generally, my meals include foods like these fried meats, brats, burgers, french fries, pizza, cheese, chips, or ice cream A Few Times a Week   Eat fast food (like [employer], Burger Theo, mSilica Bell) A Few Times a Week   Eat at a buffet or sit-down restaurant Less Than Weekly   Eat most of my meals in front of the TV or computer A Few Times a Week   Often skip meals, eat at random times, have no regular eating times Never   Rarely sit down for a meal but snack or graze throughout Never   Eat extra snacks between meals Never   Eat most of my food at the end of the day Less Than Weekly   Eat in the middle of the night or wake up at night to eat Once a Week   Eat extra snacks to prevent or correct low blood sugar Never   Eat to prevent acid reflux or stomach pain Never   Worry about not having enough food to eat Never   I eat when I am depressed Never   I eat when I am stressed Never   I eat when I am bored Never   I eat when I am anxious Never   I eat when I am happy or as a reward Never   I feel hungry all the time even if I just have eaten Never   Feeling full is important to me Never   I finish all the food on my plate even if I am already full Never   I can't resist eating delicious food or walk past the good food/smell Once a  Week   I eat/snack without noticing that I am eating Never   I eat when I am preparing the meal Never   I eat more than usual when I see others eating Never   I have trouble not eating sweets, ice cream, cookies, or chips if they are around the house Never   I think about food all day Never   What foods, if any, do you crave? Cheese           12/7/2024     6:40 PM   Amount of Food   I feel out of control when eating Never   I eat a large amount of food, like a loaf of bread, a box of cookies, a pint/quart of ice cream, all at once Never   I eat a large amount of food even when I am not hungry Never   I eat rapidly Never   I eat alone because I feel embarrassed and do not want others to see how much I have eaten Never   I eat until I am uncomfortably full Never   I feel bad, disgusted, or guilty after I overeat Never           12/7/2024     6:40 PM   Activity/Exercise History   How much of a typical 12 hour day do you spend sitting? Most of the Day   How much of a typical 12 hour day do you spend lying down? Less Than Half the Day   How much of a typical day do you spend walking/standing? Less Than Half the Day   How many hours (not including work) do you spend on the TV/Video Games/Computer/Tablet/Phone? 2-3 Hours   How many times a week are you active for the purpose of exercise? 4-5 TImes a Week   What keeps you from being more active? Lack of Time   How many total minutes do you spend doing some activity for the purpose of exercising when you exercise? More Than 30 Minutes       PAST MEDICAL HISTORY:  Past Medical History:   Diagnosis Date     Adult BMI 30+      Class 1 obesity due to excess calories with serious comorbidity and body mass index (BMI) of 33.0 to 33.9 in adult 07/22/2022     DJD (degenerative joint disease) of knee     xray Oct., 2009     Hyperlipidemia      Hyperlipidemia LDL goal <160 10/31/2010     Hypertension goal BP (blood pressure) < 140/90 07/21/2023     Squamous cell skin cancer 2007     chest           12/7/2024     6:40 PM   Work/Social History Reviewed With Patient   My employment status is Part-Time   My job is    How much of your job is spent on the computer or phone? Less Than 50%   How many hours do you spend commuting to work daily? 1   What is your marital status? /In a Relationship   If in a relationship, is your significant other overweight? Yes   Who do you live with? My wife   Who does the food shopping? I do       Marijuana use- none   Alcohol use - none   Caffeine use - 2 cans diet mountain dew daily              12/7/2024     6:40 PM   Mental Health History Reviewed With Patient   Have you ever been physically or sexually abused? No   How often in the past 2 weeks have you felt little interest or pleasure in doing things? Not at all   Over the past 2 weeks how often have you felt down, depressed, or hopeless? Not at all             12/7/2024     6:40 PM   Sleep History Reviewed With Patient   How many hours do you sleep at night? 8   2 nights weekly of working 2pm to midnight throw off his sleep schedule- typically sleeps from 2-9:30am on those days , but on nights he doesn't work goes to bed 11:00pm- 9:00am         MEDICATIONS:   Current Outpatient Medications   Medication Sig Dispense Refill     celecoxib (CELEBREX) 200 MG capsule TAKE 1 CAPSULE BY MOUTH DAILY 100 capsule 2     hydrochlorothiazide (HYDRODIURIL) 25 MG tablet Take 1 tablet (25 mg) by mouth daily 90 tablet 3     Multiple Vitamin (MULTI VITAMIN  MENS) TABS        naltrexone (DEPADE/REVIA) 50 MG tablet Take 1/2 tablet once daily 1-2 hours prior to worst cravings for 1 week, then increase to 1 tablet daily as directed if tolerating 30 tablet 3     omeprazole 20 MG tablet Take 20 mg by mouth daily       atorvastatin (LIPITOR) 40 MG tablet TAKE 1 TABLET BY MOUTH DAILY 90 tablet 2     semaglutide (OZEMPIC) 2 MG/3ML pen Inject 0.25mg weekly x 4 weeks then increase to 0.5mg weekly thereafter 3 mL 2  "          ALLERGIES:   Allergies   Allergen Reactions     Nkda [No Known Drug Allergy]            12/7/2024     6:21 PM   CYNTHIA Score (Last Two)   CYNTHIA Raw Score 29    Activation Score 52.9    CYNTHIA Level 2        Patient-reported               Objective   /86 (BP Location: Left arm, Patient Position: Sitting, Cuff Size: Adult Regular)   Pulse 83   Ht 1.753 m (5' 9\")   Wt 105.7 kg (233 lb)   SpO2 97%   BMI 34.41 kg/m    /86 (BP Location: Left arm, Patient Position: Sitting, Cuff Size: Adult Regular)   Pulse 83   Ht 1.753 m (5' 9\")   Wt 105.7 kg (233 lb)   SpO2 97%   BMI 34.41 kg/m    Body mass index is 34.41 kg/m .  Physical Exam   GENERAL: alert and no distress  EYES: Eyes grossly normal to inspection.  No discharge or erythema, or obvious scleral/conjunctival abnormalities.  RESP: No audible wheeze, cough, or visible cyanosis.    SKIN: Visible skin clear. No significant rash, abnormal pigmentation or lesions.  NEURO: Cranial nerves grossly intact.  Mentation and speech appropriate for age.  PSYCH: Appropriate affect, tone, and pace of words     Anti-obesity medication ROS:    HEENT  Hx of glaucoma: Yes glaucoma suspect, sees eye doctor once a year.     Cardiovascular  CAD:No  HTN:Yes      Gastrointestinal  GERD:Yes  Constipation:No  Liver Dz:No  H/O Pancreatitis:No    Psychiatric  Bipolar: No  Anxiety:No  Depression:No  History of alcohol/drug abuse: No  Hx of eating disorder:No    Endocrine  Personal or family hx of MTC or MEN2:No  Diabetes/prediabetes: Yes prediabetes     Neurologic:  Hx of seizures: No  Hx of migraines: No  Memory Impairment: No  CVA history: No      History of kidney stones: No  Kidney disease: No    Taking Opioid/Narcotic: No        Sincerely,    Mindi Breen PA-C     The longitudinal plan of care for the diagnosis(es)/condition(s) as documented were addressed during this visit. Due to the added complexity in care, I will continue to support Mina in the subsequent " management and with ongoing continuity of care.       Again, thank you for allowing me to participate in the care of your patient.      Sincerely,    Mindi Breen PA-C

## 2024-12-09 NOTE — PATIENT INSTRUCTIONS
"Thank you for allowing us the privilege of caring for you. We hope we provided you with the excellent service you deserve.   Please let us know if there is anything else we can do for you so that we can be sure you are completely satisfied with your care experience.    To ensure the quality of our services you may be receiving a patient satisfaction survey from an independent patient satisfaction monitoring company.    The greatest compliment you can give is a \"Likely to Recommend\"    Your visit was with Mindi Breen PA-C today.    Instructions per today's visit:     Aniceto Ames, it was great to visit with you today.  Here is a review of our visit.  If our clinic scheduler is not able to reach you please call [phone] to schedule your next appointments.    Plan  Start Naltrexone 50mg - take 1/2 tablet daily for 7 days, then increase to 1 tablet daily. If lab levels are consistent with type 2 diabetes, will start ozempic (last fasting glucose 140, last A1c 6.2)  Please talk to your ophthalmologist about topiramate given concerns about glaucoma   Goals we discussed today:   90g protein daily   Explore zero sugar water flavoring- goal of minimum 48oz water daily    Labs ordered today: A1c, pth, vitamin d, cmp   Follow up with Mindi in 3 months   Dietician appointment not covered due to medicare status, Mina will consider canceling this            Information about Video Visits with [facility]: video visit information  _________________________________________________________________________________________________________________________________________________________  If you are asked by your clinic team to have your blood pressure checked:  [facility] do offer several locations for blood pressure checks. Please follow the below link to schedule an appointment. Scheduling an appointment at the pharmacy for a blood pressure check is now preferred.    Appointment Plus " ([email or web address])  _________________________________________________________________________________________________________________________________________________________  Important contact and scheduling information:  Please call our contact center at [phone] to schedule your next appointments.  To find a lab location near you, please call [phone].  For any nursing questions or concerns call Cathi Garcia LPN at [phone] or Kianna Muñoz RN at [phone]  Please call during clinic hours Monday through Friday 8:00a - 4:00p if you have questions or you can contact us via [email or web address] at anytime and we will reply during clinic hours.    Lab results will be communicated through My Chart or letter (if My Chart not used). Please call the clinic if you have not received communication after 1 week or if you have any questions.?  Clinic Fax: [phone]    _________________________________________________________________________________________________________________________________________________________  Interested in working with a health ?  Health coaches work with you to improve your overall health and wellbeing.  They look at the whole person, and may involve discussion of different areas of life, including, but not limited to the four pillars of health (sleep, exercise, nutrition, and stress management). Discuss with your care team if you would like to start working a health .  Health Coaching-3 Pack: Schedule by calling [phone]    $99 for three health coaching visits    Visits may be done in person or via phone    Coaching is a partnership between the  and the client; Coaches do not prescribe or diagnose    Coaching helps inspire the client to reach his/her personal goals   _________________________________________________________________________________________________________________________________________________________  24 Week Healthy Lifestyle Plan:    Our mission  in the 24-week Healthy Lifestyle Plan is to provide you with individualized care by giving you the tools, education and support you need to lose weight and maintain a healthy lifestyle. In your 24-week journey, you ll be supported by a dedicated weight loss team that includes registered dietitians, medical weight management providers, health coaches, and nurses -- all with special expertise in weight loss -- to help you every step of the way.     Monthly meetings with your registered dietician or medical weight management provider help to review your progress, update your care plan, and make any adjustments needed to ensure success. Between these visits, weekly and bi-weekly health  visits will help you focus on the four pillars of weight loss -- stress, sleep, nutrition, and exercise -- and how you can best adapt each to achieve sustainable weight loss results.    In addition, you will be given exclusive access to online wellbeing classes through [employer].  Your initial visit will be with a medical weight management provider who will help to understand your weight loss goals and ensure this program is the right fit for you. Please let our team know if you are interested in the 24 week plan by sending a message to your care team or calling [phone] to schedule.  _________________________________________________________________________________________________________________________________________________________  __________  [facility] Get Moving Program  Our team of physical therapists is trained to help you understand and take control of your condition. They will perform a thorough evaluation to determine your ability for activity and develop a customized plan to fit your goals and physical ability.  Scheduling: Unsure if the Get Moving program is right for you? Discuss the program with your medical provider or diabetes educator. You can also call us at [phone] to ask questions  or schedule an appointment.   [facility] Get Moving Program  ____________________________________________________________________________________________________________________________________________________________________________  [facility] Diabetes Prevention Program (DPP)  If you have prediabetes and Medicare please contact us via [employer] to learn more about the Diabetes Prevention Program (DPP)  Program Details:  [facility] offers the year-long Diabetes Prevention Program (DPP). The program helps you to make lifestyle changes that prevent or delay type 2 diabetes by supporting healthy eating, increased physical activity, stress reduction and use of coping skills.   On average, previous [facility] DPP cohorts have lost and maintained at least 5% of their starting weight throughout the program and averaged more than 150 minutes of physical activity per week.  Participants meet weekly for one-hour group sessions over sixteen weeks, every other week for the next 8 weeks, and monthly for the last six months.   A year-long maintenance program is also available for participants who complete the first year.   Location & Cost:   During the COVID-19 Public Health Emergency, the program is offered virtually. When in-person classes can resume, they will be held at [facility].  For people with Medicare, the program is covered in full. A self-pay option will also be available for those with non-Medicare insurance plans.   ______________________________________________________________________________________________________________________________________________________________________________________________________________________________    To work with a Behavioral Health Psychologist:    Call to schedule:    Todd Harris - [phone]  Keisha Molina - [phone]  Mamta Malcolm - [phone]  Madai Marin - [phone]   Daylin Adame PhD (cannot accept  Medicare) [phone]        Thank you,   [facility] Comprehensive Weight Management Team

## 2024-12-10 LAB
ALBUMIN SERPL BCG-MCNC: 4.4 G/DL (ref 3.5–5.2)
ALP SERPL-CCNC: 122 U/L (ref 40–150)
ALT SERPL W P-5'-P-CCNC: 20 U/L (ref 0–70)
ANION GAP SERPL CALCULATED.3IONS-SCNC: 12 MMOL/L (ref 7–15)
AST SERPL W P-5'-P-CCNC: 26 U/L (ref 0–45)
BILIRUB SERPL-MCNC: 0.7 MG/DL
BUN SERPL-MCNC: 22.4 MG/DL (ref 8–23)
CALCIUM SERPL-MCNC: 9.6 MG/DL (ref 8.8–10.4)
CHLORIDE SERPL-SCNC: 103 MMOL/L (ref 98–107)
CREAT SERPL-MCNC: 1.29 MG/DL (ref 0.67–1.17)
EGFRCR SERPLBLD CKD-EPI 2021: 60 ML/MIN/1.73M2
GLUCOSE SERPL-MCNC: 131 MG/DL (ref 70–99)
HCO3 SERPL-SCNC: 25 MMOL/L (ref 22–29)
POTASSIUM SERPL-SCNC: 4.1 MMOL/L (ref 3.4–5.3)
PROT SERPL-MCNC: 7.1 G/DL (ref 6.4–8.3)
PTH-INTACT SERPL-MCNC: 37 PG/ML (ref 15–65)
SODIUM SERPL-SCNC: 140 MMOL/L (ref 135–145)
VIT D+METAB SERPL-MCNC: 31 NG/ML (ref 20–50)

## 2024-12-11 DIAGNOSIS — E11.9 TYPE 2 DIABETES MELLITUS WITHOUT COMPLICATION, WITHOUT LONG-TERM CURRENT USE OF INSULIN (H): Primary | ICD-10-CM

## 2024-12-20 PROBLEM — E11.65 TYPE 2 DIABETES MELLITUS WITH HYPERGLYCEMIA, WITHOUT LONG-TERM CURRENT USE OF INSULIN (H): Status: ACTIVE | Noted: 2024-12-11

## 2024-12-20 PROBLEM — R73.03 PREDIABETES: Status: RESOLVED | Noted: 2024-08-01 | Resolved: 2024-12-20

## 2025-01-12 ENCOUNTER — TRANSFERRED RECORDS (OUTPATIENT)
Dept: MULTI SPECIALTY CLINIC | Facility: CLINIC | Age: 70
End: 2025-01-12

## 2025-01-12 LAB — RETINOPATHY: NORMAL

## 2025-03-27 ENCOUNTER — OFFICE VISIT (OUTPATIENT)
Dept: ENDOCRINOLOGY | Facility: CLINIC | Age: 70
End: 2025-03-27
Payer: COMMERCIAL

## 2025-03-27 ENCOUNTER — LAB (OUTPATIENT)
Dept: LAB | Facility: CLINIC | Age: 70
End: 2025-03-27
Payer: COMMERCIAL

## 2025-03-27 VITALS
HEIGHT: 69 IN | SYSTOLIC BLOOD PRESSURE: 146 MMHG | OXYGEN SATURATION: 97 % | BODY MASS INDEX: 33.71 KG/M2 | DIASTOLIC BLOOD PRESSURE: 90 MMHG | HEART RATE: 80 BPM | WEIGHT: 227.6 LBS

## 2025-03-27 DIAGNOSIS — E11.65 TYPE 2 DIABETES MELLITUS WITH HYPERGLYCEMIA, WITHOUT LONG-TERM CURRENT USE OF INSULIN (H): Primary | ICD-10-CM

## 2025-03-27 DIAGNOSIS — E66.811 CLASS 1 OBESITY WITH SERIOUS COMORBIDITY AND BODY MASS INDEX (BMI) OF 34.0 TO 34.9 IN ADULT, UNSPECIFIED OBESITY TYPE: ICD-10-CM

## 2025-03-27 LAB
ANION GAP SERPL CALCULATED.3IONS-SCNC: 12 MMOL/L (ref 7–15)
BUN SERPL-MCNC: 21.9 MG/DL (ref 8–23)
CALCIUM SERPL-MCNC: 9.9 MG/DL (ref 8.8–10.4)
CHLORIDE SERPL-SCNC: 101 MMOL/L (ref 98–107)
CREAT SERPL-MCNC: 1.34 MG/DL (ref 0.67–1.17)
CREAT UR-MCNC: 215 MG/DL
EGFRCR SERPLBLD CKD-EPI 2021: 58 ML/MIN/1.73M2
EST. AVERAGE GLUCOSE BLD GHB EST-MCNC: 137 MG/DL
GLUCOSE SERPL-MCNC: 124 MG/DL (ref 70–99)
HBA1C MFR BLD: 6.4 % (ref 0–5.6)
HCO3 SERPL-SCNC: 27 MMOL/L (ref 22–29)
MICROALBUMIN UR-MCNC: <12 MG/L
MICROALBUMIN/CREAT UR: NORMAL MG/G{CREAT}
POTASSIUM SERPL-SCNC: 4.4 MMOL/L (ref 3.4–5.3)
SODIUM SERPL-SCNC: 140 MMOL/L (ref 135–145)

## 2025-03-27 PROCEDURE — 99214 OFFICE O/P EST MOD 30 MIN: CPT

## 2025-03-27 PROCEDURE — 83036 HEMOGLOBIN GLYCOSYLATED A1C: CPT

## 2025-03-27 PROCEDURE — 82043 UR ALBUMIN QUANTITATIVE: CPT

## 2025-03-27 PROCEDURE — 80048 BASIC METABOLIC PNL TOTAL CA: CPT

## 2025-03-27 PROCEDURE — 36415 COLL VENOUS BLD VENIPUNCTURE: CPT

## 2025-03-27 PROCEDURE — 1126F AMNT PAIN NOTED NONE PRSNT: CPT

## 2025-03-27 PROCEDURE — 82570 ASSAY OF URINE CREATININE: CPT

## 2025-03-27 PROCEDURE — 3080F DIAST BP >= 90 MM HG: CPT

## 2025-03-27 PROCEDURE — 3077F SYST BP >= 140 MM HG: CPT

## 2025-03-27 RX ORDER — METFORMIN HYDROCHLORIDE 500 MG/1
TABLET, EXTENDED RELEASE ORAL
Qty: 60 TABLET | Refills: 3 | Status: SHIPPED | OUTPATIENT
Start: 2025-03-27

## 2025-03-27 ASSESSMENT — PAIN SCALES - GENERAL: PAINLEVEL_OUTOF10: NO PAIN (0)

## 2025-03-27 NOTE — NURSING NOTE
"(   Chief Complaint   Patient presents with    RECHECK     Return MW, 3 month follow up    )    ( Weight: 103.2 kg (227 lb 9.6 oz) )  ( Height: 175.3 cm (5' 9\") )  ( BMI (Calculated): 33.61 )  (   )  (   )  (   )  (   )  ( Waist Circumference (cm): 115 cm )  (   )    ( BP: (!) 146/90 )  (   )  (   )  (   )  ( Pulse: 80 )  (   )  ( SpO2: 97 % )    (   Patient Active Problem List   Diagnosis    DJD (degenerative joint disease) of knee    HYPERLIPIDEMIA LDL GOAL <160    Class 1 obesity due to excess calories with serious comorbidity and body mass index (BMI) of 33.0 to 33.9 in adult    Hypertension goal BP (blood pressure) < 140/90    Elevated serum glucose    Type 2 diabetes mellitus with hyperglycemia, without long-term current use of insulin (H)    )  (   Current Outpatient Medications   Medication Sig Dispense Refill    atorvastatin (LIPITOR) 40 MG tablet TAKE 1 TABLET BY MOUTH DAILY 90 tablet 2    celecoxib (CELEBREX) 200 MG capsule TAKE 1 CAPSULE BY MOUTH DAILY 100 capsule 2    hydrochlorothiazide (HYDRODIURIL) 25 MG tablet Take 1 tablet (25 mg) by mouth daily 90 tablet 3    Multiple Vitamin (MULTI VITAMIN  MENS) TABS       omeprazole 20 MG tablet Take 20 mg by mouth daily      naltrexone (DEPADE/REVIA) 50 MG tablet Take 1/2 tablet once daily 1-2 hours prior to worst cravings for 1 week, then increase to 1 tablet daily as directed if tolerating 30 tablet 3    semaglutide (OZEMPIC) 2 MG/3ML pen Inject 0.25mg weekly x 4 weeks then increase to 0.5mg weekly thereafter 3 mL 2    )  ( Diabetes Eval:    )    ( Pain Eval:  No Pain (0) )    ( Wound Eval:       )    (   History   Smoking Status    Never   Smokeless Tobacco    Never    )    ( Signed By:  Cruz Patten, EMT; March 27, 2025; 12:28 PM )    "

## 2025-03-27 NOTE — PROGRESS NOTES
Return Medical Weight Management Note     Mina Ames  MRN:  [id number]  :  1955  RIK:  3/27/2025    Dear Madai Stevens NP,    I had the pleasure of seeing your patient Mina Ames. He is a 69 year old male who I am continuing to see for treatment of obesity related to:        2024     6:40 PM   --   I have the following health issues associated with obesity Pre-Diabetes    High Blood Pressure    High Cholesterol    GERD (Reflux)   I have the following symptoms associated with obesity Back Pain       Assessment & Plan   Problem List Items Addressed This Visit       Type 2 diabetes mellitus with hyperglycemia, without long-term current use of insulin (H) - Primary    Relevant Medications    metFORMIN (GLUCOPHAGE XR) 500 MG 24 hr tablet    Other Relevant Orders    Hemoglobin A1c    Basic metabolic panel     Other Visit Diagnoses       Class 1 obesity with serious comorbidity and body mass index (BMI) of 34.0 to 34.9 in adult, unspecified obesity type        Relevant Medications    metFORMIN (GLUCOPHAGE XR) 500 MG 24 hr tablet               INTERVAL HISTORY:  New MWM with Mindi Breen PA-C on 2024.   Labs showed two fasting glucose >125 indicating Type II diabetes. Ozempic started       Anti-obesity medication history    Current:   Ozempic - did not start, did not think it was covered by insurance.   However, is not interested in it due to wife had side effects.     Naltrexone - did not start it     Past/Failed/contraindicated:   ***    Recent diet changes: Eating 2 meals a day, no snacks. No increase in hunger or thoughts of food. Can get full and stay full. Feels like the hardest part is food choices. Eats dinner at work - works from 2pm to midnight. Drinks 16oz of water. Drinking 2 cans of diet pop. Drinking 2 glasses of skim milk.   Breakfast - skips as does not get up till around 10.   Lunch - sandwich, salad, frozen meals   Dinner - cheese and crackers,   Snack - at 1 am left  overs     Recent exercise/activity changes: elliptical machine - 4-5xweek for 40-50min. Walks daily.     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     Recent sleep changes: can be off due to swing shift hours     Vitamins/Labs: ***    CURRENT WEIGHT:   227 lbs 9.6 oz    Initial Weight (lbs): 233 lbs  Last Visits Weight: 105.7 kg (233 lb)  Cumulative weight loss (lbs): 5.4  Weight Loss Percentage: 2.32%  Waist Circumference (cm): 115 cm    Wt Readings from Last 5 Encounters:   03/27/25 103.2 kg (227 lb 9.6 oz)   12/09/24 105.7 kg (233 lb)   08/01/24 103.7 kg (228 lb 9.6 oz)   07/21/23 104.1 kg (229 lb 9.6 oz)   03/29/23 104.7 kg (230 lb 12.8 oz)             3/22/2025     3:42 PM   Changes and Difficulties   I have made the following changes to my diet since my last visit: I am drinking more water   With regards to my diet, I am still struggling with: Eating less junk food   I have made the following changes to my activity/exercise since my last visit: I have increased the amount of time on my exercise machine   With regards to my activity/exercise, I am still struggling with: Getting to a gym to do strength training         MEDICATIONS:   Current Outpatient Medications   Medication Sig Dispense Refill    atorvastatin (LIPITOR) 40 MG tablet TAKE 1 TABLET BY MOUTH DAILY 90 tablet 2    celecoxib (CELEBREX) 200 MG capsule TAKE 1 CAPSULE BY MOUTH DAILY 100 capsule 2    hydrochlorothiazide (HYDRODIURIL) 25 MG tablet Take 1 tablet (25 mg) by mouth daily 90 tablet 3    metFORMIN (GLUCOPHAGE XR) 500 MG 24 hr tablet 1 tablet by mouth daily with meal for 1 week, then 2 tablets daily with meal. 60 tablet 3    Multiple Vitamin (MULTI VITAMIN  MENS) TABS       omeprazole 20 MG tablet Take 20 mg by mouth daily      naltrexone (DEPADE/REVIA) 50 MG tablet Take 1/2 tablet once daily 1-2 hours prior to worst cravings for 1 week, then increase to 1 tablet daily as directed if tolerating 30 tablet 3    semaglutide  "(OZEMPIC) 2 MG/3ML pen Inject 0.25mg weekly x 4 weeks then increase to 0.5mg weekly thereafter 3 mL 2           3/22/2025     3:42 PM   Weight Loss Medication History Reviewed With Patient   Which weight loss medications are you currently taking on a regular basis? None   If you are not taking a weight loss medication that was prescribed to you, please indicate why: The cost is too much for me    My insurance does not cover the cost   Are you having any side effects from the weight loss medication that we have prescribed you? No       {Diagnostic Test Results (Optional):500235}    Anti-obesity medication ROS:    HEENT  Hx of glaucoma: No, but being monitored     Cardiovascular  CAD:No  HTN:Yes    Gastrointestinal  GERD:Yes, well controlled on medications  Constipation:No  Liver Dz:No  H/O Pancreatitis:No    Psychiatric  Bipolar: No  Anxiety:No  Depression:No  History of alcohol/drug abuse: No  Hx of eating disorder:No    Endocrine  Personal or family hx of MTC or MEN2:No  Diabetes/prediabetes: Yes    Neurologic:  Hx of seizures: No  Hx of migraines: No  Memory Impairment: No      History of kidney stones: No  Kidney disease: No  Current birth control:  N/A    Taking Opioid/Narcotic: No        Objective    BP (!) 146/90 (BP Location: Left arm, Patient Position: Sitting, Cuff Size: Adult Regular)   Pulse 80   Ht 1.753 m (5' 9\")   Wt 103.2 kg (227 lb 9.6 oz)   SpO2 97%   BMI 33.61 kg/m      PHYSICAL EXAM:  GENERAL: alert and no distress  EYES: Eyes grossly normal to inspection.  No discharge or erythema, or obvious scleral/conjunctival abnormalities.  RESP: No audible wheeze, cough, or visible cyanosis.    SKIN: Visible skin clear. No significant rash, abnormal pigmentation or lesions.  NEURO: Cranial nerves grossly intact.  Mentation and speech appropriate for age.  PSYCH: Appropriate affect, tone, and pace of words        Sincerely,    Heather Williamson PA-C      *** minutes spent by me on the date of the " encounter doing chart review, history and exam, documentation and further activities per the note    ***   "9\")   Wt 103.2 kg (227 lb 9.6 oz)   SpO2 97%   BMI 33.61 kg/m      PHYSICAL EXAM:  GENERAL: alert and no distress  EYES: Eyes grossly normal to inspection.  No discharge or erythema, or obvious scleral/conjunctival abnormalities.  RESP: No audible wheeze, cough, or visible cyanosis.    SKIN: Visible skin clear. No significant rash, abnormal pigmentation or lesions.  NEURO: Cranial nerves grossly intact.  Mentation and speech appropriate for age.  PSYCH: Appropriate affect, tone, and pace of words        Sincerely,    Heather Williamson PA-C      30 minutes spent by me on the date of the encounter doing chart review, history and exam, documentation and further activities per the note      "

## 2025-03-27 NOTE — LETTER
3/27/2025       RE: Mina Ames  [address]  [facility] [address]     Dear Colleague,    Thank you for referring your patient, Mina Ames, to the [facility] [place] [facility]. Please see a copy of my visit note below.      Return Medical Weight Management Note     Mina Ames  MRN:  [id number]  :  1955  RIK:  3/27/2025    Dear Madai Stevens NP,    I had the pleasure of seeing your patient Mina Ames. He is a 69 year old male who I am continuing to see for treatment of obesity related to:        2024     6:40 PM   --   I have the following health issues associated with obesity Pre-Diabetes    High Blood Pressure    High Cholesterol    GERD (Reflux)   I have the following symptoms associated with obesity Back Pain       Assessment & Plan  Problem List Items Addressed This Visit       Class 1 obesity with serious comorbidity and body mass index (BMI) of 34.0 to 34.9 in adult, unspecified obesity type     Last seen by Mindi Potts PA-C 2024 for [facility]. Diagnosed with Type II Diabetes - two fasting glucose >125. Was prescribed Ozempic, however patient states it was not covered by insurance. Discussed starting Ozempic today to help with Type II diabetes and weight loss. But he is not interested in starting a GLP-1 as wife had side effects.     Naltrexone was also prescribed last visit, and was not started. He is not interested in this medications. Discussed other AOM options. Phentermine contraindicated due to age and HTN. Topiramate - patient concern for brain fog.     Metformin will be started today. No contraindications. Discussed side effects, risks, and benefits. No hx of kidney or liver disease. History of Type II diabetes. GFR 60.            Relevant Medications    metFORMIN (GLUCOPHAGE XR) 500 MG 24 hr tablet    Type 2 diabetes mellitus with hyperglycemia, without long-term  current use of insulin (H)    Relevant Medications    metFORMIN (GLUCOPHAGE XR) 500 MG 24 hr tablet    Other Relevant Orders    Hemoglobin A1c (Completed)    Basic metabolic panel (Completed)      Start Metformin 500mg - take 1 tablet with a meal once daily  BMP today for baseline  A1C for updated diabetes labs   Mindi Breen PA-C in 4 months. Will reach out via [employer] in 1 month.         INTERVAL HISTORY:  New MWM with Mindi Breen PA-C on 12/9/2024.   Labs showed two fasting glucose >125 indicating Type II diabetes. Ozempic prescribed.       Anti-obesity medication history    Current:   Ozempic - did not start, did not think it was covered by insurance.   However, is not interested in it due to wife had side effects.     Naltrexone - did not start it       Recent diet changes: Eating 2 meals a day, no snacks. No increase in hunger or thoughts of food. Can get full and stay full. Feels like the hardest part is food choices. Eats dinner at work - works from 2pm to midnight. Drinks 16oz of water. Drinking 2 cans of diet pop. Drinking 2 glasses of skim milk.   Breakfast - skips as does not get up till around 10.   Lunch - sandwich, salad, frozen meals   Dinner - cheese and crackers,   Snack - at 1 am left overs     Recent exercise/activity changes: elliptical machine - 4-5xweek for 40-50min. Walks daily.     Recent stressors: works as a  for airport 2xweek.     Recent sleep changes: can be off due to swing shift hours       CURRENT WEIGHT:   227 lbs 9.6 oz    Initial Weight (lbs): 233 lbs  Last Visits Weight: 105.7 kg (233 lb)  Cumulative weight loss (lbs): 5.4  Weight Loss Percentage: 2.32%  Waist Circumference (cm): 115 cm    Wt Readings from Last 5 Encounters:   03/27/25 103.2 kg (227 lb 9.6 oz)   12/09/24 105.7 kg (233 lb)   08/01/24 103.7 kg (228 lb 9.6 oz)   07/21/23 104.1 kg (229 lb 9.6 oz)   03/29/23 104.7 kg (230 lb 12.8 oz)             3/22/2025     3:42 PM   Changes and Difficulties   I  have made the following changes to my diet since my last visit: I am drinking more water   With regards to my diet, I am still struggling with: Eating less junk food   I have made the following changes to my activity/exercise since my last visit: I have increased the amount of time on my exercise machine   With regards to my activity/exercise, I am still struggling with: Getting to a gym to do strength training         MEDICATIONS:   Current Outpatient Medications   Medication Sig Dispense Refill     atorvastatin (LIPITOR) 40 MG tablet TAKE 1 TABLET BY MOUTH DAILY 90 tablet 2     celecoxib (CELEBREX) 200 MG capsule TAKE 1 CAPSULE BY MOUTH DAILY 100 capsule 2     hydrochlorothiazide (HYDRODIURIL) 25 MG tablet Take 1 tablet (25 mg) by mouth daily 90 tablet 3     metFORMIN (GLUCOPHAGE XR) 500 MG 24 hr tablet Take 1 tablet (500 mg) by mouth daily (with dinner). 1 tablet by mouth daily with meal for 1 week, then 2 tablets daily with meal. 30 tablet 3     Multiple Vitamin (MULTI VITAMIN  MENS) TABS        omeprazole 20 MG tablet Take 20 mg by mouth daily       naltrexone (DEPADE/REVIA) 50 MG tablet Take 1/2 tablet once daily 1-2 hours prior to worst cravings for 1 week, then increase to 1 tablet daily as directed if tolerating 30 tablet 3     semaglutide (OZEMPIC) 2 MG/3ML pen Inject 0.25mg weekly x 4 weeks then increase to 0.5mg weekly thereafter 3 mL 2           3/22/2025     3:42 PM   Weight Loss Medication History Reviewed With Patient   Which weight loss medications are you currently taking on a regular basis? None   If you are not taking a weight loss medication that was prescribed to you, please indicate why: The cost is too much for me    My insurance does not cover the cost   Are you having any side effects from the weight loss medication that we have prescribed you? No       Office Visit on 12/09/2024   Component Date Value Ref Range Status     Sodium 12/09/2024 140  135 - 145 mmol/L Final     Potassium  12/09/2024 4.1  3.4 - 5.3 mmol/L Final     Carbon Dioxide (CO2) 12/09/2024 25  22 - 29 mmol/L Final     Anion Gap 12/09/2024 12  7 - 15 mmol/L Final     Urea Nitrogen 12/09/2024 22.4  8.0 - 23.0 mg/dL Final     Creatinine 12/09/2024 1.29 (H)  0.67 - 1.17 mg/dL Final     GFR Estimate 12/09/2024 60 (L)  >60 mL/min/1.73m2 Final    eGFR calculated using 2021 CKD-EPI equation.     Calcium 12/09/2024 9.6  8.8 - 10.4 mg/dL Final    Reference intervals for this test were updated on 7/16/2024 to reflect our healthy population more accurately. There may be differences in the flagging of prior results with similar values performed with this method. Those prior results can be interpreted in the context of the updated reference intervals.     Chloride 12/09/2024 103  98 - 107 mmol/L Final     Glucose 12/09/2024 131 (H)  70 - 99 mg/dL Final     Alkaline Phosphatase 12/09/2024 122  40 - 150 U/L Final     AST 12/09/2024 26  0 - 45 U/L Final     ALT 12/09/2024 20  0 - 70 U/L Final     Protein Total 12/09/2024 7.1  6.4 - 8.3 g/dL Final     Albumin 12/09/2024 4.4  3.5 - 5.2 g/dL Final     Bilirubin Total 12/09/2024 0.7  <=1.2 mg/dL Final     Vitamin D, Total (25-Hydroxy) 12/09/2024 31  20 - 50 ng/mL Final    optimum levels     Parathyroid Hormone Intact 12/09/2024 37  15 - 65 pg/mL Final     Estimated Average Glucose 12/09/2024 137 (H)  <117 mg/dL Final     Hemoglobin A1C 12/09/2024 6.4 (H)  0.0 - 5.6 % Final    Normal <5.7%   Prediabetes 5.7-6.4%    Diabetes 6.5% or higher     Note: Adopted from ADA consensus guidelines.       Anti-obesity medication ROS:    HEENT  Hx of glaucoma: No, but being monitored     Cardiovascular  CAD:No  HTN:Yes    Gastrointestinal  GERD:Yes, well controlled on medications  Constipation:No  Liver Dz:No  H/O Pancreatitis:No    Psychiatric  Bipolar: No  Anxiety:No  Depression:No  History of alcohol/drug abuse: No  Hx of eating disorder:No    Endocrine  Personal or family hx of MTC or  "MEN2:No  Diabetes/prediabetes: Yes    Neurologic:  Hx of seizures: No  Hx of migraines: No  Memory Impairment: No      History of kidney stones: No  Kidney disease: No  Current birth control:  N/A    Taking Opioid/Narcotic: No        Objective   BP (!) 146/90 (BP Location: Left arm, Patient Position: Sitting, Cuff Size: Adult Regular)   Pulse 80   Ht 1.753 m (5' 9\")   Wt 103.2 kg (227 lb 9.6 oz)   SpO2 97%   BMI 33.61 kg/m      PHYSICAL EXAM:  GENERAL: alert and no distress  EYES: Eyes grossly normal to inspection.  No discharge or erythema, or obvious scleral/conjunctival abnormalities.  RESP: No audible wheeze, cough, or visible cyanosis.    SKIN: Visible skin clear. No significant rash, abnormal pigmentation or lesions.  NEURO: Cranial nerves grossly intact.  Mentation and speech appropriate for age.  PSYCH: Appropriate affect, tone, and pace of words        Sincerely,    Heather Williamson PA-C      30 minutes spent by me on the date of the encounter doing chart review, history and exam, documentation and further activities per the note        Again, thank you for allowing me to participate in the care of your patient.      Sincerely,    Heather Williamson PA-C    "

## 2025-03-31 ENCOUNTER — MYC MEDICAL ADVICE (OUTPATIENT)
Dept: ENDOCRINOLOGY | Facility: CLINIC | Age: 70
End: 2025-03-31

## 2025-03-31 RX ORDER — METFORMIN HYDROCHLORIDE 500 MG/1
500 TABLET, EXTENDED RELEASE ORAL
Qty: 30 TABLET | Refills: 3 | Status: SHIPPED | OUTPATIENT
Start: 2025-03-31

## 2025-04-12 PROBLEM — E66.811 CLASS 1 OBESITY WITH SERIOUS COMORBIDITY AND BODY MASS INDEX (BMI) OF 34.0 TO 34.9 IN ADULT, UNSPECIFIED OBESITY TYPE: Status: ACTIVE | Noted: 2022-07-22

## 2025-04-13 NOTE — ASSESSMENT & PLAN NOTE
Last seen by Mindi Potts PA-C 12/2024 for New MWM. Diagnosed with Type II Diabetes - two fasting glucose >125. Was prescribed Ozempic, however patient states it was not covered by insurance. Discussed starting Ozempic today to help with Type II diabetes and weight loss. But he is not interested in starting a GLP-1 as wife had side effects.     Naltrexone was also prescribed last visit, and was not started. He is not interested in this medications. Discussed other AOM options. Phentermine contraindicated due to age and HTN. Topiramate - patient concern for brain fog.     Metformin will be started today. No contraindications. Discussed side effects, risks, and benefits. No hx of kidney or liver disease. History of Type II diabetes. GFR 60.

## 2025-04-13 NOTE — PATIENT INSTRUCTIONS
Visit Plan:     Start Metformin 500mg - take 1 tablet with a meal once daily  BMP today for baseline  A1C for updated diabetes labs   Mindi Breen PA-C in 4 months. Will reach out via [employer] in 1 month.       METFORMIN    We are considering starting metformin. Starting instructions:    Immediate release tablet: Take 1 tablet by mouth daily with a meal for 1 week, then increase to 1 tablet twice daily with meals.   Extended release tablet: Take 1 tablet by mouth daily with a meal for 1 week, then increase to 2 tablets daily with a meal or 1 tablet twice daily with meals.      Metformin is a medication that is used to assist with lowering blood sugars in patients that have Pre-Diabetes or Diabetes. It has also been found to help with weight loss.    Metformin helps to lower blood sugars by lowering the amount of sugar (glucose) your liver produces that would otherwise cause your blood sugar to increase. It also makes your body respond better to insulin (the product that lowers your blood sugar). It is unclear how metformin assists with weight loss.     Side-effects. Metformin is generally well tolerated. The main side-effects we see are: Diarrhea, nausea and stomach upset - this tends to subside over time as your body gets  used to the medication. Taking this medications with food will lower these side effects.    Low blood sugar (hypoglycemia) is rare to occur with metformin, but can occur if you do not eat enough or are taking other medications that assist to lower blood sugar. The signs of low blood sugar are:  Weakness  Shaky   Hungry  Sweating  Confusion      See below for ways to treat low blood sugar without adding in lots of extra calories.    Treating Low Blood Sugar    If you have symptoms of low blood sugar (sweating, shaking, dizzy, confused) eat 15 grams of carbs and wait 15 minutes:    Glucose Tabs are best for sugars under 70 -  Dex4 or BD Glucose tablets are good, you will need to take 3-4 of these  to equal 15 grams.     One small box of raisins  4 oz fruit juice box or   cup fruit juice  1 small apple  1 small banana    cup canned fruit in water    English muffin or a slice of bread with jelly   1 low fat frozen waffle with sugar-free syrup    cup cottage cheese with   cup frozen or fresh blueberries  1 cup skim or low-fat milk    cup whole grain cereal  4-6 crackers such as Triscuits      Please refer to the pharmacy insert for more information on side-effects. Please call the clinic should you have more questions regarding this medication.     For any questions or concerns please send a [employer] App message to our team or call our weight management call center at [phone] during regular business hours. For questions during evenings or weekends your messages will be addressed during the next business day.  For emergencies please call 911 or seek immediate medical care.      In order to get refills of this or any medication we prescribe you must be seen in the medical weight mgmt clinic every 2-3 months. Please have your pharmacy fax a refill request to [phone].

## 2025-04-19 DIAGNOSIS — I10 HYPERTENSION GOAL BP (BLOOD PRESSURE) < 140/90: ICD-10-CM

## 2025-04-20 RX ORDER — HYDROCHLOROTHIAZIDE 25 MG/1
25 TABLET ORAL DAILY
Qty: 100 TABLET | Refills: 0 | Status: SHIPPED | OUTPATIENT
Start: 2025-04-20

## 2025-05-02 DIAGNOSIS — M54.50 CHRONIC LOW BACK PAIN WITHOUT SCIATICA, UNSPECIFIED BACK PAIN LATERALITY: ICD-10-CM

## 2025-05-02 DIAGNOSIS — G89.29 CHRONIC LOW BACK PAIN WITHOUT SCIATICA, UNSPECIFIED BACK PAIN LATERALITY: ICD-10-CM

## 2025-05-03 ENCOUNTER — HEALTH MAINTENANCE LETTER (OUTPATIENT)
Age: 70
End: 2025-05-03

## 2025-05-05 RX ORDER — CELECOXIB 200 MG/1
CAPSULE ORAL
Qty: 100 CAPSULE | Refills: 0 | Status: SHIPPED | OUTPATIENT
Start: 2025-05-05

## 2025-05-14 ENCOUNTER — IMMUNIZATION (OUTPATIENT)
Dept: FAMILY MEDICINE | Facility: CLINIC | Age: 70
End: 2025-05-14
Payer: COMMERCIAL

## 2025-05-14 DIAGNOSIS — Z23 ENCOUNTER FOR IMMUNIZATION: Primary | ICD-10-CM

## 2025-05-14 PROCEDURE — 90480 ADMN SARSCOV2 VAC 1/ONLY CMP: CPT

## 2025-05-14 PROCEDURE — 99207 PR NO CHARGE NURSE ONLY: CPT

## 2025-05-14 PROCEDURE — 91320 SARSCV2 VAC 30MCG TRS-SUC IM: CPT

## 2025-05-14 NOTE — PROGRESS NOTES
Prior to immunization administration, verified patients identity using patient s name and date of birth. Please see Immunization Activity for additional information.     Is the patient's temperature normal (100.5 or less)? Yes     Patient MEETS CRITERIA. PROCEED with vaccine administration.      Patient instructed to remain in clinic for 15 minutes afterwards, and to report any adverse reactions.      Link to Ancillary Visit Immunization Standing Orders SmartSet     Screening performed by Chastity Smith MA on 5/14/2025 at 11:40 AM.        Prior to immunization administration, verified patients identity using patient s name and date of birth. Please see Immunization Activity for additional information.     Screening Questionnaire for Adult Immunization    Are you sick today?   No   Do you have allergies to medications, food, a vaccine component or latex?   No   Have you ever had a serious reaction after receiving a vaccination?   No   Do you have a long-term health problem with heart, lung, kidney, or metabolic disease (e.g., diabetes), asthma, a blood disorder, no spleen, complement component deficiency, a cochlear implant, or a spinal fluid leak?  Are you on long-term aspirin therapy?   No   Do you have cancer, leukemia, HIV/AIDS, or any other immune system problem?   No   Do you have a parent, brother, or sister with an immune system problem?   No   In the past 3 months, have you taken medications that affect  your immune system, such as prednisone, other steroids, or anticancer drugs; drugs for the treatment of rheumatoid arthritis, Crohn s disease, or psoriasis; or have you had radiation treatments?   No   Have you had a seizure, or a brain or other nervous system problem?   No   During the past year, have you received a transfusion of blood or blood    products, or been given immune (gamma) globulin or antiviral drug?   No   For women: Are you pregnant or is there a chance you could become       pregnant  during the next month?   No   Have you received any vaccinations in the past 4 weeks?   No     Immunization questionnaire answers were all negative.    I have reviewed the following standing orders:   This patient is due and qualifies for the Covid-19 vaccine.     Click here for COVID-19 Standing Order    I have reviewed the vaccines inclusion and exclusion criteria; No concerns regarding eligibility.     Patient instructed to remain in clinic for 15 minutes afterwards, and to report any adverse reactions.     Screening performed by Chastity Smith MA on 5/14/2025 at 11:40 AM.

## 2025-05-29 ENCOUNTER — TELEPHONE (OUTPATIENT)
Dept: ENDOCRINOLOGY | Facility: CLINIC | Age: 70
End: 2025-05-29
Payer: COMMERCIAL

## 2025-05-29 NOTE — TELEPHONE ENCOUNTER
Left Voicemail (1st Attempt) and Sent Mychart (1st Attempt) for the patient to call back and schedule the following:    Appointment type: Return Weight Management  Appointment mode: In Person or Virtual Visit  Provider: Mindi Breen PA-C  Return date: Approx. Next available  Specialty phone number: [phone]    Additional Notes:   Reschedule 8/11/25 visit with Jamshid

## 2025-05-30 ENCOUNTER — TELEPHONE (OUTPATIENT)
Dept: ENDOCRINOLOGY | Facility: CLINIC | Age: 70
End: 2025-05-30
Payer: COMMERCIAL

## 2025-05-30 DIAGNOSIS — E11.65 TYPE 2 DIABETES MELLITUS WITH HYPERGLYCEMIA, WITHOUT LONG-TERM CURRENT USE OF INSULIN (H): ICD-10-CM

## 2025-06-03 RX ORDER — METFORMIN HYDROCHLORIDE 500 MG/1
500 TABLET, EXTENDED RELEASE ORAL
Qty: 30 TABLET | Refills: 3 | Status: SHIPPED | OUTPATIENT
Start: 2025-06-03

## 2025-06-25 ENCOUNTER — TELEPHONE (OUTPATIENT)
Dept: FAMILY MEDICINE | Facility: CLINIC | Age: 70
End: 2025-06-25

## 2025-06-28 DIAGNOSIS — I10 HYPERTENSION GOAL BP (BLOOD PRESSURE) < 140/90: ICD-10-CM

## 2025-07-01 RX ORDER — HYDROCHLOROTHIAZIDE 25 MG/1
25 TABLET ORAL DAILY
Qty: 100 TABLET | Refills: 0 | Status: SHIPPED | OUTPATIENT
Start: 2025-07-01

## 2025-07-18 ENCOUNTER — TELEPHONE (OUTPATIENT)
Dept: FAMILY MEDICINE | Facility: CLINIC | Age: 70
End: 2025-07-18
Payer: COMMERCIAL

## 2025-07-18 NOTE — TELEPHONE ENCOUNTER
Patient Quality Outreach    Patient is due for the following:   Physical Annual Wellness Visit    Action(s) Taken:   Schedule a Annual Wellness Visit    Type of outreach:    Sent [employer] message.    Questions for provider review:    None         Asuncion Rawls MA  Chart routed to None.

## 2025-07-29 ENCOUNTER — PATIENT OUTREACH (OUTPATIENT)
Dept: CARE COORDINATION | Facility: CLINIC | Age: 70
End: 2025-07-29
Payer: COMMERCIAL

## 2025-08-09 DIAGNOSIS — M54.50 CHRONIC LOW BACK PAIN WITHOUT SCIATICA, UNSPECIFIED BACK PAIN LATERALITY: ICD-10-CM

## 2025-08-09 DIAGNOSIS — G89.29 CHRONIC LOW BACK PAIN WITHOUT SCIATICA, UNSPECIFIED BACK PAIN LATERALITY: ICD-10-CM

## 2025-08-10 RX ORDER — CELECOXIB 200 MG/1
200 CAPSULE ORAL DAILY
Qty: 100 CAPSULE | Refills: 0 | Status: SHIPPED | OUTPATIENT
Start: 2025-08-10

## 2025-08-16 SDOH — HEALTH STABILITY: PHYSICAL HEALTH: ON AVERAGE, HOW MANY DAYS PER WEEK DO YOU ENGAGE IN MODERATE TO STRENUOUS EXERCISE (LIKE A BRISK WALK)?: 5 DAYS

## 2025-08-16 SDOH — HEALTH STABILITY: PHYSICAL HEALTH: ON AVERAGE, HOW MANY MINUTES DO YOU ENGAGE IN EXERCISE AT THIS LEVEL?: 40 MIN

## 2025-08-16 ASSESSMENT — SOCIAL DETERMINANTS OF HEALTH (SDOH): HOW OFTEN DO YOU GET TOGETHER WITH FRIENDS OR RELATIVES?: ONCE A WEEK

## 2025-08-19 DIAGNOSIS — E11.65 TYPE 2 DIABETES MELLITUS WITH HYPERGLYCEMIA, WITHOUT LONG-TERM CURRENT USE OF INSULIN (H): ICD-10-CM

## 2025-08-21 ENCOUNTER — OFFICE VISIT (OUTPATIENT)
Dept: FAMILY MEDICINE | Facility: CLINIC | Age: 70
End: 2025-08-21
Attending: NURSE PRACTITIONER
Payer: COMMERCIAL

## 2025-08-21 ENCOUNTER — TELEPHONE (OUTPATIENT)
Dept: FAMILY MEDICINE | Facility: CLINIC | Age: 70
End: 2025-08-21

## 2025-08-21 DIAGNOSIS — I10 HYPERTENSION GOAL BP (BLOOD PRESSURE) < 140/90: ICD-10-CM

## 2025-08-21 DIAGNOSIS — E11.65 TYPE 2 DIABETES MELLITUS WITH HYPERGLYCEMIA, WITHOUT LONG-TERM CURRENT USE OF INSULIN (H): ICD-10-CM

## 2025-08-21 DIAGNOSIS — N18.31 STAGE 3A CHRONIC KIDNEY DISEASE (H): ICD-10-CM

## 2025-08-21 DIAGNOSIS — Z00.00 ENCOUNTER FOR MEDICARE ANNUAL WELLNESS EXAM: Primary | ICD-10-CM

## 2025-08-21 DIAGNOSIS — Z12.5 ENCOUNTER FOR SCREENING FOR MALIGNANT NEOPLASM OF PROSTATE: ICD-10-CM

## 2025-08-21 DIAGNOSIS — E66.811 CLASS 1 OBESITY DUE TO EXCESS CALORIES WITH SERIOUS COMORBIDITY AND BODY MASS INDEX (BMI) OF 33.0 TO 33.9 IN ADULT: ICD-10-CM

## 2025-08-21 DIAGNOSIS — E66.09 CLASS 1 OBESITY DUE TO EXCESS CALORIES WITH SERIOUS COMORBIDITY AND BODY MASS INDEX (BMI) OF 33.0 TO 33.9 IN ADULT: ICD-10-CM

## 2025-08-21 DIAGNOSIS — E78.5 HYPERLIPIDEMIA LDL GOAL <160: ICD-10-CM

## 2025-08-21 LAB
ALBUMIN SERPL BCG-MCNC: 4.3 G/DL (ref 3.5–5.2)
ALP SERPL-CCNC: 143 U/L (ref 40–150)
ALT SERPL W P-5'-P-CCNC: 25 U/L (ref 0–70)
ANION GAP SERPL CALCULATED.3IONS-SCNC: 12 MMOL/L (ref 7–15)
AST SERPL W P-5'-P-CCNC: 32 U/L (ref 0–45)
BILIRUB SERPL-MCNC: 0.6 MG/DL
BUN SERPL-MCNC: 26.5 MG/DL (ref 8–23)
CALCIUM SERPL-MCNC: 9.9 MG/DL (ref 8.8–10.4)
CHLORIDE SERPL-SCNC: 99 MMOL/L (ref 98–107)
CHOLEST SERPL-MCNC: 160 MG/DL
CREAT SERPL-MCNC: 1.32 MG/DL (ref 0.67–1.17)
EGFRCR SERPLBLD CKD-EPI 2021: 58 ML/MIN/1.73M2
ERYTHROCYTE [DISTWIDTH] IN BLOOD BY AUTOMATED COUNT: 12.6 % (ref 10–15)
EST. AVERAGE GLUCOSE BLD GHB EST-MCNC: 137 MG/DL
FASTING STATUS PATIENT QL REPORTED: YES
FASTING STATUS PATIENT QL REPORTED: YES
GLUCOSE SERPL-MCNC: 126 MG/DL (ref 70–99)
HBA1C MFR BLD: 6.4 % (ref 0–5.6)
HCO3 SERPL-SCNC: 27 MMOL/L (ref 22–29)
HCT VFR BLD AUTO: 45.6 % (ref 40–53)
HDLC SERPL-MCNC: 49 MG/DL
HGB BLD-MCNC: 15.4 G/DL (ref 13.3–17.7)
LDLC SERPL CALC-MCNC: 83 MG/DL
MCH RBC QN AUTO: 30 PG (ref 26.5–33)
MCHC RBC AUTO-ENTMCNC: 33.8 G/DL (ref 31.5–36.5)
MCV RBC AUTO: 88.7 FL (ref 78–100)
NONHDLC SERPL-MCNC: 111 MG/DL
PLATELET # BLD AUTO: 257 10E3/UL (ref 150–450)
POTASSIUM SERPL-SCNC: 4.2 MMOL/L (ref 3.4–5.3)
PROT SERPL-MCNC: 7.3 G/DL (ref 6.4–8.3)
PSA SERPL DL<=0.01 NG/ML-MCNC: 0.86 NG/ML (ref 0–4.5)
RBC # BLD AUTO: 5.14 10E6/UL (ref 4.4–5.9)
SODIUM SERPL-SCNC: 138 MMOL/L (ref 135–145)
TRIGL SERPL-MCNC: 141 MG/DL
WBC # BLD AUTO: 8.8 10E3/UL (ref 4–11)

## 2025-08-21 PROCEDURE — 80061 LIPID PANEL: CPT | Performed by: NURSE PRACTITIONER

## 2025-08-21 PROCEDURE — 83036 HEMOGLOBIN GLYCOSYLATED A1C: CPT | Performed by: NURSE PRACTITIONER

## 2025-08-21 PROCEDURE — 80053 COMPREHEN METABOLIC PANEL: CPT | Performed by: NURSE PRACTITIONER

## 2025-08-21 RX ORDER — ATORVASTATIN CALCIUM 40 MG/1
40 TABLET, FILM COATED ORAL DAILY
Qty: 90 TABLET | Refills: 3 | Status: SHIPPED | OUTPATIENT
Start: 2025-08-21

## 2025-08-21 RX ORDER — LISINOPRIL AND HYDROCHLOROTHIAZIDE 10; 12.5 MG/1; MG/1
1 TABLET ORAL DAILY
Qty: 90 TABLET | Refills: 3 | Status: SHIPPED | OUTPATIENT
Start: 2025-08-21

## 2025-08-21 RX ORDER — METFORMIN HYDROCHLORIDE 500 MG/1
500 TABLET, EXTENDED RELEASE ORAL
Qty: 30 TABLET | Refills: 3 | Status: SHIPPED | OUTPATIENT
Start: 2025-08-21

## 2025-08-24 PROBLEM — R73.9 ELEVATED SERUM GLUCOSE: Status: RESOLVED | Noted: 2024-08-01 | Resolved: 2025-08-24

## 2025-08-24 PROBLEM — N18.31 STAGE 3A CHRONIC KIDNEY DISEASE (H): Status: ACTIVE | Noted: 2025-08-24

## 2025-08-26 VITALS
OXYGEN SATURATION: 97 % | HEART RATE: 70 BPM | BODY MASS INDEX: 33.59 KG/M2 | RESPIRATION RATE: 16 BRPM | TEMPERATURE: 97.5 F | HEIGHT: 69 IN | SYSTOLIC BLOOD PRESSURE: 118 MMHG | DIASTOLIC BLOOD PRESSURE: 92 MMHG | WEIGHT: 226.8 LBS